# Patient Record
Sex: FEMALE | Race: WHITE | NOT HISPANIC OR LATINO | Employment: UNEMPLOYED | URBAN - METROPOLITAN AREA
[De-identification: names, ages, dates, MRNs, and addresses within clinical notes are randomized per-mention and may not be internally consistent; named-entity substitution may affect disease eponyms.]

---

## 2018-11-18 ENCOUNTER — OFFICE VISIT (OUTPATIENT)
Dept: URGENT CARE | Facility: MEDICAL CENTER | Age: 23
End: 2018-11-18
Payer: COMMERCIAL

## 2018-11-18 VITALS
DIASTOLIC BLOOD PRESSURE: 78 MMHG | WEIGHT: 10.28 LBS | BODY MASS INDEX: 1.94 KG/M2 | OXYGEN SATURATION: 100 % | HEART RATE: 90 BPM | RESPIRATION RATE: 18 BRPM | TEMPERATURE: 98.2 F | HEIGHT: 61 IN | SYSTOLIC BLOOD PRESSURE: 110 MMHG

## 2018-11-18 DIAGNOSIS — R30.0 DYSURIA: Primary | ICD-10-CM

## 2018-11-18 LAB
SL AMB  POCT GLUCOSE, UA: ABNORMAL
SL AMB LEUKOCYTE ESTERASE,UA: ABNORMAL
SL AMB POCT BILIRUBIN,UA: ABNORMAL
SL AMB POCT BLOOD,UA: ABNORMAL
SL AMB POCT CLARITY,UA: ABNORMAL
SL AMB POCT COLOR,UA: ABNORMAL
SL AMB POCT KETONES,UA: ABNORMAL
SL AMB POCT NITRITE,UA: ABNORMAL
SL AMB POCT PH,UA: 6
SL AMB POCT SPECIFIC GRAVITY,UA: 1
SL AMB POCT URINE PROTEIN: ABNORMAL
SL AMB POCT UROBILINOGEN: 0.2

## 2018-11-18 PROCEDURE — 99213 OFFICE O/P EST LOW 20 MIN: CPT | Performed by: PHYSICIAN ASSISTANT

## 2018-11-18 PROCEDURE — 87086 URINE CULTURE/COLONY COUNT: CPT | Performed by: PHYSICIAN ASSISTANT

## 2018-11-18 RX ORDER — NORGESTIMATE AND ETHINYL ESTRADIOL 7DAYSX3 LO
1 KIT ORAL DAILY
COMMUNITY
End: 2020-08-22 | Stop reason: ALTCHOICE

## 2018-11-18 RX ORDER — SULFAMETHOXAZOLE AND TRIMETHOPRIM 800; 160 MG/1; MG/1
1 TABLET ORAL EVERY 12 HOURS SCHEDULED
Qty: 14 TABLET | Refills: 0 | Status: SHIPPED | OUTPATIENT
Start: 2018-11-18 | End: 2018-11-25

## 2018-11-18 NOTE — PATIENT INSTRUCTIONS
1  Increase fluids  2  Take Bactrim 1 tablet twice daily x 7 days  3   Follow up with PCP in 3-5 days if symptoms persist

## 2018-11-18 NOTE — PROGRESS NOTES
[facility]        NAME: Geoff Dietz is a 26 y o  female  : 1995    MRN: [id number]  DATE: 2018  TIME: 4:41 PM    Assessment and Plan   Dysuria [R30 0]  1  Dysuria  POCT urine dip    Urine culture    sulfamethoxazole-trimethoprim (BACTRIM DS) 800-160 mg per tablet         Patient Instructions     1  Increase fluids  2  Take Bactrim 1 tablet twice daily x 7 days  3  Follow up with PCP in 3-5 days if symptoms persist      Chief Complaint     Chief Complaint   Patient presents with    Possible UTI         History of Present Illness       The patient is a 67-year-old female presents with a 1 day history of acute onset pain and burning with urination  She denies any abdominal or back pain but has had visible blood in her urine  Review of Systems   Review of Systems   Constitutional: Negative  Gastrointestinal: Negative  Genitourinary: Positive for dysuria, frequency and hematuria  Current Medications       Current Outpatient Prescriptions:     norgestimate-ethinyl estradiol (ORTHO TRI-CYCLEN LO) 0 18/0 215/0 25 MG-25 MCG per tablet, Take 1 tablet by mouth daily, Disp: , Rfl:     sulfamethoxazole-trimethoprim (BACTRIM DS) 800-160 mg per tablet, Take 1 tablet by mouth every 12 (twelve) hours for 7 days, Disp: 14 tablet, Rfl: 0    Current Allergies     Allergies as of 2018 - Reviewed 2018   Allergen Reaction Noted    Azithromycin Rash 2018            The following portions of the patient's history were reviewed and updated as appropriate: allergies, current medications, past family history, past medical history, past social history, past surgical history and problem list      Past Medical History:   Diagnosis Date    Known health problems: none        No past surgical history on file  No family history on file  Medications have been verified          Objective   /78   Pulse 90   Temp 98 2 °F (36 8 °C) (Temporal)   Resp 18   Ht 5' 1" (1 549 m)   Wt 4 663 kg (10 lb 4 5 oz)   SpO2 100%   BMI 1 94 kg/m²        Physical Exam     Physical Exam   Constitutional: She appears well-developed and well-nourished  No distress  Cardiovascular: Normal rate, regular rhythm and normal heart sounds  No murmur heard  Pulmonary/Chest: Effort normal and breath sounds normal    Abdominal: Soft  Bowel sounds are normal  There is tenderness in the suprapubic area  There is no rigidity, no rebound, no guarding and no CVA tenderness

## 2018-11-19 LAB — BACTERIA UR CULT: NORMAL

## 2019-01-13 ENCOUNTER — HOSPITAL ENCOUNTER (EMERGENCY)
Facility: HOSPITAL | Age: 24
Discharge: HOME | End: 2019-01-14
Attending: EMERGENCY MEDICINE | Admitting: EMERGENCY MEDICINE
Payer: COMMERCIAL

## 2019-01-13 ENCOUNTER — APPOINTMENT (EMERGENCY)
Dept: RADIOLOGY | Facility: HOSPITAL | Age: 24
End: 2019-01-13
Payer: COMMERCIAL

## 2019-01-13 VITALS
HEIGHT: 61 IN | TEMPERATURE: 97.9 F | HEART RATE: 73 BPM | OXYGEN SATURATION: 100 % | RESPIRATION RATE: 18 BRPM | SYSTOLIC BLOOD PRESSURE: 113 MMHG | BODY MASS INDEX: 18.88 KG/M2 | DIASTOLIC BLOOD PRESSURE: 72 MMHG | WEIGHT: 100 LBS

## 2019-01-13 DIAGNOSIS — E87.6 HYPOKALEMIA: ICD-10-CM

## 2019-01-13 DIAGNOSIS — R07.9 CHEST PAIN: Primary | ICD-10-CM

## 2019-01-13 DIAGNOSIS — F41.9 ANXIETY: ICD-10-CM

## 2019-01-13 LAB
ANION GAP SERPL CALCULATED.3IONS-SCNC: 14 MMOL/L (ref 4–13)
ATRIAL RATE: 82 BPM
BACTERIA UR QL AUTO: ABNORMAL /HPF
BASOPHILS # BLD AUTO: 0.02 THOUSANDS/ΜL (ref 0–0.1)
BASOPHILS NFR BLD AUTO: 0 % (ref 0–1)
BILIRUB UR QL STRIP: NEGATIVE
BUN SERPL-MCNC: 14 MG/DL (ref 5–25)
CALCIUM SERPL-MCNC: 8.9 MG/DL (ref 8.3–10.1)
CHLORIDE SERPL-SCNC: 101 MMOL/L (ref 100–108)
CLARITY UR: CLEAR
CO2 SERPL-SCNC: 24 MMOL/L (ref 21–32)
COLOR UR: YELLOW
CREAT SERPL-MCNC: 0.87 MG/DL (ref 0.6–1.3)
DEPRECATED D DIMER PPP: 345 NG/ML (FEU)
EOSINOPHIL # BLD AUTO: 0.04 THOUSAND/ΜL (ref 0–0.61)
EOSINOPHIL NFR BLD AUTO: 0 % (ref 0–6)
ERYTHROCYTE [DISTWIDTH] IN BLOOD BY AUTOMATED COUNT: 12.6 % (ref 11.6–15.1)
GFR SERPL CREATININE-BSD FRML MDRD: 95 ML/MIN/1.73SQ M
GLUCOSE SERPL-MCNC: 160 MG/DL (ref 65–140)
GLUCOSE UR STRIP-MCNC: NEGATIVE MG/DL
HCT VFR BLD AUTO: 42.3 % (ref 34.8–46.1)
HGB BLD-MCNC: 14.5 G/DL (ref 11.5–15.4)
HGB UR QL STRIP.AUTO: ABNORMAL
IMM GRANULOCYTES # BLD AUTO: 0.02 THOUSAND/UL (ref 0–0.2)
IMM GRANULOCYTES NFR BLD AUTO: 0 % (ref 0–2)
KETONES UR STRIP-MCNC: ABNORMAL MG/DL
LEUKOCYTE ESTERASE UR QL STRIP: NEGATIVE
LYMPHOCYTES # BLD AUTO: 3.07 THOUSANDS/ΜL (ref 0.6–4.47)
LYMPHOCYTES NFR BLD AUTO: 34 % (ref 14–44)
MCH RBC QN AUTO: 32.7 PG (ref 26.8–34.3)
MCHC RBC AUTO-ENTMCNC: 34.3 G/DL (ref 31.4–37.4)
MCV RBC AUTO: 96 FL (ref 82–98)
MONOCYTES # BLD AUTO: 0.44 THOUSAND/ΜL (ref 0.17–1.22)
MONOCYTES NFR BLD AUTO: 5 % (ref 4–12)
NEUTROPHILS # BLD AUTO: 5.38 THOUSANDS/ΜL (ref 1.85–7.62)
NEUTS SEG NFR BLD AUTO: 61 % (ref 43–75)
NITRITE UR QL STRIP: NEGATIVE
NON-SQ EPI CELLS URNS QL MICRO: ABNORMAL /HPF
NRBC BLD AUTO-RTO: 0 /100 WBCS
P AXIS: 66 DEGREES
PH UR STRIP.AUTO: 8 [PH] (ref 4.5–8)
PLATELET # BLD AUTO: 251 THOUSANDS/UL (ref 149–390)
PMV BLD AUTO: 9.9 FL (ref 8.9–12.7)
POTASSIUM SERPL-SCNC: 2.7 MMOL/L (ref 3.5–5.3)
PR INTERVAL: 116 MS
PROT UR STRIP-MCNC: NEGATIVE MG/DL
QRS AXIS: 85 DEGREES
QRSD INTERVAL: 82 MS
QT INTERVAL: 398 MS
QTC INTERVAL: 464 MS
RBC # BLD AUTO: 4.43 MILLION/UL (ref 3.81–5.12)
RBC #/AREA URNS AUTO: ABNORMAL /HPF
SODIUM SERPL-SCNC: 139 MMOL/L (ref 136–145)
SP GR UR STRIP.AUTO: 1.01 (ref 1–1.03)
T WAVE AXIS: 8 DEGREES
TROPONIN I SERPL-MCNC: <0.02 NG/ML
UROBILINOGEN UR QL STRIP.AUTO: 0.2 E.U./DL
VENTRICULAR RATE: 82 BPM
WBC # BLD AUTO: 8.97 THOUSAND/UL (ref 4.31–10.16)
WBC #/AREA URNS AUTO: ABNORMAL /HPF

## 2019-01-13 PROCEDURE — 93005 ELECTROCARDIOGRAM TRACING: CPT

## 2019-01-13 PROCEDURE — 99285 EMERGENCY DEPT VISIT HI MDM: CPT

## 2019-01-13 PROCEDURE — 71046 X-RAY EXAM CHEST 2 VIEWS: CPT

## 2019-01-13 PROCEDURE — 96366 THER/PROPH/DIAG IV INF ADDON: CPT

## 2019-01-13 PROCEDURE — 81001 URINALYSIS AUTO W/SCOPE: CPT | Performed by: PHYSICIAN ASSISTANT

## 2019-01-13 PROCEDURE — 36415 COLL VENOUS BLD VENIPUNCTURE: CPT | Performed by: PHYSICIAN ASSISTANT

## 2019-01-13 PROCEDURE — 96365 THER/PROPH/DIAG IV INF INIT: CPT

## 2019-01-13 PROCEDURE — 96361 HYDRATE IV INFUSION ADD-ON: CPT

## 2019-01-13 PROCEDURE — 85025 COMPLETE CBC W/AUTO DIFF WBC: CPT | Performed by: PHYSICIAN ASSISTANT

## 2019-01-13 PROCEDURE — 85379 FIBRIN DEGRADATION QUANT: CPT | Performed by: PHYSICIAN ASSISTANT

## 2019-01-13 PROCEDURE — 93010 ELECTROCARDIOGRAM REPORT: CPT | Performed by: INTERNAL MEDICINE

## 2019-01-13 PROCEDURE — 80048 BASIC METABOLIC PNL TOTAL CA: CPT | Performed by: PHYSICIAN ASSISTANT

## 2019-01-13 PROCEDURE — 84484 ASSAY OF TROPONIN QUANT: CPT | Performed by: PHYSICIAN ASSISTANT

## 2019-01-13 RX ORDER — CLONAZEPAM 0.5 MG/1
0.5 TABLET ORAL 2 TIMES DAILY PRN
Qty: 9 TABLET | Refills: 0 | Status: SHIPPED | OUTPATIENT
Start: 2019-01-13 | End: 2020-08-12 | Stop reason: ALTCHOICE

## 2019-01-13 RX ORDER — POTASSIUM CHLORIDE 20 MEQ/1
40 TABLET, EXTENDED RELEASE ORAL ONCE
Status: COMPLETED | OUTPATIENT
Start: 2019-01-13 | End: 2019-01-13

## 2019-01-13 RX ORDER — POTASSIUM CHLORIDE 14.9 MG/ML
20 INJECTION INTRAVENOUS ONCE
Status: COMPLETED | OUTPATIENT
Start: 2019-01-13 | End: 2019-01-14

## 2019-01-13 RX ADMIN — POTASSIUM CHLORIDE 20 MEQ: 200 INJECTION, SOLUTION INTRAVENOUS at 22:14

## 2019-01-13 RX ADMIN — POTASSIUM CHLORIDE 40 MEQ: 1500 TABLET, EXTENDED RELEASE ORAL at 22:11

## 2019-01-13 RX ADMIN — SODIUM CHLORIDE 1000 ML: 0.9 INJECTION, SOLUTION INTRAVENOUS at 21:43

## 2019-01-14 NOTE — ED PROVIDER NOTES
History  Chief Complaint   Patient presents with    Chest Pain     Pt co chest pain that started 2 hours ago  Pt reports bilateral arm numbness and tingling and blurred vsion  This is a 81-year-old female patient here for evaluation of chest pain  Describes as a cramping sensation  Started about an hour to prior to arrival   Initially started to subside but when it came back she began to get a little nervous  She then began having left shoulder pain and bilateral arm tingling  She felt like her vision was blurred  By the time she had gotten to the ER she is now feeling better  She suspects this could have been a panic attack but wanted to be safe  She states she now feels silly for coming to the ER  Denies any recent travels traumas or surgeries  No leg swelling but did have leg tingling earlier as well  History provided by:  Patient   used: No    Chest Pain   Pain location:  Substernal area  Pain quality comment:  Cramping  Pain radiates to:  Does not radiate  Pain radiates to the back: no    Pain severity:  Mild  Onset quality:  Sudden  Duration:  2 hours  Timing:  Intermittent  Progression:  Improving  Chronicity:  New  Context: at rest    Context: not breathing, no drug use, not eating, no intercourse, not lifting, no movement, not raising an arm, no stress and no trauma    Associated symptoms: shortness of breath    Associated symptoms: no abdominal pain, no cough, no diaphoresis, no dizziness, no dysphagia, no fatigue, no fever, no headache, no nausea, no numbness, no palpitations, not vomiting and no weakness        Prior to Admission Medications   Prescriptions Last Dose Informant Patient Reported?  Taking?   norgestimate-ethinyl estradiol (ORTHO TRI-CYCLEN LO) 0 18/0 215/0 25 MG-25 MCG per tablet   Yes No   Sig: Take 1 tablet by mouth daily      Facility-Administered Medications: None       Past Medical History:   Diagnosis Date    Known health problems: none     Mitral valve prolapse     Psychiatric disorder     anxiety, depression    Raynaud's disease        History reviewed  No pertinent surgical history  History reviewed  No pertinent family history  I have reviewed and agree with the history as documented  Social History   Substance Use Topics    Smoking status: Never Smoker    Smokeless tobacco: Never Used    Alcohol use Yes      Comment: "socially"        Review of Systems   Constitutional: Negative for activity change, appetite change, chills, diaphoresis, fatigue, fever and unexpected weight change  HENT: Negative for congestion, rhinorrhea, sinus pressure, sore throat and trouble swallowing  Eyes: Negative for photophobia and visual disturbance  Respiratory: Positive for shortness of breath  Negative for apnea, cough, choking, chest tightness, wheezing and stridor  Cardiovascular: Positive for chest pain  Negative for palpitations and leg swelling  Gastrointestinal: Negative for abdominal distention, abdominal pain, blood in stool, constipation, diarrhea, nausea and vomiting  Genitourinary: Negative for decreased urine volume, difficulty urinating, dysuria, enuresis, flank pain, frequency, hematuria and urgency  Musculoskeletal: Negative for arthralgias, myalgias, neck pain and neck stiffness  Skin: Negative for color change, pallor, rash and wound  Allergic/Immunologic: Negative  Neurological: Negative for dizziness, tremors, syncope, weakness, light-headedness, numbness and headaches  Tingling bilateral upper extremities  Hematological: Negative  Psychiatric/Behavioral: Negative  All other systems reviewed and are negative  Physical Exam  Physical Exam   Constitutional: She is oriented to person, place, and time  She appears well-developed and well-nourished  Non-toxic appearance  She does not have a sickly appearance  She does not appear ill  No distress  HENT:   Head: Normocephalic and atraumatic  Eyes: Pupils are equal, round, and reactive to light  EOM and lids are normal    Neck: Normal range of motion  Neck supple  Cardiovascular: Normal rate, regular rhythm, S1 normal, S2 normal, normal heart sounds, intact distal pulses and normal pulses  Exam reveals no gallop, no distant heart sounds, no friction rub and no decreased pulses  No murmur heard  Pulses:       Radial pulses are 2+ on the right side, and 2+ on the left side  Pulmonary/Chest: Effort normal and breath sounds normal  No accessory muscle usage  No apnea, no tachypnea and no bradypnea  No respiratory distress  She has no decreased breath sounds  She has no wheezes  She has no rhonchi  She has no rales  Chest wall is not dull to percussion  She exhibits tenderness and bony tenderness  She exhibits no mass, no laceration, no crepitus, no edema, no deformity, no swelling and no retraction  Chest wall tenderness substernal region  Abdominal: Soft  Normal appearance  She exhibits no distension  There is no tenderness  There is no rigidity, no rebound and no guarding  Musculoskeletal: Normal range of motion  She exhibits no edema, tenderness or deformity  Neurological: She is alert and oriented to person, place, and time  No cranial nerve deficit  GCS eye subscore is 4  GCS verbal subscore is 5  GCS motor subscore is 6  GCS 15  AAOx3  Ambulating in department without difficulty  CN II-XII grossly intact  No focal neuro deficits  Normal sensation of bilateral upper extremities  Skin: Skin is warm, dry and intact  No rash noted  She is not diaphoretic  No erythema  No pallor  Psychiatric: Her speech is normal    Nursing note and vitals reviewed        Vital Signs  ED Triage Vitals   Temperature Pulse Respirations Blood Pressure SpO2   01/13/19 2118 01/13/19 2112 01/13/19 2112 01/13/19 2112 01/13/19 2112   97 9 °F (36 6 °C) 75 22 134/76 100 %      Temp Source Heart Rate Source Patient Position - Orthostatic VS BP Location FiO2 (%)   01/13/19 2112 01/13/19 2112 01/13/19 2112 01/13/19 2112 --   Oral Monitor Sitting Left arm       Pain Score       01/13/19 2112       1           Vitals:    01/13/19 2112 01/13/19 2216   BP: 134/76 113/72   Pulse: 75 73   Patient Position - Orthostatic VS: Sitting Lying       Visual Acuity      ED Medications  Medications   potassium chloride 20 mEq IVPB (premix) (20 mEq Intravenous New Bag 1/13/19 2214)   sodium chloride 0 9 % bolus 1,000 mL (0 mL Intravenous Stopped 1/13/19 2257)   potassium chloride (K-DUR,KLOR-CON) CR tablet 40 mEq (40 mEq Oral Given 1/13/19 2211)       Diagnostic Studies  Results Reviewed     Procedure Component Value Units Date/Time    Urine Microscopic [[id number]]  (Abnormal) Collected:  01/13/19 2143    Lab Status:  Final result Specimen:  Urine from Urine, Clean Catch Updated:  01/13/19 2202     RBC, UA 0-1 (A) /hpf      WBC, UA 0-1 (A) /hpf      Epithelial Cells Occasional /hpf      Bacteria, UA Occasional /hpf     Troponin I [[id number]]  (Normal) Collected:  01/13/19 2136    Lab Status:  Final result Specimen:  Blood from Arm, Right Updated:  01/13/19 2200     Troponin I <0 02 ng/mL     Basic metabolic panel [[id number]]  (Abnormal) Collected:  01/13/19 2136    Lab Status:  Final result Specimen:  Blood from Arm, Right Updated:  01/13/19 2157     Sodium 139 mmol/L      Potassium 2 7 (LL) mmol/L      Chloride 101 mmol/L      CO2 24 mmol/L      ANION GAP 14 (H) mmol/L      BUN 14 mg/dL      Creatinine 0 87 mg/dL      Glucose 160 (H) mg/dL      Calcium 8 9 mg/dL      eGFR 95 ml/min/1 73sq m     Narrative:         National Kidney Disease Education Program recommendations are as follows:  GFR calculation is accurate only with a steady state creatinine  Chronic Kidney disease less than 60 ml/min/1 73 sq  meters  Kidney failure less than 15 ml/min/1 73 sq  meters      D-dimer, quantitative [[id number]]  (Normal) Collected:  01/13/19 2136    Lab Status:  Final result Specimen:  Blood from Arm, Right Updated:  01/13/19 2155     D-Dimer, Quant 345 ng/ml (FEU)     UA w Reflex to Microscopic w Reflex to Culture [[id number]]  (Abnormal) Collected:  01/13/19 2143    Lab Status:  Final result Specimen:  Urine from Urine, Clean Catch Updated:  01/13/19 2154     Color, UA Yellow     Clarity, UA Clear     Specific Gravity, UA 1 015     pH, UA 8 0     Leukocytes, UA Negative     Nitrite, UA Negative     Protein, UA Negative mg/dl      Glucose, UA Negative mg/dl      Ketones, UA 15 (1+) (A) mg/dl      Urobilinogen, UA 0 2 E U /dl      Bilirubin, UA Negative     Blood, UA Trace-Intact (A)    CBC and differential [[id number]] Collected:  01/13/19 2136    Lab Status:  Final result Specimen:  Blood from Arm, Right Updated:  01/13/19 2143     WBC 8 97 Thousand/uL      RBC 4 43 Million/uL      Hemoglobin 14 5 g/dL      Hematocrit 42 3 %      MCV 96 fL      MCH 32 7 pg      MCHC 34 3 g/dL      RDW 12 6 %      MPV 9 9 fL      Platelets 989 Thousands/uL      nRBC 0 /100 WBCs      Neutrophils Relative 61 %      Immat GRANS % 0 %      Lymphocytes Relative 34 %      Monocytes Relative 5 %      Eosinophils Relative 0 %      Basophils Relative 0 %      Neutrophils Absolute 5 38 Thousands/µL      Immature Grans Absolute 0 02 Thousand/uL      Lymphocytes Absolute 3 07 Thousands/µL      Monocytes Absolute 0 44 Thousand/µL      Eosinophils Absolute 0 04 Thousand/µL      Basophils Absolute 0 02 Thousands/µL                  XR chest 2 views   ED Interpretation by Ivon Mckeon PA-C (01/13 2208)   No acute cardiopulmonary abnormalities                   Procedures  ECG 12 Lead Documentation  Date/Time: 1/13/2019 9:39 PM  Performed by: Reji Albert by: Charmayne Forte     Indications / Diagnosis:  82  ECG reviewed by me, the ED Provider: yes    Patient location:  ED  Previous ECG:     Previous ECG:  Unavailable    Comparison to cardiac monitor: Yes    Interpretation:     Interpretation: normal    Quality:     Tracing quality:  Limited by artifact  Rate:     ECG rate:  82    ECG rate assessment: normal    Rhythm:     Rhythm: sinus rhythm    Ectopy:     Ectopy: none    QRS:     QRS axis:  Normal    QRS intervals:  Normal  Conduction:     Conduction: normal    ST segments:     ST segments:  Normal  T waves:     T waves: normal             Phone Contacts  ED Phone Contact    ED Course  ED Course as of Jan 13 2309   Sun Jan 13, 2019 2156 D-DIMER QUANTITATIVE: 223   2162 Feels better  Resting comfortably  HEART Risk Score      Most Recent Value   History  0 Filed at: 01/13/2019 2218   ECG  0 Filed at: 01/13/2019 2218   Age  0 Filed at: 01/13/2019 2218   Risk Factors  0 Filed at: 01/13/2019 2218   Troponin  0 Filed at: 01/13/2019 2218   Heart Score Risk Calculator   History  0 Filed at: 01/13/2019 2218   ECG  0 Filed at: 01/13/2019 2218   Age  0 Filed at: 01/13/2019 2218   Risk Factors  0 Filed at: 01/13/2019 2218   Troponin  0 Filed at: 01/13/2019 2218   HEART Score  0 Filed at: 01/13/2019 2218   HEART Score  0 Filed at: 01/13/2019 2218                            MDM  Number of Diagnoses or Management Options  Anxiety: new and requires workup  Chest pain: new and requires workup  Hypokalemia: new and requires workup  Diagnosis management comments: DDX including but not limited to: chest wall pain, pleurisy, costochondritis, pericarditis, myocarditis, PTX, pneumonia, GI etiology; doubt ACS or MI or dissection or PE or rhabdomyolysis  Plan: Cardiac workup  EKG  DDimer given exogenous estrogen  Amount and/or Complexity of Data Reviewed  Clinical lab tests: ordered and reviewed  Tests in the radiology section of CPT®: ordered and reviewed  Independent visualization of images, tracings, or specimens: yes    Risk of Complications, Morbidity, and/or Mortality  Presenting problems: moderate  Management options: low  General comments: 21-year-old female with chest pain  Resolved after resting here    Chest x-ray shows no acute pulmonary abnormalities  Troponin negative  D-dimer negative  Pain is reproducible on exam   Suspect chest wall pain that led to panic attack/anxiety  Patient agrees with plan  States she felt very anxious and that is what led to the progression of her symptoms  She was found to have hypokalemia, potassium of 2 7  She was given repletion  She notes history of hypokalemia, states her mother told her that she always has low potassium  Her symptoms have completely resolved  I feel comfortable discharging the patient home for outpatient follow-up  She has a psychiatrist she will see for her anxiety  Will give a short prescription for Klonopin which was previously on  Return parameters provided  Pt understands and agrees with plan  Patient Progress  Patient progress: stable    CritCare Time    Disposition  Final diagnoses:   Chest pain   Hypokalemia   Anxiety     Time reflects when diagnosis was documented in both MDM as applicable and the Disposition within this note     Time User Action Codes Description Comment    1/13/2019 10:58 PM Marty Rickey L Add [R07 9] Chest pain     1/13/2019 10:58 PM Marty Rickey L Add [E87 6] Hypokalemia     1/13/2019 10:58 PM Nunu Come Add [F41 9] Anxiety       ED Disposition     ED Disposition Condition Comment    Discharge  [place]  discharge to home/self care  Condition at discharge: Good        Follow-up Information     Follow up With Specialties Details Why Contact Info    Your family doctor        Your psyhciatrist              Patient's Medications   Discharge Prescriptions    CLONAZEPAM (KLONOPIN) 0 5 MG TABLET    Take 1 tablet (0 5 mg total) by mouth 2 (two) times a day as needed for anxiety       Start Date: 1/13/2019 End Date: --       Order Dose: 0 5 mg       Quantity: 9 tablet    Refills: 0     No discharge procedures on file      ED Provider  Electronically Signed by           Preet Lux PA-C  01/13/19 0366

## 2019-01-14 NOTE — DISCHARGE INSTRUCTIONS
Return to the Emergency Department sooner if increased pain, fever, vomiting, difficulty breathing, rash  Hypokalemia   AMBULATORY CARE:   Hypokalemia  is a low level of potassium in your blood  Potassium helps control how your muscles, heart, and digestive system work  Hypokalemia occurs when your body loses too much potassium or does not absorb enough from food  Common signs and symptoms include the following: You may not have any signs or symptoms if you have mild hypokalemia  You may have any of the following if it is more severe:  · Fatigue    · Constipation    · Frequent urination or urinating large amounts    · Muscle cramps or skin tingling    · Muscle weakness    · Fast or irregular heartbeat  Seek care immediately if:   · You cannot move your arm or leg  · You have a fast or irregular heartbeat  · You are too tired or weak to stand up  Contact your healthcare provider if:   · You are vomiting, or you have diarrhea  · You have numbness or tingling in your arms or legs  · Your symptoms do not go away or they get worse  · You have questions or concerns about your condition or care  Treatment:  You will receive potassium to bring your levels back to normal  This may be given as a pill or IV  The amount of potassium you will be given depends on your potassium level  Eat foods that are high in potassium:  Foods that are high in potassium include bananas, oranges, tomatoes, potatoes, and avocado  [place] beans, turkey, salmon, lean beef, yogurt, and milk are also high in potassium  Ask your healthcare provider or dietitian for more information about foods that are high in potassium  Follow up with your healthcare provider as directed:  Write down your questions so you remember to ask them during your visits  © 2017 [employer] Information is for End User's use only and may not be sold, redistributed or otherwise used for commercial purposes   All illustrations and images included in [facility] are the copyrighted property of [employer]  or Emiliano Rea  The above information is an  only  It is not intended as medical advice for individual conditions or treatments  Talk to your doctor, nurse or pharmacist before following any medical regimen to see if it is safe and effective for you  Chest Pain   WHAT YOU NEED TO KNOW:   Chest pain can be caused by a range of conditions, from not serious to life-threatening  Chest pain can be a symptom of a digestive problem, such as acid reflux or a stomach ulcer  An anxiety attack or a strong emotion, such as anger, can also cause chest pain  Infection, inflammation, or a fracture in the bones or cartilage in your chest can cause pain or discomfort  Sometimes chest pain or pressure is caused by poor blood flow to your heart (angina)  Chest pain may also be caused by life-threatening conditions such as a heart attack or blood clot in your lungs  DISCHARGE INSTRUCTIONS:   Call 911 if:   · You have any of the following signs of a heart attack:      ¨ Squeezing, pressure, or pain in your chest that lasts longer than 5 minutes or returns    ¨ Discomfort or pain in your back, neck, jaw, stomach, or arm     ¨ Trouble breathing    ¨ Nausea or vomiting    ¨ Lightheadedness or a sudden cold sweat, especially with chest pain or trouble breathing    Seek care immediately if:   · You have chest discomfort that gets worse, even with medicine  · You cough or vomit blood  · Your bowel movements are black or bloody  · You cannot stop vomiting, or it hurts to swallow  Contact your healthcare provider if:   · You have questions or concerns about your condition or care  Medicines:   · Medicines  may be given to treat the cause of your chest pain  Examples include pain medicine, anxiety medicine, or medicines to increase blood flow to your heart       · Do not take certain medicines without asking your healthcare provider first   These include NSAIDs, herbal or vitamin supplements, or hormones (estrogen or progestin)  · Take your medicine as directed  Contact your healthcare provider if you think your medicine is not helping or if you have side effects  Tell him or her if you are allergic to any medicine  Keep a list of the medicines, vitamins, and herbs you take  Include the amounts, and when and why you take them  Bring the list or the pill bottles to follow-up visits  Carry your medicine list with you in case of an emergency  Follow up with your healthcare provider within 72 hours, or as directed: You may need to return for more tests to find the cause of your chest pain  You may be referred to a specialist, such as a cardiologist or gastroenterologist  Write down your questions so you remember to ask them during your visits  Healthy living tips: The following are general healthy guidelines  If your chest pain is caused by a heart problem, your healthcare provider will give you specific guidelines to follow  · Do not smoke  Nicotine and other chemicals in cigarettes and cigars can cause lung and heart damage  Ask your healthcare provider for information if you currently smoke and need help to quit  E-cigarettes or smokeless tobacco still contain nicotine  Talk to your healthcare provider before you use these products  · Eat a variety of healthy, low-fat foods  Healthy foods include fruits, vegetables, whole-grain breads, low-fat dairy products, beans, lean meats, and fish  Ask for more information about a heart healthy diet  · Ask about activity  Your healthcare provider will tell you which activities to limit or avoid  Ask when you can drive, return to work, and have sex  Ask about the best exercise plan for you  · Maintain a healthy weight  Ask your healthcare provider how much you should weigh  Ask him or her to help you create a weight loss plan if you are overweight    © 2017 [facility] [place] [address] [facility] is for End User's use only and may not be sold, redistributed or otherwise used for commercial purposes  All illustrations and images included in CareNotes® are the copyrighted property of [employer]  or Emiliano Rea  The above information is an  only  It is not intended as medical advice for individual conditions or treatments  Talk to your doctor, nurse or pharmacist before following any medical regimen to see if it is safe and effective for you  Anxiety   WHAT YOU NEED TO KNOW:   Anxiety is a condition that causes you to feel extremely worried or nervous  The feelings are so strong that they can cause problems with your daily activities or sleep  Anxiety may be triggered by something you fear, or it may happen without a cause  Family or work stress, smoking, caffeine, and alcohol can increase your risk for anxiety  Certain medicines or health conditions can also increase your risk  Anxiety can become a long-term condition if it is not managed or treated  DISCHARGE INSTRUCTIONS:   Call 911 if:   · You have chest pain, tightness, or heaviness that may spread to your shoulders, arms, jaw, neck, or back  · You feel like hurting yourself or someone else  Contact your healthcare provider if:   · Your symptoms get worse or do not get better with treatment  · Your anxiety keeps you from doing your regular daily activities  · You have new symptoms since your last visit  · You have questions or concerns about your condition or care  Medicines:   · Medicines  may be given to help you feel more calm and relaxed, and decrease your symptoms  · Take your medicine as directed  Contact your healthcare provider if you think your medicine is not helping or if you have side effects  Tell him of her if you are allergic to any medicine  Keep a list of the medicines, vitamins, and herbs you take  Include the amounts, and when and why you take them   Bring the list or the pill bottles to follow-up visits  Carry your medicine list with you in case of an emergency  Follow up with your healthcare provider within 2 weeks or as directed:  Write down your questions so you remember to ask them during your visits  Manage anxiety:   · Talk to someone about your anxiety  Your healthcare provider may suggest counseling  Cognitive behavioral therapy can help you understand and change how you react to events that trigger your symptoms  You might feel more comfortable talking with a friend or family member about your anxiety  Choose someone you know will be supportive and encouraging  · Find ways to relax  Activities such as exercise, meditation, or listening to music can help you relax  Spend time with friends, or do things you enjoy  · Practice deep breathing  Deep breathing can help you relax when you feel anxious  Focus on taking slow, deep breaths several times a day, or during an anxiety attack  Breathe in through your nose and out through your mouth  · Create a regular sleep routine  Regular sleep can help you feel calmer during the day  Go to sleep and wake up at the same times every day  Do not watch television or use the computer right before bed  Your room should be comfortable, dark, and quiet  · Eat a variety of healthy foods  Healthy foods include fruits, vegetables, low-fat dairy products, lean meats, fish, whole-grain breads, and cooked beans  Healthy foods can help you feel less anxious and have more energy  · Exercise regularly  Exercise can increase your energy level  Exercise may also lift your mood and help you sleep better  Your healthcare provider can help you create an exercise plan  · Do not smoke  Nicotine and other chemicals in cigarettes and cigars can increase anxiety  Ask your healthcare provider for information if you currently smoke and need help to quit  E-cigarettes or smokeless tobacco still contain nicotine   Talk to your healthcare provider before you use these products  · Do not have caffeine  Caffeine can make your symptoms worse  Do not have foods or drinks that are meant to increase your energy level  · Limit or do not drink alcohol  Ask your healthcare provider if alcohol is safe for you  You may not be able to drink alcohol if you take certain anxiety or depression medicines  Limit alcohol to 1 drink per day if you are a woman  Limit alcohol to 2 drinks per day if you are a man  A drink of alcohol is 12 ounces of beer, 5 ounces of wine, or 1½ ounces of liquor  · Do not use drugs  Drugs can make your anxiety worse  It can also make anxiety hard to manage  Talk to your healthcare provider if you use drugs and want help to quit  © 2017 [facility] Information is for End User's use only and may not be sold, redistributed or otherwise used for commercial purposes  All illustrations and images included in CareNotes® are the copyrighted property of A [employer] M [employer]  or Emiliano Rea  The above information is an  only  It is not intended as medical advice for individual conditions or treatments  Talk to your doctor, nurse or pharmacist before following any medical regimen to see if it is safe and effective for you

## 2019-03-04 ENCOUNTER — HOSPITAL ENCOUNTER (EMERGENCY)
Facility: HOSPITAL | Age: 24
Discharge: HOME | End: 2019-03-05
Attending: EMERGENCY MEDICINE
Payer: COMMERCIAL

## 2019-03-04 VITALS
RESPIRATION RATE: 18 BRPM | SYSTOLIC BLOOD PRESSURE: 129 MMHG | OXYGEN SATURATION: 99 % | HEART RATE: 76 BPM | BODY MASS INDEX: 18.73 KG/M2 | TEMPERATURE: 97.8 F | DIASTOLIC BLOOD PRESSURE: 69 MMHG | HEIGHT: 61 IN | WEIGHT: 99.2 LBS

## 2019-03-04 DIAGNOSIS — F41.0 ANXIETY ATTACK: Primary | ICD-10-CM

## 2019-03-04 LAB
ANION GAP SERPL CALCULATED.3IONS-SCNC: 8 MMOL/L (ref 4–13)
BASOPHILS # BLD AUTO: 0.02 THOUSANDS/ΜL (ref 0–0.1)
BASOPHILS NFR BLD AUTO: 0 % (ref 0–1)
BUN SERPL-MCNC: 12 MG/DL (ref 5–25)
CALCIUM SERPL-MCNC: 9.3 MG/DL (ref 8.3–10.1)
CHLORIDE SERPL-SCNC: 104 MMOL/L (ref 100–108)
CO2 SERPL-SCNC: 28 MMOL/L (ref 21–32)
CREAT SERPL-MCNC: 0.68 MG/DL (ref 0.6–1.3)
EOSINOPHIL # BLD AUTO: 0.06 THOUSAND/ΜL (ref 0–0.61)
EOSINOPHIL NFR BLD AUTO: 1 % (ref 0–6)
ERYTHROCYTE [DISTWIDTH] IN BLOOD BY AUTOMATED COUNT: 12.1 % (ref 11.6–15.1)
GFR SERPL CREATININE-BSD FRML MDRD: 125 ML/MIN/1.73SQ M
GLUCOSE SERPL-MCNC: 109 MG/DL (ref 65–140)
HCT VFR BLD AUTO: 40.8 % (ref 34.8–46.1)
HGB BLD-MCNC: 14.2 G/DL (ref 11.5–15.4)
IMM GRANULOCYTES # BLD AUTO: 0.01 THOUSAND/UL (ref 0–0.2)
IMM GRANULOCYTES NFR BLD AUTO: 0 % (ref 0–2)
LYMPHOCYTES # BLD AUTO: 2.23 THOUSANDS/ΜL (ref 0.6–4.47)
LYMPHOCYTES NFR BLD AUTO: 33 % (ref 14–44)
MCH RBC QN AUTO: 32.9 PG (ref 26.8–34.3)
MCHC RBC AUTO-ENTMCNC: 34.8 G/DL (ref 31.4–37.4)
MCV RBC AUTO: 94 FL (ref 82–98)
MONOCYTES # BLD AUTO: 0.37 THOUSAND/ΜL (ref 0.17–1.22)
MONOCYTES NFR BLD AUTO: 6 % (ref 4–12)
NEUTROPHILS # BLD AUTO: 4.05 THOUSANDS/ΜL (ref 1.85–7.62)
NEUTS SEG NFR BLD AUTO: 60 % (ref 43–75)
NRBC BLD AUTO-RTO: 0 /100 WBCS
PLATELET # BLD AUTO: 247 THOUSANDS/UL (ref 149–390)
PMV BLD AUTO: 10 FL (ref 8.9–12.7)
POTASSIUM SERPL-SCNC: 3.5 MMOL/L (ref 3.5–5.3)
RBC # BLD AUTO: 4.32 MILLION/UL (ref 3.81–5.12)
SODIUM SERPL-SCNC: 140 MMOL/L (ref 136–145)
TSH SERPL DL<=0.05 MIU/L-ACNC: 1.26 UIU/ML (ref 0.36–3.74)
WBC # BLD AUTO: 6.74 THOUSAND/UL (ref 4.31–10.16)

## 2019-03-04 PROCEDURE — 85025 COMPLETE CBC W/AUTO DIFF WBC: CPT | Performed by: EMERGENCY MEDICINE

## 2019-03-04 PROCEDURE — 36415 COLL VENOUS BLD VENIPUNCTURE: CPT | Performed by: EMERGENCY MEDICINE

## 2019-03-04 PROCEDURE — 80048 BASIC METABOLIC PNL TOTAL CA: CPT | Performed by: EMERGENCY MEDICINE

## 2019-03-04 PROCEDURE — 93005 ELECTROCARDIOGRAM TRACING: CPT

## 2019-03-04 PROCEDURE — 99283 EMERGENCY DEPT VISIT LOW MDM: CPT

## 2019-03-04 PROCEDURE — 84443 ASSAY THYROID STIM HORMONE: CPT | Performed by: EMERGENCY MEDICINE

## 2019-03-05 LAB
ATRIAL RATE: 76 BPM
P AXIS: 50 DEGREES
PR INTERVAL: 90 MS
QRS AXIS: 89 DEGREES
QRSD INTERVAL: 80 MS
QT INTERVAL: 396 MS
QTC INTERVAL: 445 MS
T WAVE AXIS: 15 DEGREES
VENTRICULAR RATE: 76 BPM

## 2019-03-05 PROCEDURE — 93010 ELECTROCARDIOGRAM REPORT: CPT | Performed by: INTERNAL MEDICINE

## 2019-03-05 NOTE — ED PROVIDER NOTES
History  Chief Complaint   Patient presents with    Panic Attack     Patient states she has history of panic attacks but this time "it feels different"  Cannot explain exactly what feels different  Took 0 5mg of xanax around 2030  Patient currently has holter monitor on and sees cardiologist tomorrow     Recurrent panic attacks  Feels anxious  No clear aggravating or precipitating factors  Not alleviated w xanax  Seen here and elsewhere several times for same sxs, pt reports unclear etiology and "they never find anything wrong " she denies recent drug or etoh use  She denies fever  She does report intermittent chest discomfort which is sternal, nonradiating, nonpleuritic and which she states is "typical" for her anxiety sxs  She has a f/u appt tomorrow w cardiology for further w/u of these ongoing sxs in her home town of [place]  Aside from the anxiety sxs, she has no other specific concerns or sxs at this time  Prior to Admission Medications   Prescriptions Last Dose Informant Patient Reported? Taking? clonazePAM (KlonoPIN) 0 5 mg tablet   No No   Sig: Take 1 tablet (0 5 mg total) by mouth 2 (two) times a day as needed for anxiety   norgestimate-ethinyl estradiol (ORTHO TRI-CYCLEN LO) 0 18/0 215/0 25 MG-25 MCG per tablet   Yes No   Sig: Take 1 tablet by mouth daily      Facility-Administered Medications: None       Past Medical History:   Diagnosis Date    Known health problems: none     Mitral valve prolapse     Psychiatric disorder     anxiety, depression    Raynaud's disease        History reviewed  No pertinent surgical history  History reviewed  No pertinent family history  I have reviewed and agree with the history as documented      Social History     Tobacco Use    Smoking status: Never Smoker    Smokeless tobacco: Never Used   Substance Use Topics    Alcohol use: Yes     Comment: "socially"    Drug use: Not Currently     Types: Cocaine     Comment: hasn't used in a few months Review of Systems   Respiratory: Positive for chest tightness  Neurological: Negative for dizziness, tremors, seizures, syncope, facial asymmetry, speech difficulty, weakness, light-headedness, numbness and headaches  All other systems reviewed and are negative  Physical Exam  Physical Exam   Constitutional: She is oriented to person, place, and time  She appears well-developed and well-nourished  No distress  HENT:   Head: Normocephalic and atraumatic  Eyes: Pupils are equal, round, and reactive to light  Conjunctivae and EOM are normal    Neck: Normal range of motion  Neck supple  No JVD present  Cardiovascular: Normal rate, regular rhythm, normal heart sounds and intact distal pulses  Pulmonary/Chest: Effort normal and breath sounds normal  No stridor  Abdominal: Soft  She exhibits no distension  There is no tenderness  There is no rebound and no guarding  Musculoskeletal: Normal range of motion  She exhibits no edema, tenderness or deformity  Neurological: She is alert and oriented to person, place, and time  No cranial nerve deficit or sensory deficit  She exhibits normal muscle tone  Coordination normal    Skin: Skin is warm and dry  Capillary refill takes less than 2 seconds  No rash noted  She is not diaphoretic  No erythema  No pallor  Nursing note and vitals reviewed        Vital Signs  ED Triage Vitals [03/04/19 2207]   Temperature Pulse Respirations Blood Pressure SpO2   97 8 °F (36 6 °C) 76 18 129/69 99 %      Temp Source Heart Rate Source Patient Position - Orthostatic VS BP Location FiO2 (%)   Oral Monitor Sitting Left arm --      Pain Score       1           Vitals:    03/04/19 2207   BP: 129/69   Pulse: 76   Patient Position - Orthostatic VS: Sitting       Visual Acuity      ED Medications  Medications - No data to display    Diagnostic Studies  Results Reviewed     Procedure Component Value Units Date/Time    Basic metabolic panel [[id number]] Collected:  03/04/19 2243    Lab Status:  Final result Specimen:  Blood from Arm, Right Updated:  03/04/19 2317     Sodium 140 mmol/L      Potassium 3 5 mmol/L      Chloride 104 mmol/L      CO2 28 mmol/L      ANION GAP 8 mmol/L      BUN 12 mg/dL      Creatinine 0 68 mg/dL      Glucose 109 mg/dL      Calcium 9 3 mg/dL      eGFR 125 ml/min/1 73sq m     Narrative:       National Kidney Disease Education Program recommendations are as follows:  GFR calculation is accurate only with a steady state creatinine  Chronic Kidney disease less than 60 ml/min/1 73 sq  meters  Kidney failure less than 15 ml/min/1 73 sq  meters  TSH [[id number]]  (Normal) Collected:  03/04/19 2243    Lab Status:  Final result Specimen:  Blood from Arm, Right Updated:  03/04/19 2317     TSH 3RD GENERATON 1 265 uIU/mL     Narrative:       Patients undergoing fluorescein dye angiography may retain small amounts of fluorescein in the body for 48-72 hours post procedure  Samples containing fluorescein can produce falsely depressed TSH values  If the patient had this procedure,a specimen should be resubmitted post fluorescein clearance      CBC and differential [[id number]] Collected:  03/04/19 2243    Lab Status:  Final result Specimen:  Blood from Arm, Right Updated:  03/04/19 2252     WBC 6 74 Thousand/uL      RBC 4 32 Million/uL      Hemoglobin 14 2 g/dL      Hematocrit 40 8 %      MCV 94 fL      MCH 32 9 pg      MCHC 34 8 g/dL      RDW 12 1 %      MPV 10 0 fL      Platelets 419 Thousands/uL      nRBC 0 /100 WBCs      Neutrophils Relative 60 %      Immat GRANS % 0 %      Lymphocytes Relative 33 %      Monocytes Relative 6 %      Eosinophils Relative 1 %      Basophils Relative 0 %      Neutrophils Absolute 4 05 Thousands/µL      Immature Grans Absolute 0 01 Thousand/uL      Lymphocytes Absolute 2 23 Thousands/µL      Monocytes Absolute 0 37 Thousand/µL      Eosinophils Absolute 0 06 Thousand/µL      Basophils Absolute 0 02 Thousands/µL                  No orders to display              Procedures  ECG 12 Lead Documentation  Date/Time: 3/4/2019 10:39 PM  Performed by: Timi Aldana MD  Authorized by: Timi Aldana MD     Indications / Diagnosis:  Panic attack  ECG reviewed by me, the ED Provider: yes    Patient location:  ED  Previous ECG:     Previous ECG:  Compared to current    Comparison ECG info:  Jan 2019    Similarity:  No change    Comparison to cardiac monitor: Yes    Interpretation:     Interpretation: normal    Rate:     ECG rate:  76    ECG rate assessment: normal    Rhythm:     Rhythm: sinus rhythm    Comments:      Normal sinus rhythm, normal ekg           Phone Contacts  ED Phone Contact    ED Course                               MDM    Disposition  Final diagnoses:   Anxiety attack     Time reflects when diagnosis was documented in both MDM as applicable and the Disposition within this note     Time User Action Codes Description Comment    3/4/2019 11:46 PM Haydee Munson Add [F41 0] Anxiety attack       ED Disposition     ED Disposition Condition Date/Time Comment    Discharge Stable Mon Mar 4, 2019 11:46 PM Sera Monroy discharge to home/self care  Follow-up Information    None         Discharge Medication List as of 3/4/2019 11:46 PM      CONTINUE these medications which have NOT CHANGED    Details   clonazePAM (KlonoPIN) 0 5 mg tablet Take 1 tablet (0 5 mg total) by mouth 2 (two) times a day as needed for anxiety, Starting Sun 1/13/2019, Print      norgestimate-ethinyl estradiol (ORTHO TRI-CYCLEN LO) 0 18/0 215/0 25 MG-25 MCG per tablet Take 1 tablet by mouth daily, Historical Med           No discharge procedures on file      ED Provider  Electronically Signed by           Timi Aldana MD  03/05/19 050

## 2019-08-06 ENCOUNTER — HOSPITAL ENCOUNTER (EMERGENCY)
Facility: HOSPITAL | Age: 24
Discharge: HOME | End: 2019-08-07
Attending: EMERGENCY MEDICINE | Admitting: EMERGENCY MEDICINE
Payer: COMMERCIAL

## 2019-08-06 ENCOUNTER — APPOINTMENT (EMERGENCY)
Dept: RADIOLOGY | Facility: HOSPITAL | Age: 24
End: 2019-08-06
Payer: COMMERCIAL

## 2019-08-06 VITALS
RESPIRATION RATE: 16 BRPM | DIASTOLIC BLOOD PRESSURE: 73 MMHG | TEMPERATURE: 97.8 F | OXYGEN SATURATION: 100 % | HEART RATE: 73 BPM | BODY MASS INDEX: 18.74 KG/M2 | SYSTOLIC BLOOD PRESSURE: 115 MMHG | WEIGHT: 99.21 LBS

## 2019-08-06 DIAGNOSIS — R68.89 DOES NOT FEEL RIGHT: ICD-10-CM

## 2019-08-06 DIAGNOSIS — R00.2 PALPITATIONS: Primary | ICD-10-CM

## 2019-08-06 LAB
ALBUMIN SERPL BCP-MCNC: 3.8 G/DL (ref 3.5–5)
ALP SERPL-CCNC: 55 U/L (ref 46–116)
ALT SERPL W P-5'-P-CCNC: 21 U/L (ref 12–78)
ANION GAP SERPL CALCULATED.3IONS-SCNC: 5 MMOL/L (ref 4–13)
AST SERPL W P-5'-P-CCNC: 17 U/L (ref 5–45)
ATRIAL RATE: 83 BPM
BASOPHILS # BLD AUTO: 0.02 THOUSANDS/ΜL (ref 0–0.1)
BASOPHILS NFR BLD AUTO: 0 % (ref 0–1)
BILIRUB SERPL-MCNC: 0.3 MG/DL (ref 0.2–1)
BILIRUB UR QL STRIP: NEGATIVE
BUN SERPL-MCNC: 17 MG/DL (ref 5–25)
CALCIUM SERPL-MCNC: 9.7 MG/DL (ref 8.3–10.1)
CHLORIDE SERPL-SCNC: 102 MMOL/L (ref 100–108)
CLARITY UR: CLEAR
CO2 SERPL-SCNC: 33 MMOL/L (ref 21–32)
COLOR UR: YELLOW
CREAT SERPL-MCNC: 0.75 MG/DL (ref 0.6–1.3)
EOSINOPHIL # BLD AUTO: 0.06 THOUSAND/ΜL (ref 0–0.61)
EOSINOPHIL NFR BLD AUTO: 1 % (ref 0–6)
ERYTHROCYTE [DISTWIDTH] IN BLOOD BY AUTOMATED COUNT: 12.9 % (ref 11.6–15.1)
EXT PREG TEST URINE: NEGATIVE
EXT. CONTROL ED NAV: NORMAL
GFR SERPL CREATININE-BSD FRML MDRD: 114 ML/MIN/1.73SQ M
GLUCOSE SERPL-MCNC: 97 MG/DL (ref 65–140)
GLUCOSE UR STRIP-MCNC: NEGATIVE MG/DL
HCT VFR BLD AUTO: 42.5 % (ref 34.8–46.1)
HGB BLD-MCNC: 14.5 G/DL (ref 11.5–15.4)
HGB UR QL STRIP.AUTO: ABNORMAL
KETONES UR STRIP-MCNC: NEGATIVE MG/DL
LEUKOCYTE ESTERASE UR QL STRIP: ABNORMAL
LYMPHOCYTES # BLD AUTO: 3.23 THOUSANDS/ΜL (ref 0.6–4.47)
LYMPHOCYTES NFR BLD AUTO: 42 % (ref 14–44)
MCH RBC QN AUTO: 32.9 PG (ref 26.8–34.3)
MCHC RBC AUTO-ENTMCNC: 34.1 G/DL (ref 31.4–37.4)
MCV RBC AUTO: 96 FL (ref 82–98)
MONOCYTES # BLD AUTO: 0.49 THOUSAND/ΜL (ref 0.17–1.22)
MONOCYTES NFR BLD AUTO: 6 % (ref 4–12)
NEUTROPHILS # BLD AUTO: 3.89 THOUSANDS/ΜL (ref 1.85–7.62)
NEUTS SEG NFR BLD AUTO: 51 % (ref 43–75)
NITRITE UR QL STRIP: NEGATIVE
P AXIS: 24 DEGREES
PH UR STRIP.AUTO: 7 [PH]
PLATELET # BLD AUTO: 259 THOUSANDS/UL (ref 149–390)
PMV BLD AUTO: 10.2 FL (ref 8.9–12.7)
POTASSIUM SERPL-SCNC: 3.8 MMOL/L (ref 3.5–5.3)
PR INTERVAL: 102 MS
PROT SERPL-MCNC: 7.6 G/DL (ref 6.4–8.2)
PROT UR STRIP-MCNC: NEGATIVE MG/DL
QRS AXIS: 80 DEGREES
QRSD INTERVAL: 84 MS
QT INTERVAL: 394 MS
QTC INTERVAL: 462 MS
RBC # BLD AUTO: 4.41 MILLION/UL (ref 3.81–5.12)
SODIUM SERPL-SCNC: 140 MMOL/L (ref 136–145)
SP GR UR STRIP.AUTO: 1.01 (ref 1–1.03)
T WAVE AXIS: 11 DEGREES
TROPONIN I SERPL-MCNC: <0.02 NG/ML
TSH SERPL DL<=0.05 MIU/L-ACNC: 3.64 UIU/ML (ref 0.36–3.74)
UROBILINOGEN UR QL STRIP.AUTO: 0.2 E.U./DL
VENTRICULAR RATE: 83 BPM
WBC # BLD AUTO: 7.69 THOUSAND/UL (ref 4.31–10.16)

## 2019-08-06 PROCEDURE — 84443 ASSAY THYROID STIM HORMONE: CPT | Performed by: NURSE PRACTITIONER

## 2019-08-06 PROCEDURE — 96375 TX/PRO/DX INJ NEW DRUG ADDON: CPT

## 2019-08-06 PROCEDURE — 99283 EMERGENCY DEPT VISIT LOW MDM: CPT | Performed by: EMERGENCY MEDICINE

## 2019-08-06 PROCEDURE — 36415 COLL VENOUS BLD VENIPUNCTURE: CPT | Performed by: NURSE PRACTITIONER

## 2019-08-06 PROCEDURE — 93010 ELECTROCARDIOGRAM REPORT: CPT | Performed by: INTERNAL MEDICINE

## 2019-08-06 PROCEDURE — 84484 ASSAY OF TROPONIN QUANT: CPT | Performed by: NURSE PRACTITIONER

## 2019-08-06 PROCEDURE — 85025 COMPLETE CBC W/AUTO DIFF WBC: CPT | Performed by: NURSE PRACTITIONER

## 2019-08-06 PROCEDURE — 81001 URINALYSIS AUTO W/SCOPE: CPT | Performed by: NURSE PRACTITIONER

## 2019-08-06 PROCEDURE — 96361 HYDRATE IV INFUSION ADD-ON: CPT

## 2019-08-06 PROCEDURE — 96374 THER/PROPH/DIAG INJ IV PUSH: CPT

## 2019-08-06 PROCEDURE — 80053 COMPREHEN METABOLIC PANEL: CPT | Performed by: NURSE PRACTITIONER

## 2019-08-06 PROCEDURE — 86618 LYME DISEASE ANTIBODY: CPT | Performed by: NURSE PRACTITIONER

## 2019-08-06 PROCEDURE — 71046 X-RAY EXAM CHEST 2 VIEWS: CPT

## 2019-08-06 PROCEDURE — 81025 URINE PREGNANCY TEST: CPT | Performed by: NURSE PRACTITIONER

## 2019-08-06 PROCEDURE — C9113 INJ PANTOPRAZOLE SODIUM, VIA: HCPCS | Performed by: NURSE PRACTITIONER

## 2019-08-06 PROCEDURE — 99285 EMERGENCY DEPT VISIT HI MDM: CPT

## 2019-08-06 PROCEDURE — 93005 ELECTROCARDIOGRAM TRACING: CPT

## 2019-08-06 RX ORDER — DIAZEPAM 5 MG/ML
5 INJECTION, SOLUTION INTRAMUSCULAR; INTRAVENOUS ONCE
Status: COMPLETED | OUTPATIENT
Start: 2019-08-06 | End: 2019-08-07

## 2019-08-06 RX ORDER — PANTOPRAZOLE SODIUM 40 MG/1
40 INJECTION, POWDER, FOR SOLUTION INTRAVENOUS ONCE
Status: COMPLETED | OUTPATIENT
Start: 2019-08-06 | End: 2019-08-06

## 2019-08-06 RX ORDER — KETOROLAC TROMETHAMINE 30 MG/ML
15 INJECTION, SOLUTION INTRAMUSCULAR; INTRAVENOUS ONCE
Status: COMPLETED | OUTPATIENT
Start: 2019-08-06 | End: 2019-08-06

## 2019-08-06 RX ORDER — ONDANSETRON 2 MG/ML
4 INJECTION INTRAMUSCULAR; INTRAVENOUS ONCE
Status: COMPLETED | OUTPATIENT
Start: 2019-08-06 | End: 2019-08-06

## 2019-08-06 RX ADMIN — PANTOPRAZOLE SODIUM 40 MG: 40 INJECTION, POWDER, FOR SOLUTION INTRAVENOUS at 23:49

## 2019-08-06 RX ADMIN — ONDANSETRON 4 MG: 2 INJECTION INTRAMUSCULAR; INTRAVENOUS at 23:48

## 2019-08-06 RX ADMIN — SODIUM CHLORIDE 1000 ML: 0.9 INJECTION, SOLUTION INTRAVENOUS at 22:57

## 2019-08-06 RX ADMIN — KETOROLAC TROMETHAMINE 15 MG: 30 INJECTION, SOLUTION INTRAMUSCULAR at 23:49

## 2019-08-07 LAB
BACTERIA UR QL AUTO: ABNORMAL /HPF
NON-SQ EPI CELLS URNS QL MICRO: ABNORMAL /HPF
RBC #/AREA URNS AUTO: ABNORMAL /HPF
WBC #/AREA URNS AUTO: ABNORMAL /HPF

## 2019-08-07 PROCEDURE — 96375 TX/PRO/DX INJ NEW DRUG ADDON: CPT

## 2019-08-07 RX ADMIN — DIAZEPAM 5 MG: 10 INJECTION, SOLUTION INTRAMUSCULAR; INTRAVENOUS at 00:03

## 2019-08-07 NOTE — ED NOTES
Patient transported to [address] [place] [address], [address] [place] [address]  08/06/19 8822

## 2019-08-07 NOTE — ED PROVIDER NOTES
History  Chief Complaint   Patient presents with    Weakness - Generalized     pt states she has been feeling faint since last night with a headache  had palpitations earlier today     This is 25-year-old female who presents here with a chief complaint of feeling faint since last evening  She has had intermittent palpitation feelings and chest pains since that time  She reports a history of anxiety and panic attacks but this does not feel the same because it lasted longer  She does not appear to be in any acute distress or toxic appearing  No trauma  Her chest discomfort is migrating in addition, she was describing involuntary lower extremity contractions  Prior to Admission Medications   Prescriptions Last Dose Informant Patient Reported? Taking? clonazePAM (KlonoPIN) 0 5 mg tablet   No No   Sig: Take 1 tablet (0 5 mg total) by mouth 2 (two) times a day as needed for anxiety   norgestimate-ethinyl estradiol (ORTHO TRI-CYCLEN LO) 0 18/0 215/0 25 MG-25 MCG per tablet   Yes No   Sig: Take 1 tablet by mouth daily      Facility-Administered Medications: None       Past Medical History:   Diagnosis Date    Known health problems: none     Mitral valve prolapse     Psychiatric disorder     anxiety, depression    Raynaud's disease        Past Surgical History:   Procedure Laterality Date    VULVA SURGERY         History reviewed  No pertinent family history  I have reviewed and agree with the history as documented  Social History     Tobacco Use    Smoking status: Never Smoker    Smokeless tobacco: Never Used   Substance Use Topics    Alcohol use: Not Currently     Comment: "socially"    Drug use: Not Currently     Comment: hasn't used in a few months        Review of Systems   Constitutional: Negative for activity change, fatigue and fever  HENT: Negative for congestion, ear pain, rhinorrhea and sore throat  Eyes: Negative      Respiratory: Negative for cough, shortness of breath and wheezing  Cardiovascular: Positive for chest pain and palpitations  Gastrointestinal: Negative for abdominal pain, diarrhea, nausea and vomiting  Endocrine: Negative  Genitourinary: Negative for difficulty urinating, dyspareunia, dysuria, flank pain, frequency, menstrual problem, pelvic pain, urgency, vaginal bleeding, vaginal discharge and vaginal pain  Musculoskeletal: Negative for arthralgias and myalgias  Skin: Negative for color change and pallor  Neurological: Negative for dizziness, speech difficulty, weakness and headaches  Hematological: Negative for adenopathy  Psychiatric/Behavioral: Negative for confusion  Physical Exam  Physical Exam   Constitutional: She is oriented to person, place, and time  She appears well-developed and well-nourished  She is cooperative  Non-toxic appearance  She does not have a sickly appearance  She does not appear ill  No distress  HENT:   Head: Normocephalic and atraumatic  Right Ear: Tympanic membrane and external ear normal    Left Ear: Tympanic membrane and external ear normal    Nose: No rhinorrhea, sinus tenderness or nasal deformity  No epistaxis  Right sinus exhibits no maxillary sinus tenderness and no frontal sinus tenderness  Left sinus exhibits no maxillary sinus tenderness and no frontal sinus tenderness  Mouth/Throat: Oropharynx is clear and moist and mucous membranes are normal  Normal dentition  Eyes: Pupils are equal, round, and reactive to light  EOM are normal    Neck: Normal range of motion  Neck supple  Cardiovascular: Normal rate, regular rhythm and normal heart sounds  No murmur heard  Pulmonary/Chest: Effort normal and breath sounds normal  No accessory muscle usage  No respiratory distress  She has no wheezes  She has no rales  She exhibits no tenderness  Abdominal: Soft  She exhibits no distension  There is no tenderness  There is no guarding  Musculoskeletal: Normal range of motion   She exhibits no edema or tenderness  Lymphadenopathy:     She has no cervical adenopathy  Neurological: She is alert and oriented to person, place, and time  She exhibits normal muscle tone  Skin: Skin is warm and dry  No rash noted  No erythema  Psychiatric: She has a normal mood and affect  Nursing note and vitals reviewed  Vital Signs  ED Triage Vitals   Temperature Pulse Respirations Blood Pressure SpO2   08/06/19 2240 08/06/19 2221 08/06/19 2221 08/06/19 2221 08/06/19 2221   97 8 °F (36 6 °C) 73 16 115/73 100 %      Temp Source Heart Rate Source Patient Position - Orthostatic VS BP Location FiO2 (%)   08/06/19 2240 -- -- -- --   Oral          Pain Score       --                  Vitals:    08/06/19 2221   BP: 115/73   Pulse: 73         Visual Acuity      ED Medications  Medications   sodium chloride 0 9 % bolus 1,000 mL (1,000 mL Intravenous New Bag 8/6/19 2257)   ondansetron (ZOFRAN) injection 4 mg (has no administration in time range)   pantoprazole (PROTONIX) injection 40 mg (has no administration in time range)   diazepam (VALIUM) injection 5 mg (has no administration in time range)   ketorolac (TORADOL) injection 15 mg (has no administration in time range)       Diagnostic Studies  Results Reviewed     Procedure Component Value Units Date/Time    UA w Reflex to Microscopic w Reflex to Culture [[id number]]  (Abnormal) Collected:  08/06/19 2340    Lab Status:  Final result Specimen:  Urine, Clean Catch Updated:  08/06/19 2348     Color, UA Yellow     Clarity, UA Clear     Specific Gravity, UA 1 010     pH, UA 7 0     Leukocytes, UA Small     Nitrite, UA Negative     Protein, UA Negative mg/dl      Glucose, UA Negative mg/dl      Ketones, UA Negative mg/dl      Urobilinogen, UA 0 2 E U /dl      Bilirubin, UA Negative     Blood, UA Trace-Intact    Urine Microscopic [[id number]] Collected:  08/06/19 2340    Lab Status:   In process Specimen:  Urine, Clean Catch Updated:  08/06/19 2348    POCT pregnancy, urine [[id number]] (Normal) Resulted:  08/06/19 2344    Lab Status:  Final result Updated:  08/06/19 2344     EXT PREG TEST UR (Ref: Negative) negative     Control valid    TSH [[id number]]  (Normal) Collected:  08/06/19 2256    Lab Status:  Final result Specimen:  Blood from Arm, Right Updated:  08/06/19 2332     TSH 3RD GENERATON 3 644 uIU/mL     Narrative:       Patients undergoing fluorescein dye angiography may retain small amounts of fluorescein in the body for 48-72 hours post procedure  Samples containing fluorescein can produce falsely depressed TSH values  If the patient had this procedure,a specimen should be resubmitted post fluorescein clearance        Troponin I [[id number]]  (Normal) Collected:  08/06/19 2256    Lab Status:  Final result Specimen:  Blood from Arm, Right Updated:  08/06/19 2328     Troponin I <0 02 ng/mL     Comprehensive metabolic panel [[id number]]  (Abnormal) Collected:  08/06/19 2256    Lab Status:  Final result Specimen:  Blood from Arm, Right Updated:  08/06/19 2324     Sodium 140 mmol/L      Potassium 3 8 mmol/L      Chloride 102 mmol/L      CO2 33 mmol/L      ANION GAP 5 mmol/L      BUN 17 mg/dL      Creatinine 0 75 mg/dL      Glucose 97 mg/dL      Calcium 9 7 mg/dL      AST 17 U/L      ALT 21 U/L      Alkaline Phosphatase 55 U/L      Total Protein 7 6 g/dL      Albumin 3 8 g/dL      Total Bilirubin 0 30 mg/dL      eGFR 114 ml/min/1 73sq m     Narrative:       [facility] guidelines for Chronic Kidney Disease (CKD):     Stage 1 with normal or high GFR (GFR > 90 mL/min/1 73 square meters)    Stage 2 Mild CKD (GFR = 60-89 mL/min/1 73 square meters)    Stage 3A Moderate CKD (GFR = 45-59 mL/min/1 73 square meters)    Stage 3B Moderate CKD (GFR = 30-44 mL/min/1 73 square meters)    Stage 4 Severe CKD (GFR = 15-29 mL/min/1 73 square meters)    Stage 5 End Stage CKD (GFR <15 mL/min/1 73 square meters)  Note: GFR calculation is accurate only with a steady state creatinine    CBC and differential [[id number]]  (Normal) Collected:  08/06/19 2256    Lab Status:  Final result Specimen:  Blood from Arm, Right Updated:  08/06/19 2303     WBC 7 69 Thousand/uL      RBC 4 41 Million/uL      Hemoglobin 14 5 g/dL      Hematocrit 42 5 %      MCV 96 fL      MCH 32 9 pg      MCHC 34 1 g/dL      RDW 12 9 %      MPV 10 2 fL      Platelets 885 Thousands/uL      Neutrophils Relative 51 %      Lymphocytes Relative 42 %      Monocytes Relative 6 %      Eosinophils Relative 1 %      Basophils Relative 0 %      Neutrophils Absolute 3 89 Thousands/µL      Lymphocytes Absolute 3 23 Thousands/µL      Monocytes Absolute 0 49 Thousand/µL      Eosinophils Absolute 0 06 Thousand/µL      Basophils Absolute 0 02 Thousands/µL     Lyme Antibody Profile with reflex to [facility] [[id number]] Collected:  08/06/19 2256    Lab Status:   In process Specimen:  Blood from Arm, Right Updated:  08/06/19 2300                 XR chest 2 views   ED Interpretation by Vania Ortega (08/06 2335)   No acute cardiopulmonary findings                 Procedures  Procedures       ED Course                               MDM  Number of Diagnoses or Management Options  Does not feel right: new and requires workup  Palpitations: new and requires workup     Amount and/or Complexity of Data Reviewed  Clinical lab tests: reviewed and ordered  Tests in the radiology section of CPT®: reviewed and ordered  Tests in the medicine section of CPT®: reviewed  Discuss the patient with other providers: yes  Independent visualization of images, tracings, or specimens: yes    Patient Progress  Patient progress: stable      Disposition  Final diagnoses:   Palpitations   Does not feel right     Time reflects when diagnosis was documented in both MDM as applicable and the Disposition within this note     Time User Action Codes Description Comment    8/6/2019 11:40 PM Lino Cordero Add [R00 2] Palpitations     8/6/2019 11:40 PM Sivan Patino [R68 89] Does not feel right ED Disposition     ED Disposition Condition Date/Time Comment    Discharge Stable Tue Aug 6, 2019 11:40 PM Sushant Islas discharge to home/self care  Follow-up Information     Follow up With Specialties Details Why Contact Info    Evy Flor MD Internal Medicine Schedule an appointment as soon as possible for a visit  For Continued Evaluation [address] [place] [address]  [phone]            Patient's Medications   Discharge Prescriptions    No medications on file     No discharge procedures on file      ED Provider  Electronically Signed by           SETH Bob  08/07/19 0698

## 2019-08-08 LAB
B BURGDOR IGG SER IA-ACNC: 0.31
B BURGDOR IGM SER IA-ACNC: 0.3

## 2019-09-05 ENCOUNTER — HOSPITAL ENCOUNTER (EMERGENCY)
Facility: HOSPITAL | Age: 24
Discharge: HOME | End: 2019-09-06
Attending: EMERGENCY MEDICINE | Admitting: EMERGENCY MEDICINE
Payer: COMMERCIAL

## 2019-09-05 DIAGNOSIS — R07.89 CHEST WALL PAIN: Primary | ICD-10-CM

## 2019-09-05 DIAGNOSIS — R82.90 ABNORMAL URINALYSIS: ICD-10-CM

## 2019-09-05 LAB
ANION GAP SERPL CALCULATED.3IONS-SCNC: 5 MMOL/L (ref 4–13)
ATRIAL RATE: 82 BPM
BACTERIA UR QL AUTO: ABNORMAL /HPF
BASOPHILS # BLD AUTO: 0.04 THOUSANDS/ΜL (ref 0–0.1)
BASOPHILS NFR BLD AUTO: 0 % (ref 0–1)
BILIRUB UR QL STRIP: NEGATIVE
BUN SERPL-MCNC: 15 MG/DL (ref 5–25)
CALCIUM SERPL-MCNC: 8.9 MG/DL (ref 8.3–10.1)
CHLORIDE SERPL-SCNC: 101 MMOL/L (ref 100–108)
CLARITY UR: CLEAR
CO2 SERPL-SCNC: 31 MMOL/L (ref 21–32)
COLOR UR: YELLOW
CREAT SERPL-MCNC: 0.69 MG/DL (ref 0.6–1.3)
EOSINOPHIL # BLD AUTO: 0.05 THOUSAND/ΜL (ref 0–0.61)
EOSINOPHIL NFR BLD AUTO: 1 % (ref 0–6)
ERYTHROCYTE [DISTWIDTH] IN BLOOD BY AUTOMATED COUNT: 12.4 % (ref 11.6–15.1)
EXT PREG TEST URINE: NEGATIVE
EXT. CONTROL ED NAV: NORMAL
GFR SERPL CREATININE-BSD FRML MDRD: 124 ML/MIN/1.73SQ M
GLUCOSE SERPL-MCNC: 94 MG/DL (ref 65–140)
GLUCOSE UR STRIP-MCNC: NEGATIVE MG/DL
HCT VFR BLD AUTO: 41.1 % (ref 34.8–46.1)
HGB BLD-MCNC: 14.1 G/DL (ref 11.5–15.4)
HGB UR QL STRIP.AUTO: NEGATIVE
IMM GRANULOCYTES # BLD AUTO: 0.01 THOUSAND/UL (ref 0–0.2)
IMM GRANULOCYTES NFR BLD AUTO: 0 % (ref 0–2)
KETONES UR STRIP-MCNC: NEGATIVE MG/DL
LEUKOCYTE ESTERASE UR QL STRIP: ABNORMAL
LYMPHOCYTES # BLD AUTO: 2.94 THOUSANDS/ΜL (ref 0.6–4.47)
LYMPHOCYTES NFR BLD AUTO: 33 % (ref 14–44)
MCH RBC QN AUTO: 32.9 PG (ref 26.8–34.3)
MCHC RBC AUTO-ENTMCNC: 34.3 G/DL (ref 31.4–37.4)
MCV RBC AUTO: 96 FL (ref 82–98)
MONOCYTES # BLD AUTO: 0.51 THOUSAND/ΜL (ref 0.17–1.22)
MONOCYTES NFR BLD AUTO: 6 % (ref 4–12)
NEUTROPHILS # BLD AUTO: 5.35 THOUSANDS/ΜL (ref 1.85–7.62)
NEUTS SEG NFR BLD AUTO: 60 % (ref 43–75)
NITRITE UR QL STRIP: NEGATIVE
NON-SQ EPI CELLS URNS QL MICRO: ABNORMAL /HPF
NRBC BLD AUTO-RTO: 0 /100 WBCS
P AXIS: 32 DEGREES
PH UR STRIP.AUTO: 5.5 [PH]
PLATELET # BLD AUTO: 242 THOUSANDS/UL (ref 149–390)
PMV BLD AUTO: 10.4 FL (ref 8.9–12.7)
POTASSIUM SERPL-SCNC: 3.7 MMOL/L (ref 3.5–5.3)
PR INTERVAL: 94 MS
PROT UR STRIP-MCNC: NEGATIVE MG/DL
QRS AXIS: 88 DEGREES
QRSD INTERVAL: 74 MS
QT INTERVAL: 350 MS
QTC INTERVAL: 408 MS
RBC # BLD AUTO: 4.28 MILLION/UL (ref 3.81–5.12)
RBC #/AREA URNS AUTO: ABNORMAL /HPF
SODIUM SERPL-SCNC: 137 MMOL/L (ref 136–145)
SP GR UR STRIP.AUTO: 1.02 (ref 1–1.03)
T WAVE AXIS: -22 DEGREES
TROPONIN I SERPL-MCNC: <0.02 NG/ML
UROBILINOGEN UR QL STRIP.AUTO: 0.2 E.U./DL
VENTRICULAR RATE: 82 BPM
WBC # BLD AUTO: 8.9 THOUSAND/UL (ref 4.31–10.16)
WBC #/AREA URNS AUTO: ABNORMAL /HPF

## 2019-09-05 PROCEDURE — 87147 CULTURE TYPE IMMUNOLOGIC: CPT | Performed by: PHYSICIAN ASSISTANT

## 2019-09-05 PROCEDURE — 99285 EMERGENCY DEPT VISIT HI MDM: CPT

## 2019-09-05 PROCEDURE — 80076 HEPATIC FUNCTION PANEL: CPT | Performed by: PHYSICIAN ASSISTANT

## 2019-09-05 PROCEDURE — 85379 FIBRIN DEGRADATION QUANT: CPT | Performed by: PHYSICIAN ASSISTANT

## 2019-09-05 PROCEDURE — 84484 ASSAY OF TROPONIN QUANT: CPT | Performed by: PHYSICIAN ASSISTANT

## 2019-09-05 PROCEDURE — 93010 ELECTROCARDIOGRAM REPORT: CPT | Performed by: INTERNAL MEDICINE

## 2019-09-05 PROCEDURE — 85730 THROMBOPLASTIN TIME PARTIAL: CPT | Performed by: PHYSICIAN ASSISTANT

## 2019-09-05 PROCEDURE — 36415 COLL VENOUS BLD VENIPUNCTURE: CPT | Performed by: PHYSICIAN ASSISTANT

## 2019-09-05 PROCEDURE — 85610 PROTHROMBIN TIME: CPT | Performed by: PHYSICIAN ASSISTANT

## 2019-09-05 PROCEDURE — 81025 URINE PREGNANCY TEST: CPT | Performed by: PHYSICIAN ASSISTANT

## 2019-09-05 PROCEDURE — 96360 HYDRATION IV INFUSION INIT: CPT

## 2019-09-05 PROCEDURE — 93005 ELECTROCARDIOGRAM TRACING: CPT

## 2019-09-05 PROCEDURE — 84443 ASSAY THYROID STIM HORMONE: CPT | Performed by: PHYSICIAN ASSISTANT

## 2019-09-05 PROCEDURE — 87086 URINE CULTURE/COLONY COUNT: CPT | Performed by: PHYSICIAN ASSISTANT

## 2019-09-05 PROCEDURE — 99283 EMERGENCY DEPT VISIT LOW MDM: CPT | Performed by: PHYSICIAN ASSISTANT

## 2019-09-05 PROCEDURE — 81001 URINALYSIS AUTO W/SCOPE: CPT | Performed by: PHYSICIAN ASSISTANT

## 2019-09-05 PROCEDURE — 85025 COMPLETE CBC W/AUTO DIFF WBC: CPT | Performed by: PHYSICIAN ASSISTANT

## 2019-09-05 PROCEDURE — 80048 BASIC METABOLIC PNL TOTAL CA: CPT | Performed by: PHYSICIAN ASSISTANT

## 2019-09-05 RX ORDER — IBUPROFEN 400 MG/1
400 TABLET ORAL ONCE
Status: COMPLETED | OUTPATIENT
Start: 2019-09-05 | End: 2019-09-05

## 2019-09-05 RX ADMIN — SODIUM CHLORIDE 1000 ML: 0.9 INJECTION, SOLUTION INTRAVENOUS at 23:24

## 2019-09-05 RX ADMIN — IBUPROFEN 400 MG: 400 TABLET ORAL at 23:18

## 2019-09-06 ENCOUNTER — APPOINTMENT (EMERGENCY)
Dept: RADIOLOGY | Facility: HOSPITAL | Age: 24
End: 2019-09-06
Payer: COMMERCIAL

## 2019-09-06 VITALS
TEMPERATURE: 98.5 F | OXYGEN SATURATION: 96 % | SYSTOLIC BLOOD PRESSURE: 111 MMHG | BODY MASS INDEX: 18.73 KG/M2 | HEIGHT: 61 IN | RESPIRATION RATE: 16 BRPM | DIASTOLIC BLOOD PRESSURE: 72 MMHG | WEIGHT: 99.21 LBS | HEART RATE: 70 BPM

## 2019-09-06 LAB
ALBUMIN SERPL BCP-MCNC: 3.6 G/DL (ref 3.5–5)
ALP SERPL-CCNC: 52 U/L (ref 46–116)
ALT SERPL W P-5'-P-CCNC: 18 U/L (ref 12–78)
APTT PPP: 26 SECONDS (ref 23–37)
AST SERPL W P-5'-P-CCNC: 16 U/L (ref 5–45)
BILIRUB DIRECT SERPL-MCNC: 0.05 MG/DL (ref 0–0.2)
BILIRUB SERPL-MCNC: 0.3 MG/DL (ref 0.2–1)
DEPRECATED D DIMER PPP: <270 NG/ML (FEU)
INR PPP: 1.04 (ref 0.84–1.19)
PROT SERPL-MCNC: 7.5 G/DL (ref 6.4–8.2)
PROTHROMBIN TIME: 13.6 SECONDS (ref 11.6–14.5)
TSH SERPL DL<=0.05 MIU/L-ACNC: 1.75 UIU/ML (ref 0.36–3.74)

## 2019-09-06 PROCEDURE — 71046 X-RAY EXAM CHEST 2 VIEWS: CPT

## 2019-09-06 RX ORDER — ACETAMINOPHEN 325 MG/1
650 TABLET ORAL ONCE
Status: COMPLETED | OUTPATIENT
Start: 2019-09-06 | End: 2019-09-06

## 2019-09-06 RX ORDER — IBUPROFEN 400 MG/1
400 TABLET ORAL EVERY 6 HOURS PRN
Qty: 16 TABLET | Refills: 0 | Status: SHIPPED | OUTPATIENT
Start: 2019-09-06 | End: 2019-12-02

## 2019-09-06 RX ORDER — CEPHALEXIN 500 MG/1
500 CAPSULE ORAL EVERY 12 HOURS SCHEDULED
Qty: 10 CAPSULE | Refills: 0 | Status: SHIPPED | OUTPATIENT
Start: 2019-09-06 | End: 2019-09-11

## 2019-09-06 RX ADMIN — ACETAMINOPHEN 650 MG: 325 TABLET ORAL at 00:58

## 2019-09-06 NOTE — DISCHARGE INSTRUCTIONS
Take Motrin as indicated  Take Keflex as needed  Follow-up with cardiology  Follow-up with PCP  Follow up emergency department symptoms persist or exacerbate

## 2019-09-06 NOTE — ED PROVIDER NOTES
History  Chief Complaint   Patient presents with    Chest Pain     Pt c/o cp since last night  Patient is a 81-year-old female with history of mitral valve prolapse anxiety, depression, and vulva surgery that presents emergency department with dull and achy and sharp nonradiating left-sided chest pain for 1 day  Patient verbalizes that chest pain started yesterday with gradual onset  Patient is currently seen by Cardiology  Patient states that she had recently been taking Klonopin  Patient verbalizes palliative factors of rest with provocative factors of position  Patient denies not effective treatment  Patient denies fevers or chills, nausea, vomiting  Patient denies diarrhea, constipation urinary symptoms  Patient denies headaches, tinnitus, meningeal, and vertiginous symptoms  Patient affirms bouts of swaying dizziness approximately 1-2 minutes induration from sitting to standing position  Patient denies not effective treatment  Patient denies fevers, chills, nausea, and vomiting  Patient denies diarrhea, constipation, and urinary symptoms  Patient denies headaches, dizziness, tinnitus, meningeal, and vertiginous symptoms  Patient denies numbness, tingling, and loss of power  Patient denies sick contacts and recent travel  Patient denies recent fall and recent trauma  Patient denies chest pain, shortness of breath, and abdominal pain  Patient is not in acute distress  History provided by:  Patient   used: No    Chest Pain   Pain location:  L lateral chest and L chest  Pain quality: aching and pressure    Pain radiates to the back: no    Pain severity:  Mild  Onset quality:  Gradual  Duration:  1 day  Timing:  Constant  Progression:  Partially resolved  Chronicity:  Recurrent  Context: breathing and at rest    Context: no drug use, not eating, no intercourse, not lifting, no movement, no stress and no trauma    Relieved by: relaxing    Worsened by:  Exertion and deep breathing  Ineffective treatments:  None tried  Associated symptoms: anxiety, dizziness and weakness    Associated symptoms: no abdominal pain, no AICD problem, no altered mental status, no anorexia, no back pain, no claudication, no cough, no diaphoresis, no dysphagia, no fatigue, no fever, no headache, no heartburn, no lower extremity edema, no nausea, no near-syncope, no numbness, no orthopnea, no palpitations, no shortness of breath, no syncope and not vomiting    Risk factors: birth control    Risk factors: no diabetes mellitus, no high cholesterol, no hypertension, no prior DVT/PE and no smoking    Risk factors comment:  Mitral valve disorder      Prior to Admission Medications   Prescriptions Last Dose Informant Patient Reported? Taking? clonazePAM (KlonoPIN) 0 5 mg tablet   No No   Sig: Take 1 tablet (0 5 mg total) by mouth 2 (two) times a day as needed for anxiety   norgestimate-ethinyl estradiol (ORTHO TRI-CYCLEN LO) 0 18/0 215/0 25 MG-25 MCG per tablet   Yes No   Sig: Take 1 tablet by mouth daily      Facility-Administered Medications: None       Past Medical History:   Diagnosis Date    Known health problems: none     Mitral valve prolapse     Psychiatric disorder     anxiety, depression    Raynaud's disease        Past Surgical History:   Procedure Laterality Date    VULVA SURGERY         History reviewed  No pertinent family history  I have reviewed and agree with the history as documented  Social History     Tobacco Use    Smoking status: Never Smoker    Smokeless tobacco: Never Used   Substance Use Topics    Alcohol use: Not Currently     Comment: "socially"    Drug use: Not Currently     Comment: hasn't used in a few months        Review of Systems   Constitutional: Negative for activity change, appetite change, chills, diaphoresis, fatigue and fever  HENT: Negative for congestion, postnasal drip, rhinorrhea, sinus pressure, sinus pain, sore throat, tinnitus and trouble swallowing  Eyes: Negative for photophobia and visual disturbance  Respiratory: Negative for cough, chest tightness and shortness of breath  Cardiovascular: Positive for chest pain  Negative for palpitations, orthopnea, claudication, syncope and near-syncope  Gastrointestinal: Negative for abdominal pain, anorexia, constipation, diarrhea, heartburn, nausea and vomiting  Genitourinary: Negative for difficulty urinating, dysuria, flank pain, frequency and urgency  Musculoskeletal: Negative for back pain, gait problem, neck pain and neck stiffness  Skin: Negative for pallor and rash  Allergic/Immunologic: Negative for environmental allergies and food allergies  Neurological: Positive for dizziness and weakness  Negative for numbness and headaches  Psychiatric/Behavioral: Negative for confusion  All other systems reviewed and are negative  Physical Exam  Physical Exam   Constitutional: She is oriented to person, place, and time  Vital signs are normal  She appears well-developed and well-nourished  She is active and cooperative  Non-toxic appearance  She does not have a sickly appearance  She does not appear ill  No distress  Patient appropriate for physical examination  Patient in no acute distress  Patient with verbalization using 6-8 word sentences of current symptomatology leading to ED presentation  HENT:   Head: Normocephalic and atraumatic  Right Ear: Hearing, tympanic membrane, external ear and ear canal normal  No drainage, swelling or tenderness  No mastoid tenderness  No decreased hearing is noted  Left Ear: Hearing, tympanic membrane, external ear and ear canal normal  No drainage, swelling or tenderness  No mastoid tenderness  No decreased hearing is noted  Nose: Nose normal  Right sinus exhibits no maxillary sinus tenderness and no frontal sinus tenderness  Left sinus exhibits no maxillary sinus tenderness and no frontal sinus tenderness     Mouth/Throat: Uvula is midline, oropharynx is clear and moist and mucous membranes are normal    Eyes: Pupils are equal, round, and reactive to light  Conjunctivae, EOM and lids are normal  Right eye exhibits no discharge  Left eye exhibits no discharge  Neck: Trachea normal, normal range of motion, full passive range of motion without pain and phonation normal  Neck supple  No JVD present  No tracheal tenderness, no spinous process tenderness and no muscular tenderness present  No neck rigidity  No tracheal deviation and normal range of motion present  Cardiovascular: Normal rate, regular rhythm, normal heart sounds, intact distal pulses and normal pulses  Pulses:       Radial pulses are 2+ on the right side, and 2+ on the left side  Posterior tibial pulses are 2+ on the right side, and 2+ on the left side  Pulmonary/Chest: Effort normal and breath sounds normal  No stridor  She has no decreased breath sounds  She has no wheezes  She has no rhonchi  She has no rales  She exhibits no tenderness, no bony tenderness and no crepitus  Abdominal: Soft  Normal appearance and bowel sounds are normal  She exhibits no distension  There is no tenderness  There is no rigidity, no rebound, no guarding, no CVA tenderness, no tenderness at McBurney's point and negative Lopez's sign  Musculoskeletal: Normal range of motion  Passive ROM intact  Upper and lower extremity 5/5 bilaterally  Neurovascularly intact  No grinding or clicking of joints     Lymphadenopathy:        Head (right side): No submental, no submandibular, no tonsillar, no preauricular, no posterior auricular and no occipital adenopathy present  Head (left side): No submental, no submandibular, no tonsillar, no preauricular, no posterior auricular and no occipital adenopathy present  She has no cervical adenopathy  Right cervical: No superficial cervical, no deep cervical and no posterior cervical adenopathy present         Left cervical: No superficial cervical, no deep cervical and no posterior cervical adenopathy present  Neurological: She is alert and oriented to person, place, and time  She has normal strength and normal reflexes  No sensory deficit  GCS eye subscore is 4  GCS verbal subscore is 5  GCS motor subscore is 6  Reflex Scores:       Patellar reflexes are 2+ on the right side and 2+ on the left side  Skin: Skin is warm and intact  Capillary refill takes less than 2 seconds  She is not diaphoretic  Psychiatric: She has a normal mood and affect  Her speech is normal and behavior is normal  Judgment and thought content normal  Cognition and memory are normal    Nursing note and vitals reviewed  Vital Signs  ED Triage Vitals [09/05/19 2117]   Temperature Pulse Respirations Blood Pressure SpO2   98 5 °F (36 9 °C) 83 18 116/66 99 %      Temp Source Heart Rate Source Patient Position - Orthostatic VS BP Location FiO2 (%)   Oral Monitor Sitting Left arm --      Pain Score       4           Vitals:    09/05/19 2117 09/06/19 0000 09/06/19 0100   BP: 116/66 110/66 111/72   Pulse: 83 74 70   Patient Position - Orthostatic VS: Sitting Sitting Sitting         Visual Acuity      ED Medications  Medications   ibuprofen (MOTRIN) tablet 400 mg (400 mg Oral Given 9/5/19 2318)   sodium chloride 0 9 % bolus 1,000 mL (0 mL Intravenous Stopped 9/6/19 0024)   acetaminophen (TYLENOL) tablet 650 mg (650 mg Oral Given 9/6/19 0058)       Diagnostic Studies  Results Reviewed     Procedure Component Value Units Date/Time    Urine culture [[id number]] Collected:  09/05/19 2253    Lab Status:   In process Specimen:  Urine, Clean Catch Updated:  09/06/19 0056    D-Dimer [[id number]]  (Normal) Collected:  09/05/19 2324    Lab Status:  Final result Specimen:  Blood from Arm, Right Updated:  09/06/19 0024     D-Dimer, Quant <270 ng/ml (FEU)     Protime-INR [[id number]]  (Normal) Collected:  09/05/19 2324    Lab Status:  Final result Specimen:  Blood from Arm, Right Updated: 09/06/19 0008     Protime 13 6 seconds      INR 1 04    APTT [[id number]]  (Normal) Collected:  09/05/19 2324    Lab Status:  Final result Specimen:  Blood from Arm, Right Updated:  09/06/19 0008     PTT 26 seconds     Hepatic function panel [[id number]]  (Normal) Collected:  09/05/19 2324    Lab Status:  Final result Specimen:  Blood from Arm, Right Updated:  09/06/19 0000     Total Bilirubin 0 30 mg/dL      Bilirubin, Direct 0 05 mg/dL      Alkaline Phosphatase 52 U/L      AST 16 U/L      ALT 18 U/L      Total Protein 7 5 g/dL      Albumin 3 6 g/dL     TSH [[id number]]  (Normal) Collected:  09/05/19 2324    Lab Status:  Final result Specimen:  Blood from Arm, Right Updated:  09/06/19 0000     TSH 3RD GENERATON 1 750 uIU/mL     Narrative:       Patients undergoing fluorescein dye angiography may retain small amounts of fluorescein in the body for 48-72 hours post procedure  Samples containing fluorescein can produce falsely depressed TSH values  If the patient had this procedure,a specimen should be resubmitted post fluorescein clearance        Troponin I [[id number]]  (Normal) Collected:  09/05/19 2324    Lab Status:  Final result Specimen:  Blood from Arm, Right Updated:  09/05/19 2356     Troponin I <0 02 ng/mL     Basic metabolic panel [[id number]] Collected:  09/05/19 2324    Lab Status:  Final result Specimen:  Blood from Arm, Right Updated:  09/05/19 2351     Sodium 137 mmol/L      Potassium 3 7 mmol/L      Chloride 101 mmol/L      CO2 31 mmol/L      ANION GAP 5 mmol/L      BUN 15 mg/dL      Creatinine 0 69 mg/dL      Glucose 94 mg/dL      Calcium 8 9 mg/dL      eGFR 124 ml/min/1 73sq m     Narrative:       [facility] guidelines for Chronic Kidney Disease (CKD):     Stage 1 with normal or high GFR (GFR > 90 mL/min/1 73 square meters)    Stage 2 Mild CKD (GFR = 60-89 mL/min/1 73 square meters)    Stage 3A Moderate CKD (GFR = 45-59 mL/min/1 73 square meters)    Stage 3B Moderate CKD (GFR = 30-44 mL/min/1 73 square meters)    Stage 4 Severe CKD (GFR = 15-29 mL/min/1 73 square meters)    Stage 5 End Stage CKD (GFR <15 mL/min/1 73 square meters)  Note: GFR calculation is accurate only with a steady state creatinine    CBC and differential [[id number]] Collected:  09/05/19 2324    Lab Status:  Final result Specimen:  Blood from Arm, Right Updated:  09/05/19 2336     WBC 8 90 Thousand/uL      RBC 4 28 Million/uL      Hemoglobin 14 1 g/dL      Hematocrit 41 1 %      MCV 96 fL      MCH 32 9 pg      MCHC 34 3 g/dL      RDW 12 4 %      MPV 10 4 fL      Platelets 010 Thousands/uL      nRBC 0 /100 WBCs      Neutrophils Relative 60 %      Immat GRANS % 0 %      Lymphocytes Relative 33 %      Monocytes Relative 6 %      Eosinophils Relative 1 %      Basophils Relative 0 %      Neutrophils Absolute 5 35 Thousands/µL      Immature Grans Absolute 0 01 Thousand/uL      Lymphocytes Absolute 2 94 Thousands/µL      Monocytes Absolute 0 51 Thousand/µL      Eosinophils Absolute 0 05 Thousand/µL      Basophils Absolute 0 04 Thousands/µL     Urine Microscopic [[id number]]  (Abnormal) Collected:  09/05/19 2253    Lab Status:  Final result Specimen:  Urine, Clean Catch Updated:  09/05/19 2328     RBC, UA None Seen /hpf      WBC, UA 4-10 /hpf      Epithelial Cells Occasional /hpf      Bacteria, UA Occasional /hpf     POCT pregnancy, urine [[id number]]  (Normal) Resulted:  09/05/19 2301    Lab Status:  Final result Updated:  09/05/19 2301     EXT PREG TEST UR (Ref: Negative) Negative     Control Valid    UA w Reflex to Microscopic w Reflex to Culture [[id number]]  (Abnormal) Collected:  09/05/19 2253    Lab Status:  Final result Specimen:  Urine, Clean Catch Updated:  09/05/19 2300     Color, UA Yellow     Clarity, UA Clear     Specific Randolph, UA 1 020     pH, UA 5 5     Leukocytes, UA Small     Nitrite, UA Negative     Protein, UA Negative mg/dl      Glucose, UA Negative mg/dl      Ketones, UA Negative mg/dl Urobilinogen, UA 0 2 E U /dl      Bilirubin, UA Negative     Blood, UA Negative                 XR chest 2 views    (Results Pending)              Procedures  Procedures       ED Course  ED Course as of Sep 06 0608   Thu Sep 05, 2019   2258 Patient verbalizes not taking Klonopin at this time      2352 Orthostatic  Lying 115/69 with a heart rate of 70 with SP O2 sat of 99  Sitting 112/71 with a rate of 69 SP O2 sat of 100  Standing 117/72 with heart rate of 93 SP O2 saturation of 100  Standing for 3 minutes and 16/70 with a rate of 95 SP O2 sat of 99      Fri Sep 06, 2019   0044 WBC, UA(!): 4-10         HEART Risk Score      Most Recent Value   History  1 Filed at: 09/06/2019 0006   ECG  0 Filed at: 09/06/2019 0006   Age  0 Filed at: 09/06/2019 0006   Risk Factors  0 Filed at: 09/06/2019 0006   Troponin  0 Filed at: 09/06/2019 0006   Heart Score Risk Calculator   History  1 Filed at: 09/06/2019 0006   ECG  0 Filed at: 09/06/2019 0006   Age  0 Filed at: 09/06/2019 0006   Risk Factors  0 Filed at: 09/06/2019 0006   Troponin  0 Filed at: 09/06/2019 0006   HEART Score  1 Filed at: 09/06/2019 0006   HEART Score  1 Filed at: 09/06/2019 0006                      Wells' Criteria for PE      Most Recent Value   Wells' Criteria for PE   Clinical signs and symptoms of DVT  0 Filed at: 09/05/2019 2310   PE is primary diagnosis or equally likely  0 Filed at: 09/05/2019 2310   HR >100  0 Filed at: 09/05/2019 2310   Immobilization at least 3 days or Surgery in the previous 4 weeks  0 Filed at: 09/05/2019 2310   Previous, objectively diagnosed PE or DVT  0 Filed at: 09/05/2019 2310   Hemoptysis  0 Filed at: 09/05/2019 2310   Malignancy with treatment within 6 months or palliative  0 Filed at: 09/05/2019 2310   Wells' Criteria Total  0 Filed at: 09/05/2019 2310            MDM  Number of Diagnoses or Management Options  Abnormal urinalysis: new and does not require workup  Chest wall pain: new and does not require workup     Amount and/or Complexity of Data Reviewed  Clinical lab tests: ordered and reviewed  Tests in the radiology section of CPT®: ordered and reviewed  Review and summarize past medical records: yes    Risk of Complications, Morbidity, and/or Mortality  Presenting problems: low  Diagnostic procedures: moderate  Management options: moderate    Patient Progress  Patient progress: stable    Patient is a 26-year-old female with history of mitral valve prolapse anxiety, depression, and vulva surgery that presents emergency department with dull and achy and sharp nonradiating left-sided chest pain for 1 day  Patient verbalizes that chest pain started yesterday with gradual onset  Patient is currently seen by Cardiology  Patient states that she had recently been taking Klonopin  Patient verbalizes palliative factors of rest with provocative factors of position  Patient denies not effective treatment  ECG with normal sinus rhythm HR 82; negative troponin  Normal clinical blood labs; TSH, PT INR, hepatic function panel, BMP, CBC and differential  D-dimer negative  Urinalysis with 4-12 WBC with occasional bacteria and epithelial cells noted patient has no dysuria  Will prescribe patient Keflex to be used should dysuria symptomatology symptoms manifest   Chest x-ray with no acute cardiopulmonary disease on initial read  Patient ambulated assistance of persons, cane, assistive ambulatory device with heart rate in 90s with spO2 saturation 98% with no difficulty for approximately 200 feet  Delta troponin and delta ECG had not been performed due to patient verbalization of chest symptomatology occurring for 1 day  Will have patient follow up with Cardiology for further evaluation    diagnosis of chest wall pain  Follow-up with PCP  Follow up with emergency department if symptoms persist or exacerbate  Prescribed Motrin and counseled patient medication administration and side effects  Patient with verbal understanding of all imaging and laboratory findings, discharge instructions, follow-up and verbally agrees with treatment plan    Disposition  Final diagnoses:   Chest wall pain   Abnormal urinalysis - 4-10 WBC     Time reflects when diagnosis was documented in both MDM as applicable and the Disposition within this note     Time User Action Codes Description Comment    9/6/2019 12:59 AM Keith Alcala Add [R07 89] Chest wall pain     9/6/2019  1:00 AM Keith Alcala Add [R82 90] Abnormal urinalysis     9/6/2019  1:00 AM Keith Alcala Modify [R82 90] Abnormal urinalysis 4-10 WBC      ED Disposition     ED Disposition Condition Date/Time Comment    Discharge Stable Fri Sep 6, 2019 12:59 AM Va Enrique discharge to home/self care              Follow-up Information     Follow up With Specialties Details Why Contact Info Additional Information    [facility] Call in 2 days for further evaluation of symptoms [place] [phone]  [address] [place] [address] [facility] [place], [place], [phone]    Sven Canada MD Internal Medicine Call in 1 week for further evaluation of symptoms [address] [facility]       [address] [facility] Emergency Department Emergency Medicine Go to  As needed [address] [facility] [address] ED, [address] [place], [place], [address]          Discharge Medication List as of 9/6/2019  1:02 AM      START taking these medications    Details   cephalexin (KEFLEX) 500 mg capsule Take 1 capsule (500 mg total) by mouth every 12 (twelve) hours for 5 days, Starting Fri 9/6/2019, Until Wed 9/11/2019, Print      ibuprofen (MOTRIN) 400 mg tablet Take 1 tablet (400 mg total) by mouth every 6 (six) hours as needed for mild pain for up to 4 days, Starting Fri 9/6/2019, Until Tue 9/10/2019, Print         CONTINUE these medications which have NOT CHANGED    Details   clonazePAM (KlonoPIN) 0 5 mg tablet Take 1 tablet (0 5 mg total) by mouth 2 (two) times a day as needed for anxiety, Starting Sun 1/13/2019, Print      norgestimate-ethinyl estradiol (ORTHO TRI-CYCLEN LO) 0 18/0 215/0 25 MG-25 MCG per tablet Take 1 tablet by mouth daily, Historical Med           No discharge procedures on file      ED Provider  Electronically Signed by           Nile Powell PA-C  09/06/19 9215

## 2019-09-07 LAB — BACTERIA UR CULT: ABNORMAL

## 2019-11-24 ENCOUNTER — HOSPITAL ENCOUNTER (EMERGENCY)
Facility: HOSPITAL | Age: 24
Discharge: HOME | End: 2019-11-24
Attending: EMERGENCY MEDICINE | Admitting: EMERGENCY MEDICINE
Payer: COMMERCIAL

## 2019-11-24 ENCOUNTER — APPOINTMENT (EMERGENCY)
Dept: CT IMAGING | Facility: HOSPITAL | Age: 24
End: 2019-11-24
Payer: COMMERCIAL

## 2019-11-24 VITALS
OXYGEN SATURATION: 100 % | SYSTOLIC BLOOD PRESSURE: 120 MMHG | TEMPERATURE: 98.2 F | DIASTOLIC BLOOD PRESSURE: 72 MMHG | HEART RATE: 91 BPM | RESPIRATION RATE: 16 BRPM

## 2019-11-24 DIAGNOSIS — R51.9 HEADACHE: Primary | ICD-10-CM

## 2019-11-24 DIAGNOSIS — G44.209 TENSION HEADACHE: ICD-10-CM

## 2019-11-24 LAB
EXT PREG TEST URINE: NEGATIVE
EXT. CONTROL ED NAV: NORMAL

## 2019-11-24 PROCEDURE — 70450 CT HEAD/BRAIN W/O DYE: CPT

## 2019-11-24 PROCEDURE — 81025 URINE PREGNANCY TEST: CPT | Performed by: PHYSICIAN ASSISTANT

## 2019-11-24 PROCEDURE — 96374 THER/PROPH/DIAG INJ IV PUSH: CPT

## 2019-11-24 PROCEDURE — 99284 EMERGENCY DEPT VISIT MOD MDM: CPT

## 2019-11-24 PROCEDURE — 99285 EMERGENCY DEPT VISIT HI MDM: CPT | Performed by: PHYSICIAN ASSISTANT

## 2019-11-24 PROCEDURE — 96375 TX/PRO/DX INJ NEW DRUG ADDON: CPT

## 2019-11-24 RX ORDER — DIPHENHYDRAMINE HYDROCHLORIDE 50 MG/ML
25 INJECTION INTRAMUSCULAR; INTRAVENOUS ONCE
Status: COMPLETED | OUTPATIENT
Start: 2019-11-24 | End: 2019-11-24

## 2019-11-24 RX ORDER — ONDANSETRON 4 MG/1
4 TABLET, ORALLY DISINTEGRATING ORAL EVERY 6 HOURS PRN
Qty: 15 TABLET | Refills: 0 | Status: SHIPPED | OUTPATIENT
Start: 2019-11-24 | End: 2019-12-02

## 2019-11-24 RX ORDER — LORAZEPAM 1 MG/1
0.5 TABLET ORAL EVERY 8 HOURS PRN
Qty: 15 TABLET | Refills: 0 | Status: SHIPPED | OUTPATIENT
Start: 2019-11-24 | End: 2019-12-02

## 2019-11-24 RX ORDER — KETOROLAC TROMETHAMINE 30 MG/ML
30 INJECTION, SOLUTION INTRAMUSCULAR; INTRAVENOUS ONCE
Status: COMPLETED | OUTPATIENT
Start: 2019-11-24 | End: 2019-11-24

## 2019-11-24 RX ORDER — METOCLOPRAMIDE HYDROCHLORIDE 5 MG/ML
10 INJECTION INTRAMUSCULAR; INTRAVENOUS ONCE
Status: COMPLETED | OUTPATIENT
Start: 2019-11-24 | End: 2019-11-24

## 2019-11-24 RX ORDER — IBUPROFEN 400 MG/1
400 TABLET ORAL EVERY 6 HOURS PRN
Qty: 30 TABLET | Refills: 0 | Status: SHIPPED | OUTPATIENT
Start: 2019-11-24 | End: 2019-12-02

## 2019-11-24 RX ADMIN — DIPHENHYDRAMINE HYDROCHLORIDE 25 MG: 50 INJECTION, SOLUTION INTRAMUSCULAR; INTRAVENOUS at 19:57

## 2019-11-24 RX ADMIN — METOCLOPRAMIDE 10 MG: 5 INJECTION, SOLUTION INTRAMUSCULAR; INTRAVENOUS at 19:57

## 2019-11-24 RX ADMIN — KETOROLAC TROMETHAMINE 30 MG: 30 INJECTION, SOLUTION INTRAMUSCULAR at 19:56

## 2019-11-24 NOTE — ED PROVIDER NOTES
History  Chief Complaint   Patient presents with    Headache     pt co of headache for about 2 weeks, + dizzines, nausea     22 y o  female with past medical history significant for mitral valve prolapse, anxiety and depression presents to ED with chief complaint of headache  Onset of symptoms reported as 2 weeks ago  Location of symptoms reported as head  Quality is reported as tightness  Severity is reported as moderate  Associated symptoms: positive for nausea, denies vomiting, denies visual changes, denies unilateral upper or lower extremity weakness, paralysis, or paraesthesias, denies chest pain or sob  Positive for dizziness  Modifying factors: tried OTC medication at home without relief symptoms  Context: patient denies recent head injury or trauma  She reports 2 weeks of headache constantly, denies history of migraine headaches in the past   Associated with dizziness  Denies fevers  Reviewed past medical history and visits via EPIC:  Patient last seen in ed on 9/5/2019 for evaluation of chest wall pain          History provided by:  Patient and significant other   used: No        Prior to Admission Medications   Prescriptions Last Dose Informant Patient Reported? Taking?    clonazePAM (KlonoPIN) 0 5 mg tablet   No No   Sig: Take 1 tablet (0 5 mg total) by mouth 2 (two) times a day as needed for anxiety   Patient not taking: Reported on 11/25/2019   ibuprofen (MOTRIN) 400 mg tablet   No No   Sig: Take 1 tablet (400 mg total) by mouth every 6 (six) hours as needed for mild pain for up to 4 days   norgestimate-ethinyl estradiol (ORTHO TRI-CYCLEN LO) 0 18/0 215/0 25 MG-25 MCG per tablet   Yes No   Sig: Take 1 tablet by mouth daily      Facility-Administered Medications: None       Past Medical History:   Diagnosis Date    Known health problems: none     Mitral valve prolapse     Psychiatric disorder     anxiety, depression    Raynaud's disease        Past Surgical History: Procedure Laterality Date    VULVA SURGERY         History reviewed  No pertinent family history  I have reviewed and agree with the history as documented  Social History     Tobacco Use    Smoking status: Never Smoker    Smokeless tobacco: Never Used   Substance Use Topics    Alcohol use: Not Currently     Comment: "socially"    Drug use: Not Currently     Comment: hasn't used in a few months        Review of Systems   Constitutional: Negative for activity change, appetite change, chills, diaphoresis, fatigue, fever and unexpected weight change  HENT: Negative for congestion, dental problem, drooling, ear discharge, ear pain, facial swelling, hearing loss, mouth sores, nosebleeds, postnasal drip, rhinorrhea, sinus pressure, sinus pain, sneezing, sore throat, tinnitus, trouble swallowing and voice change  Eyes: Negative for photophobia, pain, discharge, redness, itching and visual disturbance  Respiratory: Negative for apnea, cough, choking, chest tightness, shortness of breath, wheezing and stridor  Cardiovascular: Negative for chest pain, palpitations and leg swelling  Gastrointestinal: Positive for nausea  Negative for abdominal distention, abdominal pain, anal bleeding, blood in stool, constipation, diarrhea, rectal pain and vomiting  Endocrine: Negative for cold intolerance, heat intolerance, polydipsia, polyphagia and polyuria  Genitourinary: Negative for decreased urine volume, difficulty urinating, dyspareunia, dysuria, enuresis, flank pain, frequency, hematuria, menstrual problem, pelvic pain, urgency, vaginal bleeding, vaginal discharge and vaginal pain  Musculoskeletal: Negative for arthralgias, back pain, gait problem, joint swelling, myalgias, neck pain and neck stiffness  Skin: Negative for color change, pallor, rash and wound  Allergic/Immunologic: Negative for environmental allergies, food allergies and immunocompromised state     Neurological: Positive for dizziness, light-headedness and headaches  Negative for tremors, seizures, syncope, facial asymmetry, speech difficulty, weakness and numbness  Hematological: Negative for adenopathy  Does not bruise/bleed easily  Psychiatric/Behavioral: Negative for agitation, confusion, hallucinations, self-injury and suicidal ideas  The patient is nervous/anxious  The patient is not hyperactive  All other systems reviewed and are negative  Physical Exam  Physical Exam   Constitutional: She is oriented to person, place, and time  She appears well-developed and well-nourished  No distress  /72   Pulse 91   Temp 98 2 °F (36 8 °C) (Oral)   Resp 16   LMP 10/24/2019   SpO2 100%      HENT:   Head: Normocephalic and atraumatic  Right Ear: External ear normal    Left Ear: External ear normal    Nose: Nose normal    Mouth/Throat: Oropharynx is clear and moist  No oropharyngeal exudate  Eyes: Conjunctivae and EOM are normal  Right eye exhibits no discharge  Left eye exhibits no discharge  No scleral icterus  Neck: Normal range of motion  Neck supple  No JVD present  No tracheal deviation present  Cardiovascular: Normal rate, regular rhythm and intact distal pulses  Pulmonary/Chest: Effort normal and breath sounds normal  No stridor  No respiratory distress  She has no wheezes  She has no rales  She exhibits no tenderness  Abdominal: Soft  She exhibits no distension and no mass  There is no tenderness  There is no rebound and no guarding  No hernia  Musculoskeletal: Normal range of motion  She exhibits no edema, tenderness or deformity  Lymphadenopathy:     She has no cervical adenopathy  Neurological: She is alert and oriented to person, place, and time  She displays normal reflexes  No cranial nerve deficit or sensory deficit  She exhibits normal muscle tone  Coordination normal    Skin: Skin is warm and dry  Capillary refill takes less than 2 seconds  No rash noted  She is not diaphoretic  No erythema   No pallor  Psychiatric: She has a normal mood and affect  Her behavior is normal  Judgment and thought content normal    Nursing note and vitals reviewed  Vital Signs  ED Triage Vitals [11/24/19 1857]   Temperature Pulse Respirations Blood Pressure SpO2   98 2 °F (36 8 °C) 91 16 120/72 100 %      Temp Source Heart Rate Source Patient Position - Orthostatic VS BP Location FiO2 (%)   Oral Monitor -- -- --      Pain Score       --           Vitals:    11/24/19 1857   BP: 120/72   Pulse: 91         Visual Acuity  Visual Acuity      Most Recent Value   L Pupil Size (mm)  3   R Pupil Size (mm)  3          ED Medications  Medications   metoclopramide (REGLAN) injection 10 mg (10 mg Intravenous Given 11/24/19 1957)   ketorolac (TORADOL) injection 30 mg (30 mg Intravenous Given 11/24/19 1956)   diphenhydrAMINE (BENADRYL) injection 25 mg (25 mg Intravenous Given 11/24/19 1957)       Diagnostic Studies  Results Reviewed     Procedure Component Value Units Date/Time    POCT pregnancy, urine [[id number]]  (Normal) Resulted:  11/24/19 2000    Lab Status:  Final result Updated:  11/24/19 2125     EXT PREG TEST UR (Ref: Negative) negative     Control   CT head without contrast   Final Result by Preston Aponte MD (11/24 2032)      No acute intracranial abnormality  Workstation performed: [id number]                    Procedures  Procedures       ED Course                               MDM  Number of Diagnoses or Management Options  Headache: new and requires workup  Tension headache: new and requires workup  Diagnosis management comments: ddx includes but is not limited to tension headache, migraine headache, cluster headache, sinusitis, SAH, SDH, doubt temporal arteritis due to lack of unilateral temporal location of pain, doubt intracranial hemorrhage, doubt meningitis due to lack of fever/nuchal rigidity  Lab results reviewed  Pregnancy test was negative    CT scan of the head images visualized by me  Radiology report reviewed:  No acute intracranial hemorrhage  ED course:  I discussed all test results with the patient at bedside  Patient's symptoms are improved after migraine cocktail given in the emergency department  Discussed with patient possibility that her symptoms might be related to tension headache or migraine headache however may also be related to anxiety  Discussed treatment plan including benzodiazepines, antiemetics, for suspected anxiety causing tension headache  Discussed outpatient follow up with primary care physician and Neurology for further evaluation of headache symptoms  No red flag symptoms such as sudden onset of worst headache of life or severe thunderclap style headache  I suspect symptoms are secondary to anxiety  Patient denies suicidal or homicidal ideations  Vitals are stable  Stable for discharge with outpatient follow-up  Reviewed reasons to return to ed  Patient verbalized understanding of diagnosis and agreement with discharge plan of care as well as understanding of reasons to return to ed  I have reasonably determine that electronically prescribing a controlled substance would be impractical for the patient to obtain the controlled substance prescribed by electronic prescription or would cause an untimely delay resulting in an adverse impact on the patient's medical condition              Amount and/or Complexity of Data Reviewed  Clinical lab tests: ordered and reviewed  Tests in the radiology section of CPT®: ordered and reviewed  Discussion of test results with the performing providers: yes  Obtain history from someone other than the patient: yes (Sig other)  Review and summarize past medical records: yes  Independent visualization of images, tracings, or specimens: yes    Patient Progress  Patient progress: stable      Disposition  Final diagnoses:   Headache   Tension headache     Time reflects when diagnosis was documented in both MDM as applicable and the Disposition within this note     Time User Action Codes Description Comment    11/24/2019  9:18 PM Arac[facility] Medicine Add [R51] Headache     11/24/2019  9:22 PM Arac[facility] Medicine Add [Q14 502] Tension headache       ED Disposition     ED Disposition Condition Date/Time Comment    Discharge Stable Gust[place] Nov 24, 2019  9:18 PM Brennan Victoria discharge to home/self care              Follow-up Information     Follow up With Specialties Details Why Contact Info Additional [address] [facility] Emergency Department Emergency Medicine Go to  If symptoms worsen [address] [facility] [id number] [phone] [place] ED, [address] [place], [place], [address] Ivy MD Family Medicine Call in 1 day for further evaluation of symptoms [address] [facility],[address]  [address]       Talya Bazzi MD Neurology Call in 1 day for further evaluation of symptoms [address] [facility]  [phone]             Discharge Medication List as of 11/24/2019  9:22 PM      START taking these medications    Details   LORazepam (ATIVAN) 1 mg tablet Take 0 5 tablets (0 5 mg total) by mouth every 8 (eight) hours as needed for anxiety for up to 15 doses, Starting Sun 11/24/2019, Print      ondansetron (ZOFRAN-ODT) 4 mg disintegrating tablet Take 1 tablet (4 mg total) by mouth every 6 (six) hours as needed for nausea or vomiting for up to 15 doses, Starting Sun 11/24/2019, Print         CONTINUE these medications which have CHANGED    Details   ibuprofen (MOTRIN) 400 mg tablet Take 1 tablet (400 mg total) by mouth every 6 (six) hours as needed for headaches for up to 30 doses, Starting Sun 11/24/2019, Print         CONTINUE these medications which have NOT CHANGED    Details   clonazePAM (KlonoPIN) 0 5 mg tablet Take 1 tablet (0 5 mg total) by mouth 2 (two) times a day as needed for anxiety, Starting Sun 1/13/2019, Print norgestimate-ethinyl estradiol (ORTHO TRI-CYCLEN LO) 0 18/0 215/0 25 MG-25 MCG per tablet Take 1 tablet by mouth daily, Historical Med           No discharge procedures on file      ED Provider  Electronically Signed by           Vinay Laboy PA-C  11/26/19 [id number]

## 2019-11-25 ENCOUNTER — HOSPITAL ENCOUNTER (EMERGENCY)
Facility: HOSPITAL | Age: 24
Discharge: HOME | End: 2019-11-26
Attending: EMERGENCY MEDICINE
Payer: COMMERCIAL

## 2019-11-25 DIAGNOSIS — R07.9 CHEST PAIN: Primary | ICD-10-CM

## 2019-11-25 LAB
ANION GAP SERPL CALCULATED.3IONS-SCNC: 5 MMOL/L (ref 4–13)
BASOPHILS # BLD AUTO: 0.02 THOUSANDS/ΜL (ref 0–0.1)
BASOPHILS NFR BLD AUTO: 0 % (ref 0–1)
BUN SERPL-MCNC: 12 MG/DL (ref 5–25)
CALCIUM SERPL-MCNC: 9.6 MG/DL (ref 8.3–10.1)
CHLORIDE SERPL-SCNC: 104 MMOL/L (ref 100–108)
CO2 SERPL-SCNC: 31 MMOL/L (ref 21–32)
CREAT SERPL-MCNC: 0.65 MG/DL (ref 0.6–1.3)
D DIMER PPP FEU-MCNC: 0.33 UG/ML FEU
EOSINOPHIL # BLD AUTO: 0.03 THOUSAND/ΜL (ref 0–0.61)
EOSINOPHIL NFR BLD AUTO: 0 % (ref 0–6)
ERYTHROCYTE [DISTWIDTH] IN BLOOD BY AUTOMATED COUNT: 12.6 % (ref 11.6–15.1)
EXT PREG TEST URINE: NEGATIVE
EXT. CONTROL ED NAV: NORMAL
GFR SERPL CREATININE-BSD FRML MDRD: 126 ML/MIN/1.73SQ M
GLUCOSE SERPL-MCNC: 118 MG/DL (ref 65–140)
HCG SERPL QL: NEGATIVE
HCT VFR BLD AUTO: 43.9 % (ref 34.8–46.1)
HGB BLD-MCNC: 14.8 G/DL (ref 11.5–15.4)
IMM GRANULOCYTES # BLD AUTO: 0.02 THOUSAND/UL (ref 0–0.2)
IMM GRANULOCYTES NFR BLD AUTO: 0 % (ref 0–2)
LYMPHOCYTES # BLD AUTO: 1.72 THOUSANDS/ΜL (ref 0.6–4.47)
LYMPHOCYTES NFR BLD AUTO: 25 % (ref 14–44)
MCH RBC QN AUTO: 32.3 PG (ref 26.8–34.3)
MCHC RBC AUTO-ENTMCNC: 33.7 G/DL (ref 31.4–37.4)
MCV RBC AUTO: 96 FL (ref 82–98)
MONOCYTES # BLD AUTO: 0.35 THOUSAND/ΜL (ref 0.17–1.22)
MONOCYTES NFR BLD AUTO: 5 % (ref 4–12)
NEUTROPHILS # BLD AUTO: 4.76 THOUSANDS/ΜL (ref 1.85–7.62)
NEUTS SEG NFR BLD AUTO: 70 % (ref 43–75)
NRBC BLD AUTO-RTO: 0 /100 WBCS
PLATELET # BLD AUTO: 244 THOUSANDS/UL (ref 149–390)
PMV BLD AUTO: 10.3 FL (ref 8.9–12.7)
POTASSIUM SERPL-SCNC: 4.1 MMOL/L (ref 3.5–5.3)
RBC # BLD AUTO: 4.58 MILLION/UL (ref 3.81–5.12)
SODIUM SERPL-SCNC: 140 MMOL/L (ref 136–145)
TROPONIN I SERPL-MCNC: <0.02 NG/ML
WBC # BLD AUTO: 6.9 THOUSAND/UL (ref 4.31–10.16)

## 2019-11-25 PROCEDURE — 99285 EMERGENCY DEPT VISIT HI MDM: CPT

## 2019-11-25 PROCEDURE — 80048 BASIC METABOLIC PNL TOTAL CA: CPT | Performed by: EMERGENCY MEDICINE

## 2019-11-25 PROCEDURE — 81025 URINE PREGNANCY TEST: CPT

## 2019-11-25 PROCEDURE — 84703 CHORIONIC GONADOTROPIN ASSAY: CPT | Performed by: EMERGENCY MEDICINE

## 2019-11-25 PROCEDURE — 93005 ELECTROCARDIOGRAM TRACING: CPT

## 2019-11-25 PROCEDURE — 36415 COLL VENOUS BLD VENIPUNCTURE: CPT | Performed by: EMERGENCY MEDICINE

## 2019-11-25 PROCEDURE — 85025 COMPLETE CBC W/AUTO DIFF WBC: CPT | Performed by: EMERGENCY MEDICINE

## 2019-11-25 PROCEDURE — 85379 FIBRIN DEGRADATION QUANT: CPT | Performed by: EMERGENCY MEDICINE

## 2019-11-25 PROCEDURE — 84484 ASSAY OF TROPONIN QUANT: CPT | Performed by: EMERGENCY MEDICINE

## 2019-11-25 PROCEDURE — 99285 EMERGENCY DEPT VISIT HI MDM: CPT | Performed by: EMERGENCY MEDICINE

## 2019-11-25 RX ORDER — SODIUM CHLORIDE 9 MG/ML
3 INJECTION INTRAVENOUS AS NEEDED
Status: DISCONTINUED | OUTPATIENT
Start: 2019-11-25 | End: 2019-11-26 | Stop reason: HOSPADM

## 2019-11-26 ENCOUNTER — APPOINTMENT (EMERGENCY)
Dept: RADIOLOGY | Facility: HOSPITAL | Age: 24
End: 2019-11-26
Payer: COMMERCIAL

## 2019-11-26 ENCOUNTER — APPOINTMENT (EMERGENCY)
Dept: CT IMAGING | Facility: HOSPITAL | Age: 24
End: 2019-11-26
Payer: COMMERCIAL

## 2019-11-26 ENCOUNTER — APPOINTMENT (OUTPATIENT)
Dept: NON INVASIVE DIAGNOSTICS | Facility: HOSPITAL | Age: 24
End: 2019-11-26
Payer: COMMERCIAL

## 2019-11-26 ENCOUNTER — HOSPITAL ENCOUNTER (OUTPATIENT)
Facility: HOSPITAL | Age: 24
Setting detail: OBSERVATION
Discharge: HOME | End: 2019-11-27
Attending: EMERGENCY MEDICINE | Admitting: INTERNAL MEDICINE
Payer: COMMERCIAL

## 2019-11-26 VITALS
TEMPERATURE: 98.7 F | BODY MASS INDEX: 19.24 KG/M2 | OXYGEN SATURATION: 100 % | SYSTOLIC BLOOD PRESSURE: 120 MMHG | RESPIRATION RATE: 20 BRPM | DIASTOLIC BLOOD PRESSURE: 69 MMHG | WEIGHT: 101.85 LBS | HEART RATE: 77 BPM

## 2019-11-26 DIAGNOSIS — R94.31 NONSPECIFIC ABNORMAL ELECTROCARDIOGRAM (ECG) (EKG): ICD-10-CM

## 2019-11-26 DIAGNOSIS — R94.31 ABNORMAL EKG: Primary | ICD-10-CM

## 2019-11-26 DIAGNOSIS — R00.0 SINUS TACHYCARDIA: ICD-10-CM

## 2019-11-26 DIAGNOSIS — R00.2 PALPITATIONS: ICD-10-CM

## 2019-11-26 DIAGNOSIS — F41.9 ANXIETY: ICD-10-CM

## 2019-11-26 LAB
ALBUMIN SERPL BCP-MCNC: 4 G/DL (ref 3.5–5)
ALP SERPL-CCNC: 48 U/L (ref 46–116)
ALT SERPL W P-5'-P-CCNC: 21 U/L (ref 12–78)
AMPHETAMINES SERPL QL SCN: NEGATIVE
ANION GAP SERPL CALCULATED.3IONS-SCNC: 15 MMOL/L (ref 4–13)
APTT PPP: 24 SECONDS (ref 23–37)
ARRHY DURING EX: NORMAL
AST SERPL W P-5'-P-CCNC: 14 U/L (ref 5–45)
ATRIAL RATE: 100 BPM
ATRIAL RATE: 115 BPM
ATRIAL RATE: 74 BPM
ATRIAL RATE: 86 BPM
BACTERIA UR QL AUTO: ABNORMAL /HPF
BARBITURATES UR QL: NEGATIVE
BASOPHILS # BLD AUTO: 0.02 THOUSANDS/ΜL (ref 0–0.1)
BASOPHILS NFR BLD AUTO: 0 % (ref 0–1)
BENZODIAZ UR QL: NEGATIVE
BILIRUB DIRECT SERPL-MCNC: 0.08 MG/DL (ref 0–0.2)
BILIRUB SERPL-MCNC: 0.4 MG/DL (ref 0.2–1)
BILIRUB UR QL STRIP: NEGATIVE
BUN SERPL-MCNC: 8 MG/DL (ref 5–25)
CALCIUM SERPL-MCNC: 10 MG/DL (ref 8.3–10.1)
CHEST PAIN STATEMENT: NORMAL
CHLORIDE SERPL-SCNC: 103 MMOL/L (ref 100–108)
CLARITY UR: CLEAR
CO2 SERPL-SCNC: 24 MMOL/L (ref 21–32)
COCAINE UR QL: NEGATIVE
COLOR UR: YELLOW
CREAT SERPL-MCNC: 0.74 MG/DL (ref 0.6–1.3)
EOSINOPHIL # BLD AUTO: 0.02 THOUSAND/ΜL (ref 0–0.61)
EOSINOPHIL NFR BLD AUTO: 0 % (ref 0–6)
ERYTHROCYTE [DISTWIDTH] IN BLOOD BY AUTOMATED COUNT: 12.3 % (ref 11.6–15.1)
EXT PREG TEST URINE: NEGATIVE
EXT. CONTROL ED NAV: NORMAL
GFR SERPL CREATININE-BSD FRML MDRD: 115 ML/MIN/1.73SQ M
GLUCOSE SERPL-MCNC: 107 MG/DL (ref 65–140)
GLUCOSE UR STRIP-MCNC: NEGATIVE MG/DL
HCT VFR BLD AUTO: 44.9 % (ref 34.8–46.1)
HGB BLD-MCNC: 15.9 G/DL (ref 11.5–15.4)
HGB UR QL STRIP.AUTO: NEGATIVE
IMM GRANULOCYTES # BLD AUTO: 0.03 THOUSAND/UL (ref 0–0.2)
IMM GRANULOCYTES NFR BLD AUTO: 0 % (ref 0–2)
INR PPP: 1.04 (ref 0.84–1.19)
KETONES UR STRIP-MCNC: NEGATIVE MG/DL
LEUKOCYTE ESTERASE UR QL STRIP: ABNORMAL
LIPASE SERPL-CCNC: 97 U/L (ref 73–393)
LYMPHOCYTES # BLD AUTO: 3 THOUSANDS/ΜL (ref 0.6–4.47)
LYMPHOCYTES NFR BLD AUTO: 31 % (ref 14–44)
MAGNESIUM SERPL-MCNC: 1.6 MG/DL (ref 1.6–2.6)
MAX DIASTOLIC BP: 78 MMHG
MAX HEART RATE: 179 BPM
MAX PREDICTED HEART RATE: 197 BPM
MAX. SYSTOLIC BP: 132 MMHG
MCH RBC QN AUTO: 32.9 PG (ref 26.8–34.3)
MCHC RBC AUTO-ENTMCNC: 35.4 G/DL (ref 31.4–37.4)
MCV RBC AUTO: 93 FL (ref 82–98)
METHADONE UR QL: NEGATIVE
MONOCYTES # BLD AUTO: 0.56 THOUSAND/ΜL (ref 0.17–1.22)
MONOCYTES NFR BLD AUTO: 6 % (ref 4–12)
NEUTROPHILS # BLD AUTO: 6.06 THOUSANDS/ΜL (ref 1.85–7.62)
NEUTS SEG NFR BLD AUTO: 63 % (ref 43–75)
NITRITE UR QL STRIP: NEGATIVE
NON-SQ EPI CELLS URNS QL MICRO: ABNORMAL /HPF
NRBC BLD AUTO-RTO: 0 /100 WBCS
OPIATES UR QL SCN: NEGATIVE
P AXIS: 27 DEGREES
P AXIS: 59 DEGREES
P AXIS: 64 DEGREES
P AXIS: 67 DEGREES
PCP UR QL: NEGATIVE
PH UR STRIP.AUTO: 6.5 [PH]
PLATELET # BLD AUTO: 274 THOUSANDS/UL (ref 149–390)
PMV BLD AUTO: 10.5 FL (ref 8.9–12.7)
POTASSIUM SERPL-SCNC: 3.3 MMOL/L (ref 3.5–5.3)
PR INTERVAL: 110 MS
PR INTERVAL: 110 MS
PR INTERVAL: 120 MS
PR INTERVAL: 126 MS
PROT SERPL-MCNC: 8.3 G/DL (ref 6.4–8.2)
PROT UR STRIP-MCNC: NEGATIVE MG/DL
PROTHROMBIN TIME: 13.6 SECONDS (ref 11.6–14.5)
PROTOCOL NAME: NORMAL
QRS AXIS: 74 DEGREES
QRS AXIS: 83 DEGREES
QRS AXIS: 83 DEGREES
QRS AXIS: 86 DEGREES
QRSD INTERVAL: 78 MS
QRSD INTERVAL: 78 MS
QRSD INTERVAL: 82 MS
QRSD INTERVAL: 84 MS
QT INTERVAL: 328 MS
QT INTERVAL: 340 MS
QT INTERVAL: 362 MS
QT INTERVAL: 388 MS
QTC INTERVAL: 401 MS
QTC INTERVAL: 438 MS
QTC INTERVAL: 453 MS
QTC INTERVAL: 464 MS
RBC # BLD AUTO: 4.83 MILLION/UL (ref 3.81–5.12)
RBC #/AREA URNS AUTO: ABNORMAL /HPF
REASON FOR TERMINATION: NORMAL
SODIUM SERPL-SCNC: 142 MMOL/L (ref 136–145)
SP GR UR STRIP.AUTO: <=1.005 (ref 1–1.03)
T WAVE AXIS: -10 DEGREES
T WAVE AXIS: -3 DEGREES
T WAVE AXIS: -59 DEGREES
T WAVE AXIS: 40 DEGREES
TARGET HR FORMULA: NORMAL
TEST INDICATION: NORMAL
THC UR QL: NEGATIVE
TIME IN EXERCISE PHASE: NORMAL
TROPONIN I SERPL-MCNC: <0.02 NG/ML
TROPONIN I SERPL-MCNC: <0.02 NG/ML
TSH SERPL DL<=0.05 MIU/L-ACNC: 2.81 UIU/ML (ref 0.36–3.74)
UROBILINOGEN UR QL STRIP.AUTO: 0.2 E.U./DL
VENTRICULAR RATE: 100 BPM
VENTRICULAR RATE: 115 BPM
VENTRICULAR RATE: 74 BPM
VENTRICULAR RATE: 86 BPM
WBC # BLD AUTO: 9.69 THOUSAND/UL (ref 4.31–10.16)
WBC #/AREA URNS AUTO: ABNORMAL /HPF

## 2019-11-26 PROCEDURE — 85730 THROMBOPLASTIN TIME PARTIAL: CPT | Performed by: EMERGENCY MEDICINE

## 2019-11-26 PROCEDURE — 85025 COMPLETE CBC W/AUTO DIFF WBC: CPT | Performed by: EMERGENCY MEDICINE

## 2019-11-26 PROCEDURE — 80076 HEPATIC FUNCTION PANEL: CPT | Performed by: EMERGENCY MEDICINE

## 2019-11-26 PROCEDURE — 99243 OFF/OP CNSLTJ NEW/EST LOW 30: CPT | Performed by: INTERNAL MEDICINE

## 2019-11-26 PROCEDURE — 80048 BASIC METABOLIC PNL TOTAL CA: CPT | Performed by: EMERGENCY MEDICINE

## 2019-11-26 PROCEDURE — 93350 STRESS TTE ONLY: CPT

## 2019-11-26 PROCEDURE — 84443 ASSAY THYROID STIM HORMONE: CPT | Performed by: EMERGENCY MEDICINE

## 2019-11-26 PROCEDURE — 93005 ELECTROCARDIOGRAM TRACING: CPT

## 2019-11-26 PROCEDURE — 71046 X-RAY EXAM CHEST 2 VIEWS: CPT

## 2019-11-26 PROCEDURE — 83735 ASSAY OF MAGNESIUM: CPT | Performed by: EMERGENCY MEDICINE

## 2019-11-26 PROCEDURE — 83690 ASSAY OF LIPASE: CPT | Performed by: EMERGENCY MEDICINE

## 2019-11-26 PROCEDURE — 36415 COLL VENOUS BLD VENIPUNCTURE: CPT | Performed by: EMERGENCY MEDICINE

## 2019-11-26 PROCEDURE — 80307 DRUG TEST PRSMV CHEM ANLYZR: CPT | Performed by: EMERGENCY MEDICINE

## 2019-11-26 PROCEDURE — 85610 PROTHROMBIN TIME: CPT | Performed by: EMERGENCY MEDICINE

## 2019-11-26 PROCEDURE — 93351 STRESS TTE COMPLETE: CPT | Performed by: INTERNAL MEDICINE

## 2019-11-26 PROCEDURE — 99285 EMERGENCY DEPT VISIT HI MDM: CPT | Performed by: EMERGENCY MEDICINE

## 2019-11-26 PROCEDURE — 96360 HYDRATION IV INFUSION INIT: CPT

## 2019-11-26 PROCEDURE — 93010 ELECTROCARDIOGRAM REPORT: CPT | Performed by: INTERNAL MEDICINE

## 2019-11-26 PROCEDURE — 84484 ASSAY OF TROPONIN QUANT: CPT | Performed by: EMERGENCY MEDICINE

## 2019-11-26 PROCEDURE — 99220 PR INITIAL OBSERVATION CARE/DAY 70 MINUTES: CPT | Performed by: INTERNAL MEDICINE

## 2019-11-26 PROCEDURE — 81025 URINE PREGNANCY TEST: CPT | Performed by: EMERGENCY MEDICINE

## 2019-11-26 PROCEDURE — 71275 CT ANGIOGRAPHY CHEST: CPT

## 2019-11-26 PROCEDURE — 81001 URINALYSIS AUTO W/SCOPE: CPT | Performed by: EMERGENCY MEDICINE

## 2019-11-26 PROCEDURE — 99285 EMERGENCY DEPT VISIT HI MDM: CPT

## 2019-11-26 PROCEDURE — 84484 ASSAY OF TROPONIN QUANT: CPT | Performed by: INTERNAL MEDICINE

## 2019-11-26 RX ORDER — POTASSIUM CHLORIDE 20 MEQ/1
40 TABLET, EXTENDED RELEASE ORAL ONCE
Status: COMPLETED | OUTPATIENT
Start: 2019-11-26 | End: 2019-11-26

## 2019-11-26 RX ORDER — METOPROLOL TARTRATE 5 MG/5ML
5 INJECTION INTRAVENOUS ONCE
Status: COMPLETED | OUTPATIENT
Start: 2019-11-26 | End: 2019-11-26

## 2019-11-26 RX ORDER — ONDANSETRON 2 MG/ML
4 INJECTION INTRAMUSCULAR; INTRAVENOUS EVERY 6 HOURS PRN
Status: DISCONTINUED | OUTPATIENT
Start: 2019-11-26 | End: 2019-11-27 | Stop reason: HOSPADM

## 2019-11-26 RX ORDER — KETOROLAC TROMETHAMINE 30 MG/ML
15 INJECTION, SOLUTION INTRAMUSCULAR; INTRAVENOUS ONCE
Status: DISCONTINUED | OUTPATIENT
Start: 2019-11-26 | End: 2019-11-27 | Stop reason: HOSPADM

## 2019-11-26 RX ORDER — HYDROXYZINE HYDROCHLORIDE 25 MG/1
25 TABLET, FILM COATED ORAL ONCE
Status: COMPLETED | OUTPATIENT
Start: 2019-11-26 | End: 2019-11-26

## 2019-11-26 RX ORDER — CLONAZEPAM 0.5 MG/1
0.5 TABLET ORAL 2 TIMES DAILY PRN
Status: DISCONTINUED | OUTPATIENT
Start: 2019-11-26 | End: 2019-11-27 | Stop reason: HOSPADM

## 2019-11-26 RX ORDER — SODIUM CHLORIDE 9 MG/ML
75 INJECTION, SOLUTION INTRAVENOUS CONTINUOUS
Status: DISCONTINUED | OUTPATIENT
Start: 2019-11-26 | End: 2019-11-27

## 2019-11-26 RX ADMIN — IOHEXOL 85 ML: 350 INJECTION, SOLUTION INTRAVENOUS at 09:38

## 2019-11-26 RX ADMIN — SODIUM CHLORIDE 1000 ML: 0.9 INJECTION, SOLUTION INTRAVENOUS at 09:19

## 2019-11-26 RX ADMIN — METOPROLOL TARTRATE 5 MG: 5 INJECTION INTRAVENOUS at 18:48

## 2019-11-26 RX ADMIN — HYDROXYZINE HYDROCHLORIDE 25 MG: 25 TABLET ORAL at 09:21

## 2019-11-26 RX ADMIN — SODIUM CHLORIDE 75 ML/HR: 0.9 INJECTION, SOLUTION INTRAVENOUS at 18:48

## 2019-11-26 RX ADMIN — POTASSIUM CHLORIDE 40 MEQ: 1500 TABLET, EXTENDED RELEASE ORAL at 10:33

## 2019-11-26 RX ADMIN — SODIUM CHLORIDE 75 ML/HR: 0.9 INJECTION, SOLUTION INTRAVENOUS at 12:55

## 2019-11-26 RX ADMIN — CLONAZEPAM 0.5 MG: 0.5 TABLET ORAL at 18:05

## 2019-11-26 NOTE — ED PROVIDER NOTES
History  Chief Complaint   Patient presents with    Chest Pain     pt c/o chest pain that comes in waves that has worsened over the past 3 weeks  pt c/o fluctuating blood pressure and chest pain; pt has hx of anxiety but states does not feel like an attack  pt was seen here last night for same complaint     HPI  22 y o  female presents with months of substernal chest pain without radiation  Patient describes moderately sharp that waxes and wanes and worsened tonight, prompting her to come to the ER for further evaluation and treatment  Patient notes the pain has mostly resolved since arrival to the emergency room  Patient states nothing clearly worsens the pain and nothing improves the pain  Patient denies pleuritic component to chest pain  Patient denies exertional component to the chest pain  Patient states that tonight she felt increasingly fatigued and diaphoretic and subsequently took her blood pressure, noting it was elevated  She subsequently had her chest pain that progressed then worsened, prompting her to come to the emergency room for further evaluation and treatment  Patient has been seen multiple times including high multiple primary care physicians, cardiology, and multiple emergency department visits without clear etiology identified  Patient notes frustration with identifying the etiology stating the frequency of her symptoms have been increasing  Patient has follow-up with her primary care later this week  Patient associates intermittent episodes of diaphoresis that have been ongoing for weeks along with nausea without vomiting tonight  and denies fever/chills,  dyspnea, cough, hemoptysis, weakness (though she notes occasional fatigue), back pain, syncope, focal weakness or numbness, leg pain or swelling  All other review of systems reviewed and noted to be negative  The patient denies a history of atherosclerotic disease (CAD/TIA/CVA/PAD)      Patient denies any history of hypertension  Patient denies hyperlipidemia  Patient denies diabetes  Patient denies obesity  Patient denies any early family history of CAD (male less than 51yo or female less than 73 yo)  Patient denies any use of tobacco in the past 90 days  The patient denies any use of illicit drugs, including cocaine  Patient denies any immobilization of at least 3 days or surgery in the past 4 weeks  Patient denies any history of DVT or PE  Patient denies any history or family history of thrombophilia  Patient denies any malignancy with treatment within the past 6 months  Patient affirms any use of exogenous estrogen containing compounds  Focused Objective  CV:  Normal inspection with no rash, signs of infection, or trauma  Borderline tachycardia rate and regular rhythm  No murmur  Peripheral pulses intact and equal   Nontender to palpitation over area of patient's reported pain  Respiratory:  Lungs clear to auscultation bilaterally without adventitious sounds  Skin:  Warm and dry  No rash or signs of herpes zoster over area of patient's reported pain  Extremities:  Non-tender lower extremities without asymmetry; no clinical signs of DVT  No lower extremity edema  Medical Decision Making    Patient presenting with chest pain with a broad differential, including multiple emergent etiologies  Patient's age is diaphoresis and nausea but no vomiting  Patient's symptoms have been ongoing for some time with evaluation by multiple specialists and emergency department visits without etiology identified  I emphasized to the patient the need for continued follow-up and possibility of diagnostic uncertainty follow our evaluation today however the need for continued evaluation by primary care and subspecialty is  EKG obtained and reviewed independently by myself, which was interpreted by myself as sinus rhythm with sinus arrhythmia with a borderline [place] of 110  There is no apparent delta wave  EKG with improved T-waves from prior EKG in September  Patient placed on cardiac monitoring  Regarding the possibility for ACS, patient's risk stratification by the HEART SCORE:    HEART score:    History 0=Slightly or non-suspicious  ECG 1=Nonspecific repolarization disturbance  Age 0= < 45 years  Risk Factors 0= No risk factors known  Troponin 0= < Normal limit  Total 1      Score 0-3: 1 7% had a MACE risk  0 4% (1 patient)   36 4% of patients were in this low risk group    Score 4-6: 16 6% had a MACE risk    Score 7-10: 50 1% had a MACE risk       The patient's HEART score is 1  CXR will be obtained to evaluate for alternative pathologies, including pneumothorax or pneumonia  Laboratory analysis including troponin to evaluate for ACS, CBC to evaluate for anemia or leukocytosis, and BMP to evaluate renal function and electrolytes considering possibility of cardiac involvement  Patient has symptoms and history concerning for possible pulmonary embolism  Regarding this etiology, patient's risk stratification by the Wells' Criteria for Pulmonary Embolism (Two Tier Model):  no - clinical signs or symptoms of DVT (3)  no - PE most likely diagnosis (3)  yes - Heart rate greater than 100 (1 5)  no - Immobilization at least 3 days OR surgery in the previous 4 weeks (1 5)  no - Previous, objectively diagnosed PE or DVT (1 5)  no - Hemoptysis (1)  no - Malignancy with treatment within 6 months or palliative (1)    Patient's risk further evaluated by the [facility] Criteria for Pulmonary Embolism:  no - age is greater than or equal to 50  yes - Heart rate greater than 100  no - O2 sat on room air < 95%  no - Prior history of PE or DVT  no - Recent trauma or surgery  no - Hemoptysis  yes - Use of exogenous estrogen  no - Unilateral leg swelling    Patient has a low risk Wells' criteria but is unable to be cleared via the [facility] criteria    As such, a D-Dimer will be ordered for the patient to evaluate for the potential for pulmonary embolism  If positive, CT imaging of the patient's chest will be obtained to evaluate for potential pulmonary embolism  The patient's HEART score Rizwan Quincy 2008) indicates a lower risk regarding the possibility of ACS  Reassessment:  Patient initial evaluation is unremarkable for cardiac findings  Patient's symptoms have resolved, no chest pain at present  I discussed potential etiologies the patient offered for patient to stay for delta troponin versus close follow-up with existing resources  Patient prefers to follow up with existing resources and to return to emergency room with any worsening or change in her symptoms  I provided her with additional resources for primary care and encouraged her to continue follow up with him to identify the source of her potential issues  History provided by:  Patient  Chest Pain   Pain location:  Substernal area  Pain quality: sharp    Pain radiates to:  Does not radiate  Pain radiates to the back: no    Relieved by:  Nothing  Worsened by:  Nothing tried  Associated symptoms: anxiety, diaphoresis and nausea    Associated symptoms: no abdominal pain, no altered mental status, no anorexia, no back pain, no claudication, no cough, no dizziness, no dysphagia, no fatigue, no fever, no headache, no heartburn, no lower extremity edema, no near-syncope, no numbness, no orthopnea, no palpitations, no PND, no shortness of breath, no syncope, not vomiting and no weakness        Prior to Admission Medications   Prescriptions Last Dose Informant Patient Reported? Taking?    LORazepam (ATIVAN) 1 mg tablet Not Taking at Unknown time  No No   Sig: Take 0 5 tablets (0 5 mg total) by mouth every 8 (eight) hours as needed for anxiety for up to 15 doses   Patient not taking: Reported on 11/25/2019   clonazePAM (KlonoPIN) 0 5 mg tablet Not Taking at Unknown time  No No   Sig: Take 1 tablet (0 5 mg total) by mouth 2 (two) times a day as needed for anxiety   Patient not taking: Reported on 11/25/2019   ibuprofen (MOTRIN) 400 mg tablet   No No   Sig: Take 1 tablet (400 mg total) by mouth every 6 (six) hours as needed for mild pain for up to 4 days   ibuprofen (MOTRIN) 400 mg tablet Not Taking at Unknown time  No No   Sig: Take 1 tablet (400 mg total) by mouth every 6 (six) hours as needed for headaches for up to 30 doses   Patient not taking: Reported on 11/25/2019   norgestimate-ethinyl estradiol (ORTHO TRI-CYCLEN LO) 0 18/0 215/0 25 MG-25 MCG per tablet   Yes No   Sig: Take 1 tablet by mouth daily   ondansetron (ZOFRAN-ODT) 4 mg disintegrating tablet Not Taking at Unknown time  No No   Sig: Take 1 tablet (4 mg total) by mouth every 6 (six) hours as needed for nausea or vomiting for up to 15 doses   Patient not taking: Reported on 11/25/2019      Facility-Administered Medications: None       Past Medical History:   Diagnosis Date    Known health problems: none     Mitral valve prolapse     Psychiatric disorder     anxiety, depression    Raynaud's disease        Past Surgical History:   Procedure Laterality Date    VULVA SURGERY         History reviewed  No pertinent family history  I have reviewed and agree with the history as documented  Social History     Tobacco Use    Smoking status: Never Smoker    Smokeless tobacco: Never Used   Substance Use Topics    Alcohol use: Not Currently     Comment: "socially"    Drug use: Not Currently     Comment: hasn't used in a few months        Review of Systems   Constitutional: Positive for diaphoresis  Negative for fatigue and fever  HENT: Negative for trouble swallowing  Respiratory: Negative for cough and shortness of breath  Cardiovascular: Positive for chest pain  Negative for palpitations, orthopnea, claudication, syncope, PND and near-syncope  Gastrointestinal: Positive for nausea  Negative for abdominal pain, anorexia, heartburn and vomiting  Musculoskeletal: Negative for back pain     Neurological: Negative for dizziness, weakness, numbness and headaches  All other systems reviewed and are negative  Physical Exam  Physical Exam   Constitutional: She appears well-developed and well-nourished  HENT:   Head: Normocephalic and atraumatic  Mouth/Throat: Oropharynx is clear and moist    Eyes: Pupils are equal, round, and reactive to light  Neck: Normal range of motion  Neck supple  Cardiovascular: Normal rate and regular rhythm  Pulmonary/Chest: Effort normal and breath sounds normal  She has no decreased breath sounds  Abdominal: Soft  She exhibits no distension  There is no tenderness  There is no rebound and no guarding  Musculoskeletal:        Right lower leg: Normal  She exhibits no tenderness and no edema  Left lower leg: Normal  She exhibits no tenderness and no edema  Neurological: She is alert  Skin: Skin is warm and dry  Psychiatric: She has a normal mood and affect  Vitals reviewed        Vital Signs  ED Triage Vitals [11/25/19 2246]   Temperature Pulse Respirations Blood Pressure SpO2   98 7 °F (37 1 °C) 104 20 125/81 97 %      Temp Source Heart Rate Source Patient Position - Orthostatic VS BP Location FiO2 (%)   Oral Monitor -- Right arm --      Pain Score       No Pain           Vitals:    11/25/19 2246 11/26/19 0000   BP: 125/81 120/69   Pulse: 104 77         Visual Acuity      ED Medications  Medications - No data to display    Diagnostic Studies  Results Reviewed     Procedure Component Value Units Date/Time    POCT pregnancy, urine [[id number]]  (Normal) Resulted:  11/25/19 2356    Lab Status:  Final result Updated:  11/25/19 2356     EXT PREG TEST UR (Ref: Negative) negative     Control valid    Basic metabolic panel [[id number]] Collected:  11/25/19 2327    Lab Status:  Final result Specimen:  Blood from Arm, Left Updated:  11/25/19 2355     Sodium 140 mmol/L      Potassium 4 1 mmol/L      Chloride 104 mmol/L      CO2 31 mmol/L      ANION GAP 5 mmol/L      BUN 12 mg/dL Creatinine 0 65 mg/dL      Glucose 118 mg/dL      Calcium 9 6 mg/dL      eGFR 126 ml/min/1 73sq m     Narrative:       [place] guidelines for Chronic Kidney Disease (CKD):     Stage 1 with normal or high GFR (GFR > 90 mL/min/1 73 square meters)    Stage 2 Mild CKD (GFR = 60-89 mL/min/1 73 square meters)    Stage 3A Moderate CKD (GFR = 45-59 mL/min/1 73 square meters)    Stage 3B Moderate CKD (GFR = 30-44 mL/min/1 73 square meters)    Stage 4 Severe CKD (GFR = 15-29 mL/min/1 73 square meters)    Stage 5 End Stage CKD (GFR <15 mL/min/1 73 square meters)  Note: GFR calculation is accurate only with a steady state creatinine    hCG, qualitative pregnancy [[id number]]  (Normal) Collected:  11/25/19 2327    Lab Status:  Final result Specimen:  Blood from Arm, Left Updated:  11/25/19 2355     Preg, Serum Negative    Troponin I [[id number]]  (Normal) Collected:  11/25/19 2327    Lab Status:  Final result Specimen:  Blood from Arm, Left Updated:  11/25/19 2353     Troponin I <0 02 ng/mL     D-dimer, quantitative [[id number]]  (Normal) Collected:  11/25/19 2327    Lab Status:  Final result Specimen:  Blood from Arm, Left Updated:  11/25/19 2346     D-Dimer, Quant 0 33 ug/ml FEU     CBC and differential [[id number]] Collected:  11/25/19 2327    Lab Status:  Final result Specimen:  Blood from Arm, Left Updated:  11/25/19 2334     WBC 6 90 Thousand/uL      RBC 4 58 Million/uL      Hemoglobin 14 8 g/dL      Hematocrit 43 9 %      MCV 96 fL      MCH 32 3 pg      MCHC 33 7 g/dL      RDW 12 6 %      MPV 10 3 fL      Platelets 529 Thousands/uL      nRBC 0 /100 WBCs      Neutrophils Relative 70 %      Immat GRANS % 0 %      Lymphocytes Relative 25 %      Monocytes Relative 5 %      Eosinophils Relative 0 %      Basophils Relative 0 %      Neutrophils Absolute 4 76 Thousands/µL      Immature Grans Absolute 0 02 Thousand/uL      Lymphocytes Absolute 1 72 Thousands/µL      Monocytes Absolute 0 35 Thousand/µL Eosinophils Absolute 0 03 Thousand/µL      Basophils Absolute 0 02 Thousands/µL                  X-ray chest 2 views   ED Interpretation by Tahmina Carson MD (11/26 0036)   No acute findings  Procedures  Procedures       ED Course                               MDM    Disposition  Final diagnoses:   Chest pain     Time reflects when diagnosis was documented in both MDM as applicable and the Disposition within this note     Time User Action Codes Description Comment    11/26/2019 12:36 AM Martin Feliciano Add [R07 9] Chest pain       ED Disposition     ED Disposition Condition Date/Time Comment    Discharge Stable Tue Nov 26, 2019 12:36 AM Mirella James discharge to home/self care  Follow-up Information     Follow up With Specialties Details Why Contact Info Additional Information    Infolink  Schedule an appointment as soon as possible for a visit  Call and schedule follow-up with your primary care physician or contact this number to schedule appointment with alternative family physician   [address] [facility] Emergency Department Emergency Medicine Go to  If symptoms worsen [address] [facility] [id number] [phone] [place] ED, [address] [place] [address], [facility]          Discharge Medication List as of 11/26/2019 12:37 AM      CONTINUE these medications which have NOT CHANGED    Details   clonazePAM (KlonoPIN) 0 5 mg tablet Take 1 tablet (0 5 mg total) by mouth 2 (two) times a day as needed for anxiety, Starting Sun 1/13/2019, Print      ibuprofen (MOTRIN) 400 mg tablet Take 1 tablet (400 mg total) by mouth every 6 (six) hours as needed for headaches for up to 30 doses, Starting Sun 11/24/2019, Print      LORazepam (ATIVAN) 1 mg tablet Take 0 5 tablets (0 5 mg total) by mouth every 8 (eight) hours as needed for anxiety for up to 15 doses, Starting Sun 11/24/2019, Print      norgestimate-ethinyl estradiol (ORTHO TRI-CYCLEN LO) 0 18/0 215/0 25 MG-25 MCG per tablet Take 1 tablet by mouth daily, Historical Med      ondansetron (ZOFRAN-ODT) 4 mg disintegrating tablet Take 1 tablet (4 mg total) by mouth every 6 (six) hours as needed for nausea or vomiting for up to 15 doses, Starting Sun 11/24/2019, Print           No discharge procedures on file      ED Provider  Electronically Signed by           Ceferino Chang MD  11/26/19 2426

## 2019-11-26 NOTE — ED NOTES
1  CC-Rapid heart rate    2  Is this admission due to an injury?-no    3  Orientation status A&Ox3    4  Abnormal labs/ focused assessment/ vitals-none    5  Medications/ drips [email or web address]    6  Narcotic time/ pain medications-none    7  IV lines/drains/ etc 20G RT AC    8  Isolation status-none    9  Skin-intact    10  Ambulation status-independant    11   ED phone number [phone]     Ben Pineda RN  11/26/19 2267

## 2019-11-26 NOTE — ASSESSMENT & PLAN NOTE
27-year-old female with history of mitral valve prolapse, anxiety, depression, Raynaud's disease presenting with 2nd episode in the ED for symptoms of palpitations, dyspnea and lightheadedness      -CT chest negative for pulmonary embolism, chest x-ray negative  -prior history of snorting cocaine however has not done this in the past year, UDS negative  -evaluated by Cardiology in the ED recommending echo with stressed  -will continue with IV fluids, monitor on telemetry

## 2019-11-26 NOTE — H&P
H&P- Trino Watt 1995, 22 y o  female MRN: [id number]    Unit/Bed#: ED 16 Encounter: [id number]    Primary Care Provider: No primary care provider on file  Date and time admitted to hospital: 11/26/2019  8:25 AM        Rapid heart beat  Assessment & Plan  27-year-old female with history of mitral valve prolapse, anxiety, depression, Raynaud's disease presenting with 2nd episode in the ED for symptoms of palpitations, dyspnea and lightheadedness  -CT chest negative for pulmonary embolism, chest x-ray negative  -prior history of snorting cocaine however has not done this in the past year, UDS negative  -evaluated by Cardiology in the ED recommending echo with stressed  -will continue with IV fluids, monitor on telemetry        VTE Prophylaxis: Low risk, early ambulation  /    Code Status: FULL  POLST: There is no POLST form on file for this patient (pre-hospital)    Anticipated Length of Stay:  Patient will be admitted on an Observation basis with an anticipated length of stay of  less than 2 midnights  Justification for Hospital Stay:  Palpitation    Total Time for Visit, including Counseling / Coordination of Care: 30 minutes  Greater than 50% of this total time spent on direct patient counseling and coordination of care  Chief Complaint:   Palpitation    History of Present Illness:    Trino Watt is a 22 y o  female who presents with palpitations  Patient has history of mitral valve prolapse, anxiety, depression, Raynaud's disease presented to the emergency department for the 2nd time in 12 hours for recurrent palpitations, dyspnea and lightheadedness  States her symptoms are worsening now including nausea denies any vomiting  Denies any fever, chills, abdominal pain, diarrhea  Denies any recent travel or sick contact  States she used to use cocaine snorting about 1 year ago but no recent drug use  Denies any alcohol use    Patient reports she has had anxiety attacks in the past but this feels different  Upon arrival to the ED, CT chest negative for PE, cardiology consulted and recommending admit for observation for stress test     Review of Systems:    Review of Systems   Constitutional: Negative  HENT: Negative  Eyes: Negative  Respiratory: Negative  Cardiovascular: Positive for palpitations  Gastrointestinal: Positive for nausea  Endocrine: Negative  Genitourinary: Negative  Musculoskeletal: Negative  Skin: Negative  Allergic/Immunologic: Negative  Neurological: Negative  Hematological: Negative  Psychiatric/Behavioral: Negative  Past Medical and Surgical History:     Past Medical History:   Diagnosis Date    Known health problems: none     Mitral valve prolapse     Psychiatric disorder     anxiety, depression    Raynaud's disease        Past Surgical History:   Procedure Laterality Date    VULVA SURGERY         Meds/Allergies:    Prior to Admission medications    Medication Sig Start Date End Date Taking?  Authorizing Provider   clonazePAM (KlonoPIN) 0 5 mg tablet Take 1 tablet (0 5 mg total) by mouth 2 (two) times a day as needed for anxiety  Patient not taking: Reported on 11/25/2019 1/13/19   Claudean Sine, PA-C   ibuprofen (MOTRIN) 400 mg tablet Take 1 tablet (400 mg total) by mouth every 6 (six) hours as needed for mild pain for up to 4 days 9/6/19 9/10/19  Jeremiah Dennis PA-C   ibuprofen (MOTRIN) 400 mg tablet Take 1 tablet (400 mg total) by mouth every 6 (six) hours as needed for headaches for up to 30 doses  Patient not taking: Reported on 11/25/2019 11/24/19   Susanne Patel PA-C   LORazepam (ATIVAN) 1 mg tablet Take 0 5 tablets (0 5 mg total) by mouth every 8 (eight) hours as needed for anxiety for up to 15 doses  Patient not taking: Reported on 11/25/2019 11/24/19   Susanne Patel PA-C   norgestimate-ethinyl estradiol (ORTHO TRI-CYCLEN LO) 0 18/0 215/0 25 MG-25 MCG per tablet Take 1 tablet by mouth daily    Historical Provider, MD   ondansetron (ZOFRAN-ODT) 4 mg disintegrating tablet Take 1 tablet (4 mg total) by mouth every 6 (six) hours as needed for nausea or vomiting for up to 15 doses  Patient not taking: Reported on 11/25/2019 11/24/19   Sherice Pelaez PA-C     I have reviewed home medications with patient personally  Allergies: Allergies   Allergen Reactions    Cashew Nut Oil     Azithromycin Rash       Social History:     Social History     Substance and Sexual Activity   Alcohol Use Not Currently    Comment: "socially"     Social History     Tobacco Use   Smoking Status Never Smoker   Smokeless Tobacco Never Used     Social History     Substance and Sexual Activity   Drug Use Not Currently    Comment: hasn't used in a few months       Family History:    History reviewed  No pertinent family history  Physical Exam:     Vitals:   Blood Pressure: 120/75 (11/26/19 1000)  Pulse: 77 (11/26/19 1000)  Temperature: 98 °F (36 7 °C) (11/26/19 0827)  Temp Source: Oral (11/26/19 0827)  Respirations: 16 (11/26/19 1000)  Height: 5' 1" (154 9 cm) (11/26/19 0827)  Weight - Scale: 45 8 kg (101 lb) (11/26/19 0827)  SpO2: 100 % (11/26/19 1000)    Constitutional: Patient is oriented to person, place and time, no acute distress  HEENT:  Normocephalic, atraumatic, EOMI, PERRLA, no scleral icterus, no pallor, moist oral mucosa  Neck:  Supple, no masses, no thyromegaly, no bruits Normal range of motion  Lymph nodes:  No lymphadenopathy  Cardiovascular: Normal S1S2, RRR, No murmurs/rubs/gallops appreciated  Pulmonary:  Bilateral air entry, No rhonchi/rales/wheezing appreciated  Abdominal: Soft, Bowel sounds present, Non-tender, Non-distended, No rebound/guarding, no hepatomegaly   Musculoskeletal: No tenderness/abnormality   Extremities:  No cyanosis, clubbing or edema  Peripheral pulses palpable and equal bilaterally  Neurological: Cranial nerves II-XII grossly intact, sensation intact, otherwise no focal neurological symptoms  Skin: Skin is warm and dry, no rashes  Additional Data:     Lab Results: I have personally reviewed pertinent reports  Results from last 7 days   Lab Units 11/26/19  0918   WBC Thousand/uL 9 69   HEMOGLOBIN g/dL 15 9*   HEMATOCRIT % 44 9   PLATELETS Thousands/uL 274   NEUTROS PCT % 63   LYMPHS PCT % 31   MONOS PCT % 6   EOS PCT % 0     Results from last 7 days   Lab Units 11/26/19  0918   POTASSIUM mmol/L 3 3*   CHLORIDE mmol/L 103   CO2 mmol/L 24   BUN mg/dL 8   CREATININE mg/dL 0 74   CALCIUM mg/dL 10 0   ALK PHOS U/L 48   ALT U/L 21   AST U/L 14     Results from last 7 days   Lab Units 11/26/19  0918   INR  1 04       Imaging: I have personally reviewed pertinent reports  X-ray Chest 2 Views    Result Date: 11/26/2019  Narrative: CHEST INDICATION:   chest pain  COMPARISON:  Chest radiograph from 9/6/2019  EXAM PERFORMED/VIEWS:  XR CHEST PA & LATERAL FINDINGS: Cardiomediastinal silhouette appears unremarkable  The lungs are clear  No pneumothorax or pleural effusion  Osseous structures appear within normal limits for patient age  Impression: No acute cardiopulmonary disease  Workstation performed: [id number]     Ct Head Without Contrast    Result Date: 11/24/2019  Narrative: CT BRAIN - WITHOUT CONTRAST INDICATION:   headache  COMPARISON:  None  TECHNIQUE:  CT examination of the brain was performed  In addition to axial images, coronal 2D reformatted images were created and submitted for interpretation  Radiation dose length product (DLP) for this visit:  742 mGy-cm   This examination, like all CT scans performed in the [facility], was performed utilizing techniques to minimize radiation dose exposure, including the use of iterative reconstruction and automated exposure control  IMAGE QUALITY:  Diagnostic  FINDINGS: PARENCHYMA:  No intracranial mass, mass effect or midline shift  No CT signs of acute infarction  No acute parenchymal hemorrhage   VENTRICLES AND EXTRA-AXIAL SPACES:  Normal for the patient's age  VISUALIZED ORBITS AND PARANASAL SINUSES:  Unremarkable  CALVARIUM AND EXTRACRANIAL SOFT TISSUES:  Normal      Impression: No acute intracranial abnormality  Workstation performed: [id number]     Cta Ed Chest Pe Study    Result Date: 11/26/2019  Narrative: CTA - CHEST WITH IV CONTRAST - PULMONARY ANGIOGRAM INDICATION:   Episodic shortness of breath, palpitations, patient tachycardic and on birth control  COMPARISON: None  TECHNIQUE: CTA examination of the chest was performed using angiographic technique according to a protocol specifically tailored to evaluate for pulmonary embolism  Axial, sagittal, and coronal 2D reformatted images were created from the source data and  submitted for interpretation  In addition, coronal 3D MIP postprocessing was performed on the acquisition scanner  Radiation dose length product (DLP) for this visit:  145 mGy-cm   This examination, like all CT scans performed in the [facility], was performed utilizing techniques to minimize radiation dose exposure, including the use of iterative reconstruction and automated exposure control  IV Contrast:  85 mL of iohexol (OMNIPAQUE)  FINDINGS: PULMONARY ARTERIAL TREE:  No pulmonary embolus is seen  LUNGS:  Lungs are clear  Left lower lobe thin-walled cyst in the posterior medial costophrenic angle  There is no tracheal or endobronchial lesion  PLEURA:  Unremarkable  HEART/GREAT VESSELS:  Unremarkable for patient's age  MEDIASTINUM AND CAITLYN:  Unremarkable  CHEST WALL AND LOWER NECK:   Unremarkable  VISUALIZED STRUCTURES IN THE UPPER ABDOMEN:  Unremarkable  OSSEOUS STRUCTURES:  No acute fracture or destructive osseous lesion  Impression: No evidence for pulmonary emboli  No acute thoracic abnormality identified   Workstation performed: [id number]       EKG, Pathology, and Other Studies Reviewed on Admission:   · EKG:  Sinus rhythm with some sinus arrhythmia, T-wave inversion in lead 3    Allscripts / Epic Records Reviewed: Yes     ** Please Note: This note has been constructed using a voice recognition system   **

## 2019-11-26 NOTE — ED PROVIDER NOTES
History  Chief Complaint   Patient presents with    Rapid Heart Rate     Patient presents to ED with c/o rapid heart rate, low BP, and shaking  States she was just seen here for the same about 8 hours ago  Patient is a 27-year-old female with past medical history of mitral valve prolapse, anxiety, depression, Raynaud's disease, presents to the emergency department for the 2nd time in 12 hours for recurrent palpitations, dyspnea and lightheadedness  Patient states she was seen here last night for similar symptoms and had a negative workup including cardiac labs and D-dimer  She reports she went home and she did have recurrence of her symptoms at home but was able to fall asleep and when she woke up this morning she felt very nauseated and could not eat  She had and started to feel lightheaded and felt her heart racing  And she took her pulse, it was in the 120s and she noted her blood pressure was also higher than normal   She reports she has had anxiety attacks before but this does not feel similar to that  She states she did break out into a sweat this morning and although she was not hyperventilating she did feel as though she could not take a deep breath  She reports her legs became very shaky and wobbly  She reports she is feeling better now but still has episodes where she feels her heart racing and feels lightheaded  She denies any chest pain but states the palpitations are uncomfortable  She states last night she had diarrhea but denies any blood per rectum or melena  She denies any recent fever, shaking chills, headache, vertigo, change in vision or hearing, recent cough or URI symptoms, hemoptysis, abdominal pain, vomiting, urinary symptoms, skin rash or color change, extremity swelling or pain, extremity weakness or paresthesia or other focal neurologic deficits  She is on birth control  Denies smoking or drug use        History provided by:  Patient and medical records  Language  used: No    Rapid Heart Rate   Associated symptoms: diaphoresis, nausea and shortness of breath    Associated symptoms: no back pain, no chest pain, no cough, no dizziness, no numbness, no vomiting and no weakness        Prior to Admission Medications   Prescriptions Last Dose Informant Patient Reported? Taking? LORazepam (ATIVAN) 1 mg tablet   No No   Sig: Take 0 5 tablets (0 5 mg total) by mouth every 8 (eight) hours as needed for anxiety for up to 15 doses   Patient not taking: Reported on 11/25/2019   clonazePAM (KlonoPIN) 0 5 mg tablet   No No   Sig: Take 1 tablet (0 5 mg total) by mouth 2 (two) times a day as needed for anxiety   Patient not taking: Reported on 11/25/2019   ibuprofen (MOTRIN) 400 mg tablet   No No   Sig: Take 1 tablet (400 mg total) by mouth every 6 (six) hours as needed for mild pain for up to 4 days   ibuprofen (MOTRIN) 400 mg tablet   No No   Sig: Take 1 tablet (400 mg total) by mouth every 6 (six) hours as needed for headaches for up to 30 doses   Patient not taking: Reported on 11/25/2019   norgestimate-ethinyl estradiol (ORTHO TRI-CYCLEN LO) 0 18/0 215/0 25 MG-25 MCG per tablet   Yes No   Sig: Take 1 tablet by mouth daily   ondansetron (ZOFRAN-ODT) 4 mg disintegrating tablet   No No   Sig: Take 1 tablet (4 mg total) by mouth every 6 (six) hours as needed for nausea or vomiting for up to 15 doses   Patient not taking: Reported on 11/25/2019      Facility-Administered Medications: None       Past Medical History:   Diagnosis Date    Known health problems: none     Mitral valve prolapse     Psychiatric disorder     anxiety, depression    Raynaud's disease        Past Surgical History:   Procedure Laterality Date    VULVA SURGERY         History reviewed  No pertinent family history  I have reviewed and agree with the history as documented      Social History     Tobacco Use    Smoking status: Never Smoker    Smokeless tobacco: Never Used   Substance Use Topics    Alcohol use: Not Currently     Comment: "socially"    Drug use: Not Currently     Comment: hasn't used in a few months        Review of Systems   Constitutional: Positive for appetite change and diaphoresis  Negative for chills and fever  HENT: Negative for congestion, ear pain, rhinorrhea and sore throat  Eyes: Negative for pain and visual disturbance  Respiratory: Positive for shortness of breath  Negative for cough, chest tightness and wheezing  Cardiovascular: Positive for palpitations  Negative for chest pain and leg swelling  Gastrointestinal: Positive for nausea  Negative for abdominal distention, abdominal pain, blood in stool, constipation, diarrhea and vomiting  Genitourinary: Negative for dysuria, flank pain, frequency and hematuria  Musculoskeletal: Negative for back pain, neck pain and neck stiffness  Skin: Negative for color change, pallor, rash and wound  Allergic/Immunologic: Negative for immunocompromised state  Neurological: Positive for light-headedness  Negative for dizziness, syncope, facial asymmetry, speech difficulty, weakness, numbness and headaches  Hematological: Negative for adenopathy  Does not bruise/bleed easily  Psychiatric/Behavioral: Negative for confusion, decreased concentration and dysphoric mood  The patient is not nervous/anxious  All other systems reviewed and are negative  Physical Exam  Physical Exam   Constitutional: She is oriented to person, place, and time  She appears well-developed and well-nourished  No distress  HENT:   Head: Normocephalic and atraumatic  Mouth/Throat: Oropharynx is clear and moist  No oropharyngeal exudate  Eyes: Pupils are equal, round, and reactive to light  Conjunctivae and EOM are normal    Neck: Normal range of motion  Neck supple  No JVD present  Cardiovascular: Regular rhythm, normal heart sounds and intact distal pulses  Exam reveals no gallop and no friction rub  No murmur heard    Mild tachycardia anywhere from 105-118 bpm    Pulmonary/Chest: Effort normal and breath sounds normal  No respiratory distress  She has no wheezes  She has no rales  She exhibits no tenderness  Abdominal: Soft  Bowel sounds are normal  She exhibits no distension  There is no tenderness  There is no rebound and no guarding  Musculoskeletal: Normal range of motion  She exhibits no edema or tenderness  Neurological: She is alert and oriented to person, place, and time  No cranial nerve deficit  No gross motor or sensory deficits  Skin: Skin is warm and dry  No rash noted  She is not diaphoretic  No erythema  No pallor  Psychiatric: She has a normal mood and affect  Her behavior is normal    Nursing note and vitals reviewed        Vital Signs  ED Triage Vitals [11/26/19 0827]   Temperature Pulse Respirations Blood Pressure SpO2   98 °F (36 7 °C) 60 20 140/86 95 %      Temp Source Heart Rate Source Patient Position - Orthostatic VS BP Location FiO2 (%)   Oral Monitor Lying Right arm --      Pain Score       2         Vitals:    11/26/19 0827 11/26/19 1000   BP: 140/86 120/75   BP Location: Right arm Right arm   Pulse: 60 77   Resp: 20 16   Temp: 98 °F (36 7 °C)    TempSrc: Oral    SpO2: 95% 100%   Weight: 45 8 kg (101 lb)    Height: 5' 1" (1 549 m)      Visual Acuity      ED Medications  Medications   ketorolac (TORADOL) injection 15 mg (0 mg Intravenous Hold 11/26/19 1038)   sodium chloride 0 9 % bolus 1,000 mL (0 mL Intravenous Stopped 11/26/19 1019)   hydrOXYzine HCL (ATARAX) tablet 25 mg (25 mg Oral Given 11/26/19 0921)   iohexol (OMNIPAQUE) 350 MG/ML injection (MULTI-DOSE) 85 mL (85 mL Intravenous Given 11/26/19 0938)   potassium chloride (K-DUR,KLOR-CON) CR tablet 40 mEq (40 mEq Oral Given 11/26/19 1033)       Diagnostic Studies  Results Reviewed     Procedure Component Value Units Date/Time    POCT pregnancy, urine [[id number]]  (Normal) Resulted:  11/26/19 1030    Lab Status:  Final result Updated:  11/26/19 1030     EXT PREG TEST UR (Ref: Negative) negative     Control valid    Urine Microscopic [[id number]]  (Abnormal) Collected:  11/26/19 0953    Lab Status:  Final result Specimen:  Urine Updated:  11/26/19 1013     RBC, UA None Seen /hpf      WBC, UA 0-1 /hpf      Epithelial Cells Occasional /hpf      Bacteria, UA None Seen /hpf     Rapid drug screen, urine [[id number]]  (Normal) Collected:  11/26/19 0953    Lab Status:  Final result Specimen:  Urine Updated:  11/26/19 1006     Amph/Meth UR Negative     Barbiturate Ur Negative     Benzodiazepine Urine Negative     Cocaine Urine Negative     Methadone Urine Negative     Opiate Urine Negative     PCP Ur Negative     THC Urine Negative    Narrative:       FOR MEDICAL PURPOSES ONLY  IF CONFIRMATION NEEDED PLEASE CONTACT THE LAB WITHIN 5 DAYS      Drug Screen Cutoff Levels:  AMPHETAMINE/METHAMPHETAMINES  1000 ng/mL  BARBITURATES     200 ng/mL  BENZODIAZEPINES     200 ng/mL  COCAINE      300 ng/mL  METHADONE      300 ng/mL  OPIATES      300 ng/mL  PHENCYCLIDINE     25 ng/mL  THC       50 ng/mL      Basic metabolic panel [[id number]]  (Abnormal) Collected:  11/26/19 0918    Lab Status:  Final result Specimen:  Blood from Arm, Right Updated:  11/26/19 0957     Sodium 142 mmol/L      Potassium 3 3 mmol/L      Chloride 103 mmol/L      CO2 24 mmol/L      ANION GAP 15 mmol/L      BUN 8 mg/dL      Creatinine 0 74 mg/dL      Glucose 107 mg/dL      Calcium 10 0 mg/dL      eGFR 115 ml/min/1 73sq m     Narrative:       [place] guidelines for Chronic Kidney Disease (CKD):     Stage 1 with normal or high GFR (GFR > 90 mL/min/1 73 square meters)    Stage 2 Mild CKD (GFR = 60-89 mL/min/1 73 square meters)    Stage 3A Moderate CKD (GFR = 45-59 mL/min/1 73 square meters)    Stage 3B Moderate CKD (GFR = 30-44 mL/min/1 73 square meters)    Stage 4 Severe CKD (GFR = 15-29 mL/min/1 73 square meters)    Stage 5 End Stage CKD (GFR <15 mL/min/1 73 square meters)  Note: GFR calculation is accurate only with a steady state creatinine    Hepatic function panel [[id number]]  (Abnormal) Collected:  11/26/19 0918    Lab Status:  Final result Specimen:  Blood from Arm, Right Updated:  11/26/19 0957     Total Bilirubin 0 40 mg/dL      Bilirubin, Direct 0 08 mg/dL      Alkaline Phosphatase 48 U/L      AST 14 U/L      ALT 21 U/L      Total Protein 8 3 g/dL      Albumin 4 0 g/dL     Lipase [[id number]]  (Normal) Collected:  11/26/19 0918    Lab Status:  Final result Specimen:  Blood from Arm, Right Updated:  11/26/19 0957     Lipase 97 u/L     TSH, 3rd generation with Free T4 reflex [[id number]]  (Normal) Collected:  11/26/19 0918    Lab Status:  Final result Specimen:  Blood from Arm, Right Updated:  11/26/19 0957     TSH 3RD GENERATON 2 811 uIU/mL     Narrative:       Patients undergoing fluorescein dye angiography may retain small amounts of fluorescein in the body for 48-72 hours post procedure  Samples containing fluorescein can produce falsely depressed TSH values  If the patient had this procedure,a specimen should be resubmitted post fluorescein clearance        Magnesium [[id number]]  (Normal) Collected:  11/26/19 0918    Lab Status:  Final result Specimen:  Blood from Arm, Right Updated:  11/26/19 0957     Magnesium 1 6 mg/dL     UA (URINE) with reflex to Scope [[id number]]  (Abnormal) Collected:  11/26/19 0953    Lab Status:  Final result Specimen:  Urine Updated:  11/26/19 0955     Color, UA Yellow     Clarity, UA Clear     Specific Gravity, UA <=1 005     pH, UA 6 5     Leukocytes, UA Small     Nitrite, UA Negative     Protein, UA Negative mg/dl      Glucose, UA Negative mg/dl      Ketones, UA Negative mg/dl      Urobilinogen, UA 0 2 E U /dl      Bilirubin, UA Negative     Blood, UA Negative    Troponin I [[id number]]  (Normal) Collected:  11/26/19 0918    Lab Status:  Final result Specimen:  Blood from Arm, Right Updated:  11/26/19 0946     Troponin I <0 02 ng/mL Protime-INR [[id number]]  (Normal) Collected:  11/26/19 0918    Lab Status:  Final result Specimen:  Blood from Arm, Right Updated:  11/26/19 0939     Protime 13 6 seconds      INR 1 04    APTT [[id number]]  (Normal) Collected:  11/26/19 0918    Lab Status:  Final result Specimen:  Blood from Arm, Right Updated:  11/26/19 0939     PTT 24 seconds     CBC and differential [[id number]]  (Abnormal) Collected:  11/26/19 0918    Lab Status:  Final result Specimen:  Blood from Arm, Right Updated:  11/26/19 0931     WBC 9 69 Thousand/uL      RBC 4 83 Million/uL      Hemoglobin 15 9 g/dL      Hematocrit 44 9 %      MCV 93 fL      MCH 32 9 pg      MCHC 35 4 g/dL      RDW 12 3 %      MPV 10 5 fL      Platelets 081 Thousands/uL      nRBC 0 /100 WBCs      Neutrophils Relative 63 %      Immat GRANS % 0 %      Lymphocytes Relative 31 %      Monocytes Relative 6 %      Eosinophils Relative 0 %      Basophils Relative 0 %      Neutrophils Absolute 6 06 Thousands/µL      Immature Grans Absolute 0 03 Thousand/uL      Lymphocytes Absolute 3 00 Thousands/µL      Monocytes Absolute 0 56 Thousand/µL      Eosinophils Absolute 0 02 Thousand/µL      Basophils Absolute 0 02 Thousands/µL     Troponin I [[id number]]     Lab Status:  No result Specimen:  Blood                  CTA ED chest PE study   Final Result by Maryc Stanford MD (11/26 5468)      No evidence for pulmonary emboli  No acute thoracic abnormality identified  Workstation performed: [id number]                    Procedures  ECG 12 Lead Documentation Only  Date/Time: 11/26/2019 9:03 AM  Performed by: Cornelius Salvador DO  Authorized by: Cornelius Salvador DO     ECG reviewed by me, the ED Provider: yes    Patient location:  ED  Previous ECG:     Previous ECG:  Compared to current    Comparison ECG info:  11-25-19; nonspecific ST abnormality noted in the inferior leads  Nonspecific T-wave abnormality now in the lateral leads      Similarity:  Changes noted  Rate: ECG rate:  100    ECG rate assessment: normal    Rhythm:     Rhythm: sinus rhythm      Rhythm comment:  With short WA  Ectopy:     Ectopy: none    QRS:     QRS axis:  Normal    QRS intervals:  Normal  Conduction:     Conduction: normal    ST segments:     ST segments:  Non-specific    Depression:  II, III and aVF  T waves:     T waves: inverted      Inverted:  III  ECG 12 Lead Documentation Only  Date/Time: 11/26/2019 9:14 AM  Performed by: Bassam Coombs DO  Authorized by: Bassam Coombs DO     ECG reviewed by me, the ED Provider: yes    Patient location:  ED  Previous ECG:     Previous ECG:  Compared to current  Rate:     ECG rate:  115    ECG rate assessment: tachycardic    Rhythm:     Rhythm: sinus tachycardia    Ectopy:     Ectopy: none    QRS:     QRS axis:  Normal    QRS intervals:  Normal  Conduction:     Conduction: normal    ST segments:     ST segments:  Abnormal    Depression:  II, III, aVF, V4, V5 and V6  T waves:     T waves: normal    ECG 12 Lead Documentation Only  Date/Time: 11/26/2019 10:13 AM  Performed by: Bassam Coombs DO  Authorized by: Bassam Coombs DO     ECG reviewed by me, the ED Provider: yes    Patient location:  ED  Previous ECG:     Previous ECG:  Compared to current    Comparison ECG info:  T-wave inversion less evident in the inferior leads  Nonspecific T-wave abnormality also much improved in the inferior leads  Rate:     ECG rate:  86    ECG rate assessment: normal    Rhythm:     Rhythm: sinus rhythm      Rhythm comment:  With sinus arrhythmia  Ectopy:     Ectopy: none    QRS:     QRS axis:  Normal    QRS intervals:  Normal  Conduction:     Conduction: normal    ST segments:     ST segments:  Normal  T waves:     T waves: non-specific             ED Course  ED Course as of Nov 26 1041   Tue Nov 26, 2019   0912 Patient started to feel very symptomatic again and called the nursing button  When this tech responded, she was noted to be 130s on the monitor    Repeat EKG showed sinus tach of 115 and worsening ST depression in the inferior leads  9799 Troponin I: <0 02   1000 TSH 3RD GENERATON: 2 811   1000 Magnesium: 1 6   1013 Repeat EKG now that patient's rate is improved in the 80s shows improvement in ST changes  Most likely the ST changes were rate related  I did speak with Cardiology who was already down in the ED seeing another patient about this patient and I do feel this is likely rate related and also probably related to anxiety  They will do formal cardiology consult  1040 Patient was assessed by Cardiology and cardiology recommended inpatient stress test and limited echocardiogram given the nonspecific ST changes  Patient and SLIM agreeable  Yolie Reyes' Criteria for PE      Most Recent Value   Wells' Criteria for PE   Clinical signs and symptoms of DVT  0 Filed at: 11/26/2019 8337   PE is primary diagnosis or equally likely  3 Filed at: 11/26/2019 0904   HR >100  1 5 Filed at: 11/26/2019 0904   Immobilization at least 3 days or Surgery in the previous 4 weeks  0 Filed at: 11/26/2019 5634   Previous, objectively diagnosed PE or DVT  0 Filed at: 11/26/2019 0904   Hemoptysis  0 Filed at: 11/26/2019 4428   Malignancy with treatment within 6 months or palliative  0 Filed at: 11/26/2019 0786   Wells' Criteria Total  4 5 Filed at: 11/26/2019 7334            MDM  Number of Diagnoses or Management Options  Diagnosis management comments: 78-year-old female re-presented to the ED for recurrent palpitations, chest discomfort, dyspnea and lightheadedness  Most likely patient has anxiety and having recurrent anxiety attacks however she is tachycardic and there are EKG changes so will repeat cardiac workup and obtain CTA of the chest to definitively rule out PE  Will give IV fluids and Atarax for anxiety    Patient has a history of using benzos and she reports it took her 6 months to get off of the benzos and she would prefer not to be restarted on them        Amount and/or Complexity of Data Reviewed  Clinical lab tests: ordered and reviewed  Tests in the radiology section of CPT®: ordered and reviewed  Tests in the medicine section of CPT®: ordered and reviewed  Review and summarize past medical records: yes  Independent visualization of images, tracings, or specimens: yes        Disposition  Final diagnoses:   Abnormal EKG   Palpitations   Anxiety   Nonspecific abnormal electrocardiogram (ECG) (EKG)   Sinus tachycardia     Time reflects when diagnosis was documented in both MDM as applicable and the Disposition within this note     Time User Action Codes Description Comment    11/26/2019  9:51 AM Maryann Font E Add [R94 31] Abnormal EKG     11/26/2019  9:51 AM Maryann Font E Add [R00 2] Palpitations     11/26/2019 10:14 AM Maryann Font E Add [F41 9] Anxiety     11/26/2019 10:15 AM Maryann Font E Add [R94 31] Nonspecific abnormal electrocardiogram (ECG) (EKG)     11/26/2019 10:41 AM Maryann Font E Add [R00 0] Sinus tachycardia       ED Disposition     ED Disposition Condition Date/Time Comment    Admit Stable Tue Nov 26, 2019 10:41 AM Case was discussed with CLEMENTE and the patient's admission status was agreed to be Admission Status: observation status to the service of Dr Levon Castellano   Follow-up Information    None         Patient's Medications   Discharge Prescriptions    No medications on file     No discharge procedures on file      ED Provider  Electronically Signed by           Cornelius Salvador DO  11/26/19 3796

## 2019-11-26 NOTE — CONSULTS
Consult to cardiology  Consult performed by: Juany Salamanca MD  Consult ordered by: Senthil Song MD      Reason for consult  Palpitations including chest pain, an abnormal EKG    Bonnie Poole is a 25-year-old female with history of mitral valve prolapse, anxiety presented with complaints of palpitation, lightheadedness and feeling like going to pass out  Patient is having these symptoms for at least a week but are getting more frequent lately  She also complains of nausea, diaphoresis and frequent urination  Her spells are associated with shaking like chills  She also complains of atypical chest   Denies shortness of breath, abdominal pain or loss of consciousness  Exercise tolerance is unlimited otherwise except she is tired lately  She has multiple visits to emergency department for similar symptoms in the past   She was seen at a private cardiology group in [place] 2 months back where her transthoracic echocardiogram was okay as per patient with mild mitral regurgitation(no actual report to review)  Denies smoking, alcohol intake or illicit drug use  No family history of premature coronary artery disease or sudden cardiac death  Serial cardiac enzymes are negative  EKG showed nonspecific ST depression inferiorly more when patient was noted to have sinus tachycardia heart rate of 115  ST changes were better when heart rate was in 80s  Physical exam  General:  Awake, alert and oriented x3, 10 moderate distress  Neck:  No JVD  Lungs:  Bilateral air entry positive no wheeze or crackles  Heart:  S1-S2 normal no murmur  Abdomen:  Soft no discharge noted above some positive  Extremities:  No leg edema  Neuro:  Moving all extremities    Labs including vitals reviewed    Assessment:  Chest pain:  Atypical in nature  Acute MI has been ruled out  EKG showed nonspecific ST T wave changes  Palpitation:  Never had syncope  No previous history of arrhythmia  Mitral valve prolapse:   As per patient recent transthoracic echo before 2 months showed normal LV with mild MR-no actual report to review  Anxiety    Recommendation:  Continue telemetry monitor  Young patient with recurrent chest pain including palpation associated with diaphoresis with multiple ED visit in last few months and normal LVEF on echo would benefit from further coronary artery disease evaluation - stress echocardiogram  Further recommendation depending on clinical and above test findings  If stress echocardiogram is normal then no further cardiac workup is recommended at present time  Patient will need outpatient event monitor if  remains symptomatic  Above discuss with patient    Patient understands and agrees

## 2019-11-27 ENCOUNTER — HOSPITAL ENCOUNTER (EMERGENCY)
Facility: HOSPITAL | Age: 24
Discharge: HOME | End: 2019-11-27
Attending: EMERGENCY MEDICINE
Payer: COMMERCIAL

## 2019-11-27 VITALS
BODY MASS INDEX: 17.56 KG/M2 | RESPIRATION RATE: 19 BRPM | DIASTOLIC BLOOD PRESSURE: 71 MMHG | HEIGHT: 61 IN | OXYGEN SATURATION: 98 % | HEART RATE: 95 BPM | SYSTOLIC BLOOD PRESSURE: 119 MMHG | TEMPERATURE: 98 F | WEIGHT: 93 LBS

## 2019-11-27 VITALS
WEIGHT: 95.9 LBS | TEMPERATURE: 98.3 F | OXYGEN SATURATION: 100 % | HEART RATE: 74 BPM | RESPIRATION RATE: 16 BRPM | BODY MASS INDEX: 18.11 KG/M2 | SYSTOLIC BLOOD PRESSURE: 106 MMHG | DIASTOLIC BLOOD PRESSURE: 69 MMHG | HEIGHT: 61 IN

## 2019-11-27 DIAGNOSIS — R00.2 PALPITATIONS: ICD-10-CM

## 2019-11-27 DIAGNOSIS — F41.9 ANXIETY: Primary | ICD-10-CM

## 2019-11-27 DIAGNOSIS — R11.0 NAUSEA: ICD-10-CM

## 2019-11-27 LAB
ALBUMIN SERPL BCP-MCNC: 3.7 G/DL (ref 3.5–5)
ALP SERPL-CCNC: 46 U/L (ref 46–116)
ALT SERPL W P-5'-P-CCNC: 19 U/L (ref 12–78)
ANION GAP SERPL CALCULATED.3IONS-SCNC: 9 MMOL/L (ref 4–13)
AST SERPL W P-5'-P-CCNC: 14 U/L (ref 5–45)
ATRIAL RATE: 104 BPM
ATRIAL RATE: 73 BPM
ATRIAL RATE: 87 BPM
ATRIAL RATE: 93 BPM
ATRIAL RATE: 98 BPM
BASOPHILS # BLD AUTO: 0.02 THOUSANDS/ΜL (ref 0–0.1)
BASOPHILS NFR BLD AUTO: 0 % (ref 0–1)
BILIRUB SERPL-MCNC: 0.4 MG/DL (ref 0.2–1)
BUN SERPL-MCNC: 9 MG/DL (ref 5–25)
CALCIUM SERPL-MCNC: 9.6 MG/DL (ref 8.3–10.1)
CHLORIDE SERPL-SCNC: 105 MMOL/L (ref 100–108)
CO2 SERPL-SCNC: 27 MMOL/L (ref 21–32)
CREAT SERPL-MCNC: 0.76 MG/DL (ref 0.6–1.3)
EOSINOPHIL # BLD AUTO: 0.02 THOUSAND/ΜL (ref 0–0.61)
EOSINOPHIL NFR BLD AUTO: 0 % (ref 0–6)
ERYTHROCYTE [DISTWIDTH] IN BLOOD BY AUTOMATED COUNT: 12.9 % (ref 11.6–15.1)
GFR SERPL CREATININE-BSD FRML MDRD: 111 ML/MIN/1.73SQ M
GLUCOSE SERPL-MCNC: 105 MG/DL (ref 65–140)
HCT VFR BLD AUTO: 43 % (ref 34.8–46.1)
HGB BLD-MCNC: 14.7 G/DL (ref 11.5–15.4)
IMM GRANULOCYTES # BLD AUTO: 0.01 THOUSAND/UL (ref 0–0.2)
IMM GRANULOCYTES NFR BLD AUTO: 0 % (ref 0–2)
LYMPHOCYTES # BLD AUTO: 1.49 THOUSANDS/ΜL (ref 0.6–4.47)
LYMPHOCYTES NFR BLD AUTO: 20 % (ref 14–44)
MCH RBC QN AUTO: 33 PG (ref 26.8–34.3)
MCHC RBC AUTO-ENTMCNC: 34.2 G/DL (ref 31.4–37.4)
MCV RBC AUTO: 97 FL (ref 82–98)
MONOCYTES # BLD AUTO: 0.25 THOUSAND/ΜL (ref 0.17–1.22)
MONOCYTES NFR BLD AUTO: 3 % (ref 4–12)
NEUTROPHILS # BLD AUTO: 5.65 THOUSANDS/ΜL (ref 1.85–7.62)
NEUTS SEG NFR BLD AUTO: 77 % (ref 43–75)
NRBC BLD AUTO-RTO: 0 /100 WBCS
P AXIS: 15 DEGREES
P AXIS: 25 DEGREES
P AXIS: 52 DEGREES
P AXIS: 59 DEGREES
P AXIS: 61 DEGREES
PLATELET # BLD AUTO: 241 THOUSANDS/UL (ref 149–390)
PMV BLD AUTO: 10 FL (ref 8.9–12.7)
POTASSIUM SERPL-SCNC: 3.8 MMOL/L (ref 3.5–5.3)
PR INTERVAL: 104 MS
PR INTERVAL: 104 MS
PR INTERVAL: 112 MS
PR INTERVAL: 120 MS
PR INTERVAL: 126 MS
PROT SERPL-MCNC: 7.8 G/DL (ref 6.4–8.2)
QRS AXIS: 76 DEGREES
QRS AXIS: 78 DEGREES
QRS AXIS: 81 DEGREES
QRS AXIS: 83 DEGREES
QRS AXIS: 84 DEGREES
QRSD INTERVAL: 70 MS
QRSD INTERVAL: 76 MS
QRSD INTERVAL: 78 MS
QRSD INTERVAL: 78 MS
QRSD INTERVAL: 82 MS
QT INTERVAL: 336 MS
QT INTERVAL: 352 MS
QT INTERVAL: 360 MS
QT INTERVAL: 362 MS
QT INTERVAL: 382 MS
QTC INTERVAL: 420 MS
QTC INTERVAL: 433 MS
QTC INTERVAL: 441 MS
QTC INTERVAL: 449 MS
QTC INTERVAL: 450 MS
RBC # BLD AUTO: 4.45 MILLION/UL (ref 3.81–5.12)
SODIUM SERPL-SCNC: 141 MMOL/L (ref 136–145)
T WAVE AXIS: -30 DEGREES
T WAVE AXIS: 22 DEGREES
T WAVE AXIS: 23 DEGREES
T WAVE AXIS: 32 DEGREES
T WAVE AXIS: 38 DEGREES
TROPONIN I SERPL-MCNC: <0.02 NG/ML
TROPONIN I SERPL-MCNC: <0.02 NG/ML
VENTRICULAR RATE: 104 BPM
VENTRICULAR RATE: 73 BPM
VENTRICULAR RATE: 87 BPM
VENTRICULAR RATE: 93 BPM
VENTRICULAR RATE: 98 BPM
WBC # BLD AUTO: 7.44 THOUSAND/UL (ref 4.31–10.16)

## 2019-11-27 PROCEDURE — 99284 EMERGENCY DEPT VISIT MOD MDM: CPT

## 2019-11-27 PROCEDURE — 85025 COMPLETE CBC W/AUTO DIFF WBC: CPT | Performed by: PHYSICIAN ASSISTANT

## 2019-11-27 PROCEDURE — 93010 ELECTROCARDIOGRAM REPORT: CPT | Performed by: INTERNAL MEDICINE

## 2019-11-27 PROCEDURE — 93005 ELECTROCARDIOGRAM TRACING: CPT

## 2019-11-27 PROCEDURE — 99285 EMERGENCY DEPT VISIT HI MDM: CPT | Performed by: PHYSICIAN ASSISTANT

## 2019-11-27 PROCEDURE — 36415 COLL VENOUS BLD VENIPUNCTURE: CPT | Performed by: PHYSICIAN ASSISTANT

## 2019-11-27 PROCEDURE — 84484 ASSAY OF TROPONIN QUANT: CPT | Performed by: PHYSICIAN ASSISTANT

## 2019-11-27 PROCEDURE — 80053 COMPREHEN METABOLIC PANEL: CPT | Performed by: PHYSICIAN ASSISTANT

## 2019-11-27 RX ORDER — ONDANSETRON 4 MG/1
4 TABLET, FILM COATED ORAL EVERY 6 HOURS
Qty: 12 TABLET | Refills: 0 | Status: SHIPPED | OUTPATIENT
Start: 2019-11-27 | End: 2019-12-02

## 2019-11-27 RX ORDER — ONDANSETRON 4 MG/1
4 TABLET, ORALLY DISINTEGRATING ORAL ONCE
Status: COMPLETED | OUTPATIENT
Start: 2019-11-27 | End: 2019-11-27

## 2019-11-27 RX ADMIN — SODIUM CHLORIDE 75 ML/HR: 0.9 INJECTION, SOLUTION INTRAVENOUS at 08:31

## 2019-11-27 RX ADMIN — ONDANSETRON 4 MG: 4 TABLET, ORALLY DISINTEGRATING ORAL at 19:06

## 2019-11-27 NOTE — DISCHARGE SUMMARY
Discharge- Hank Gagnon 1995, 22 y o  female MRN: [id number]    Unit/Bed#: -01 Encounter: [id number]    Primary Care Provider: No primary care provider on file  Date and time admitted to hospital: 11/26/2019  8:25 AM        * Rapid heart beat  Assessment & Plan  23-year-old female with history of mitral valve prolapse, anxiety, depression, Raynaud's disease presenting with 2nd episode in the ED for symptoms of palpitations, dyspnea and lightheadedness  -CT chest negative for pulmonary embolism, chest x-ray negative  -prior history of snorting cocaine however has not done this in the past year, UDS negative  -evaluated by Cardiology in the ED recommending echo with stressed which was normal  Patient would benefit from hormonal workup possible Holter monitoring  Patient is new from area recommend workup with local PCP and OBGYN            Discharging Physician / Practitioner: SETH Quintana  PCP: No primary care provider on file  Admission Date:   Admission Orders (From admission, onward)     Ordered        11/26/19 1041  Place in Observation  Once                   Discharge Date: 11/27/19    Resolved Problems  Date Reviewed: 11/27/2019    None          Consultations During Hospital Stay:  · Cardiology    Procedures Performed:   · Echo stress normal    Significant Findings / Test Results:   · Rapid heart rate suspect anxiety driven    Incidental Findings:   · None     Test Results Pending at Discharge (will require follow up): · None     Outpatient Tests Requested:  · Holter monitoring    Complications:  None    Reason for Admission:  Rapid heart rate observation    Hospital Course:     Hank Gagnon is a 22 y o  female patient who originally presented to the hospital on 11/26/2019 due to palpitation history of mitral valve prolapse anxiety depression Raynaud's disease presenting to the emergency room 2nd time in 12 hours for recurrent palpitations dyspnea and lightheadedness  Patient's symptoms of worsening including nausea and denies vomiting  Denies any fevers chills abdominal pain patient was is history of contained starting about a year ago but denies any recent drug use rapid drug screen is negative patient was evaluated by Cardiology recommended overnight observation with no events recorded on telemetry patient was evaluated by Cardiology who recommended an echo stress that was normal   Patient was discussed overnight had a normal TSH personal discussion with slim provider have recommended to have a hormonal workup Holter monitoring and to find a PCP in the area patient was agreeable and discharged home  Please see above list of diagnoses and related plan for additional information  Condition at Discharge: good     Discharge Day Visit / Exam:     Subjective:  Patient tearful when she was told her echo stress was normal and that there is no clinical findings and was discharged home patient reports she did feel comfortable discharging home initially after much discussion patient feels everyone keeps brushing her off that this is just anxiety  After much discussion she felt that there would be more diagnostic workup in the way of hormones her adrenal glands  After much discussion and realizing it is not her heart given the negative workup patient made aware that she needs to find an Ob and a PCP in the area  Vitals: Blood Pressure: 106/69 (11/27/19 0729)  Pulse: 74 (11/27/19 0729)  Temperature: 98 3 °F (36 8 °C) (11/27/19 0729)  Temp Source: Oral (11/27/19 0315)  Respirations: 16 (11/27/19 0729)  Height: 5' 1" (154 9 cm) (11/26/19 0827)  Weight - Scale: 43 5 kg (95 lb 14 4 oz) (11/26/19 1722)  SpO2: 100 % (11/27/19 0729)  Exam:   Physical Exam   Constitutional: She is oriented to person, place, and time  HENT:   Head: Normocephalic and atraumatic  Eyes: Conjunctivae and EOM are normal    Neck: Normal range of motion     Cardiovascular: Normal rate, regular rhythm and normal heart sounds  Pulmonary/Chest: Effort normal and breath sounds normal    Abdominal: Soft  Bowel sounds are normal    Musculoskeletal: Normal range of motion  Neurological: She is alert and oriented to person, place, and time  Skin: Skin is warm and dry  Psychiatric: She has a normal mood and affect  Her behavior is normal            Discharge instructions/Information to patient and family:   See after visit summary for information provided to patient and family  Provisions for Follow-Up Care:  See after visit summary for information related to follow-up care and any pertinent home health orders  Disposition:     Home    For Discharges to [facility]:   · Not Applicable to this Patient - Not Applicable to this Patient    Planned Readmission:  None     Discharge Statement:  I spent 32 minutes discharging the patient  This time was spent on the day of discharge  I had direct contact with the patient on the day of discharge  Greater than 50% of the total time was spent examining patient, answering all patient questions, arranging and discussing plan of care with patient as well as directly providing post-discharge instructions  Additional time then spent on discharge activities  Discharge Medications:  See after visit summary for reconciled discharge medications provided to patient and family        ** Please Note: This note has been constructed using a voice recognition system **

## 2019-11-27 NOTE — UTILIZATION REVIEW
Initial Clinical Review    Admission: Date/Time/Statement: Observation 11/26/19 @1041  Orders Placed This Encounter   Procedures    Place in Observation     Standing Status:   Standing     Number of Occurrences:   1     Order Specific Question:   Admitting Physician     Answer:   Janki Leong [[id number]]     Order Specific Question:   Level of Care     Answer:   Med Surg [[id number]]     Order Specific Question:   Bed request comments     Answer:   tele     ED Arrival Information     Expected Arrival 70 Sheila Alonso of Arrival Escorted By Service Admission Type    - 11/26/2019 08:17 Urgent Walk-In Family Member General Medicine Urgent    Arrival Complaint    Rapid heart        Chief Complaint   Patient presents with    Rapid Heart Rate     Patient presents to ED with c/o rapid heart rate, low BP, and shaking  States she was just seen here for the same about 8 hours ago  Assessment/Plan: 22year old female to the ED from home with complaints of rapid heart rate, low bp  Admitted under observation for rapid heart beat  H/O mitral valve prolapse, anxiety, depression, raynauds coming into ED for 2nd time the same day for evaluation of palpitations, lightheadedness and dyspnea  First evaluation in ED revealed negative cardiac labs and D-dimer  Symptoms recurred after discharge  Pulse at home was 120  Upon arrival to Ed, found to have 105-118bpm  EKG shows STachy with ST depressions in inferior lead  Trend troponins, initial was negative  IV fluids initiated, Atarax for anxiety  EKG changes likely related to rate as they are gone with HR in 80s  Check Stress ECHo, monitor tele  Used to snort cocaine, but hasn't in about a year  CT neg for PE  Cardiology consult 11/26: EKG showed nonspecific ST depression inferiorly more when patient was noted to have sinus tachycardia heart rate of 115  ST changes were better when heart rate was in 80s  Continue tele  Check stress ECHo    Mulitple visits for same complaints recently  ED Triage Vitals [11/26/19 0827]   Temperature Pulse Respirations Blood Pressure SpO2   98 °F (36 7 °C) 60 20 140/86 95 %      Temp Source Heart Rate Source Patient Position - Orthostatic VS BP Location FiO2 (%)   Oral Monitor Lying Right arm --      Pain Score       2        Wt Readings from Last 1 Encounters:   11/26/19 43 5 kg (95 lb 14 4 oz)     Additional Vital Signs:   Date/Time  Temp  Pulse  Resp  BP  MAP (mmHg)  SpO2  O2 Device  Patient Position - Orthostatic VS   11/27/19 07:29:02  98 3 °F (36 8 °C)  74  16  106/69  81  100 %       11/27/19 03:15:37  99 1 °F (37 3 °C)  71    105/69  81  100 %  None (Room air)  Lying   11/26/19 23:56:07  99 5 °F (37 5 °C)  97    134/89  104  100 %       11/26/19 23:50:02        133/86  102         11/26/19 23:21:44  100 °F (37 8 °C)  81  18  124/69  87  100 %       11/26/19 18:51:25  98 6 °F (37 °C)  93  17  130/89  103  100 %       11/26/19 18:47:17    102    130/89  103  100 %       11/26/19 18:26:52    98    134/92  106  100 %       11/26/19 17:33:27  99 5 °F (37 5 °C)  90  18  130/87             Pertinent Labs/Diagnostic Test Results:   CTA Ed chest PE 11/26:  No evidence for pulmonary emboli   No acute thoracic abnormality identified  CXR 11/26:  No acute cardiopulmonary disease  EKG:  Comparison ECG info:  11-25-19; nonspecific ST abnormality noted in the inferior leads  Nonspecific T-wave abnormality now in the lateral leads      Similarity:  Changes noted  Rate:     ECG rate:  100    ECG rate assessment: normal    Rhythm:     Rhythm: sinus rhythm      Rhythm comment:  With short IL  Ectopy:     Ectopy: none    QRS:     QRS axis:  Normal    QRS intervals:  Normal  Conduction:     Conduction: normal    ST segments:     ST segments:  Non-specific    Depression:  II, III and aVF  T waves:     T waves: inverted      Inverted:  III  Results from last 7 days   Lab Units 11/26/19  0918 11/25/19  2327   WBC Thousand/uL 9 69 6 90 HEMOGLOBIN g/dL 15 9* 14 8   HEMATOCRIT % 44 9 43 9   PLATELETS Thousands/uL 274 244   NEUTROS ABS Thousands/µL 6 06 4 76         Results from last 7 days   Lab Units 11/26/19  0918 11/25/19  2327   SODIUM mmol/L 142 140   POTASSIUM mmol/L 3 3* 4 1   CHLORIDE mmol/L 103 104   CO2 mmol/L 24 31   ANION GAP mmol/L 15* 5   BUN mg/dL 8 12   CREATININE mg/dL 0 74 0 65   EGFR ml/min/1 73sq m 115 126   CALCIUM mg/dL 10 0 9 6   MAGNESIUM mg/dL 1 6  --      Results from last 7 days   Lab Units 11/26/19  0918   AST U/L 14   ALT U/L 21   ALK PHOS U/L 48   TOTAL PROTEIN g/dL 8 3*   ALBUMIN g/dL 4 0   TOTAL BILIRUBIN mg/dL 0 40   BILIRUBIN DIRECT mg/dL 0 08         Results from last 7 days   Lab Units 11/26/19  0918 11/25/19  2327   GLUCOSE RANDOM mg/dL 107 118     Results from last 7 days   Lab Units 11/26/19  1254 11/26/19  0918 11/25/19  2327   TROPONIN I ng/mL <0 02 <0 02 <0 02     Results from last 7 days   Lab Units 11/25/19  2327   D-DIMER QUANTITATIVE ug/ml FEU 0 33     Results from last 7 days   Lab Units 11/26/19  0918   PROTIME seconds 13 6   INR  1 04   PTT seconds 24     Results from last 7 days   Lab Units 11/26/19  0918   TSH 3RD GENERATON uIU/mL 2 811     Results from last 7 days   Lab Units 11/26/19  0918   LIPASE u/L 97             Results from last 7 days   Lab Units 11/26/19  0953   CLARITY UA  Clear   COLOR UA  Yellow   SPEC GRAV UA  <=1 005   PH UA  6 5   GLUCOSE UA mg/dl Negative   KETONES UA mg/dl Negative   BLOOD UA  Negative   PROTEIN UA mg/dl Negative   NITRITE UA  Negative   BILIRUBIN UA  Negative   UROBILINOGEN UA E U /dl 0 2   LEUKOCYTES UA  Small*   WBC UA /hpf 0-1*   RBC UA /hpf None Seen   BACTERIA UA /hpf None Seen   EPITHELIAL CELLS WET PREP /hpf Occasional     Results from last 7 days   Lab Units 11/26/19  0953   AMPH/METH  Negative   BARBITURATE UR  Negative   BENZODIAZEPINE UR  Negative   COCAINE UR  Negative   METHADONE URINE  Negative   OPIATE UR  Negative   PCP UR  Negative   THC UR Negative     ED Treatment:   Medication Administration from 11/26/2019 0817 to 11/26/2019 1719       Date/Time Order Dose Route Action     11/26/2019 0919 sodium chloride 0 9 % bolus 1,000 mL 1,000 mL Intravenous New Bag     11/26/2019 0921 hydrOXYzine HCL (ATARAX) tablet 25 mg 25 mg Oral Given     11/26/2019 1033 potassium chloride (K-DUR,KLOR-CON) CR tablet 40 mEq 40 mEq Oral Given     11/26/2019 1255 sodium chloride 0 9 % infusion 75 mL/hr Intravenous New Bag        Past Medical History:   Diagnosis Date    Known health problems: none     Mitral valve prolapse     Psychiatric disorder     anxiety, depression    Raynaud's disease      Admitting Diagnosis: Palpitations [R00 2]  Nonspecific abnormal electrocardiogram (ECG) (EKG) [R94 31]  Sinus tachycardia [R00 0]  Rapid heart beat [R00 0]  Anxiety [F41 9]  Abnormal EKG [R94 31]  Age/Sex: 22 y o  female  Admission Orders:  Tele  ECHO  Scheduled Medications:    Medications:  ketorolac 15 mg Intravenous Once     Continuous IV Infusions:    sodium chloride 75 mL/hr Intravenous Continuous     PRN Meds:    clonazePAM 0 5 mg Oral BID PRN   ondansetron 4 mg Intravenous Q6H PRN       IP CONSULT TO CARDIOLOGY    Network Utilization Review Department  [email or web address]  ATTENTION: Please call with any questions or concerns to [phone] and carefully listen to the prompts so that you are directed to the right person  All voicemails are confidential   Alvaro Galarza all requests for admission clinical reviews, approved or denied determinations and any other requests to dedicated fax number below belonging to the campus where the patient is receiving treatment    FACILITY NAME UR FAX NUMBER   ADMISSION DENIALS (Administrative/Medical Necessity) [address] [facility] (Maternity/NICU/Pediatrics) [address] [place] [address] [phone]   [facility] [phone]     [facility] [phone]   [address] [phone]

## 2019-11-27 NOTE — ED PROVIDER NOTES
History  Chief Complaint   Patient presents with    High Blood Pressure     Pt states she was discharged earlier today but when she went home and she retook her blood pressure at home and it was high  22yo female with a PMH of anxiety presenting for evaluation of an elevated blood pressure reading  Patient has been seen three times within the past week  She was initially seen for headache on 11/24 and received the migraine cocktail  She was discharged with a prescription for Ativan  Patient was then seen twice for chest pain and palpitations  She was noted to have some EKG changes and was admitted yesterday  Patient underwent a stress test and was seen by cardiology  The stress test was normal and she was discharged earlier this morning  Patient went home and was feeling unwell  About an hour prior to arrival, patient abruptly began feeling a chest pressure with associated palpitations  She felt anxious at that time and was diaphoretic  She took her blood pressure and it was 135/100 and decided to return to the ER for evaluation  Patient took Klonopin prior to arrival with some improvement  Patient denies fevers, chills, abdominal pain, vomiting, diarrhea, syncope  History provided by:  Patient and medical records   used: No    Hypertension   Severity:  Mild  Onset quality:  Sudden  Timing:  Constant  Progression:  Resolved  Chronicity:  New  Notable PTA blood pressures:  135/100  Context: stress    Relieved by: Klonopin  Worsened by:  Nothing  Ineffective treatments:  None tried  Associated symptoms: anxiety, chest pain, dizziness, nausea and palpitations    Associated symptoms: no abdominal pain, no blurred vision, no confusion, no fatigue, no fever, no headaches, no loss of consciousness, no shortness of breath, no syncope, not vomiting and no weakness        Prior to Admission Medications   Prescriptions Last Dose Informant Patient Reported? Taking?    LORazepam (ATIVAN) 1 mg tablet   No No   Sig: Take 0 5 tablets (0 5 mg total) by mouth every 8 (eight) hours as needed for anxiety for up to 15 doses   Patient not taking: Reported on 11/25/2019   clonazePAM (KlonoPIN) 0 5 mg tablet   No No   Sig: Take 1 tablet (0 5 mg total) by mouth 2 (two) times a day as needed for anxiety   Patient not taking: Reported on 11/25/2019   ibuprofen (MOTRIN) 400 mg tablet   No No   Sig: Take 1 tablet (400 mg total) by mouth every 6 (six) hours as needed for mild pain for up to 4 days   ibuprofen (MOTRIN) 400 mg tablet   No No   Sig: Take 1 tablet (400 mg total) by mouth every 6 (six) hours as needed for headaches for up to 30 doses   Patient not taking: Reported on 11/25/2019   norgestimate-ethinyl estradiol (ORTHO TRI-CYCLEN LO) 0 18/0 215/0 25 MG-25 MCG per tablet   Yes No   Sig: Take 1 tablet by mouth daily   ondansetron (ZOFRAN-ODT) 4 mg disintegrating tablet   No No   Sig: Take 1 tablet (4 mg total) by mouth every 6 (six) hours as needed for nausea or vomiting for up to 15 doses   Patient not taking: Reported on 11/25/2019      Facility-Administered Medications: None       Past Medical History:   Diagnosis Date    Known health problems: none     Mitral valve prolapse     Psychiatric disorder     anxiety, depression    Raynaud's disease        Past Surgical History:   Procedure Laterality Date    VULVA SURGERY         History reviewed  No pertinent family history  I have reviewed and agree with the history as documented  Social History     Tobacco Use    Smoking status: Never Smoker    Smokeless tobacco: Never Used   Substance Use Topics    Alcohol use: Not Currently     Comment: "socially"    Drug use: Not Currently     Comment: hasn't used in a few months        Review of Systems   Constitutional: Negative for chills, fatigue and fever  Eyes: Negative for blurred vision  Respiratory: Negative for apnea and shortness of breath      Cardiovascular: Positive for chest pain and palpitations  Negative for syncope  Gastrointestinal: Positive for nausea  Negative for abdominal pain and vomiting  Skin: Negative for color change  Allergic/Immunologic: Negative for immunocompromised state  Neurological: Positive for dizziness and light-headedness  Negative for loss of consciousness, syncope, weakness and headaches  Psychiatric/Behavioral: Negative for confusion  The patient is nervous/anxious  All other systems reviewed and are negative  Physical Exam  Physical Exam   Constitutional: She appears well-developed and well-nourished  No distress  Nontoxic appearing   HENT:   Head: Normocephalic and atraumatic  Right Ear: External ear normal    Left Ear: External ear normal    Nose: Nose normal    Mouth/Throat: Oropharynx is clear and moist    Eyes: Conjunctivae are normal  Right eye exhibits no discharge  Left eye exhibits no discharge  No scleral icterus  Neck: Normal range of motion  Neck supple  Cardiovascular: Normal rate, regular rhythm and normal heart sounds  No murmur heard  Pulmonary/Chest: Effort normal and breath sounds normal  No stridor  No respiratory distress  She has no wheezes  She has no rales  Abdominal: Soft  Bowel sounds are normal  She exhibits no distension  There is no tenderness  There is no guarding  Musculoskeletal: Normal range of motion  She exhibits no edema or deformity  Lymphadenopathy:     She has no cervical adenopathy  Neurological: She is alert  She is not disoriented  GCS eye subscore is 4  GCS verbal subscore is 5  GCS motor subscore is 6  Skin: Skin is warm and dry  She is not diaphoretic  Psychiatric: Her behavior is normal  Her mood appears anxious  Nursing note and vitals reviewed        Vital Signs  ED Triage Vitals   Temperature Pulse Respirations Blood Pressure SpO2   11/27/19 1526 11/27/19 1526 11/27/19 1526 11/27/19 1526 11/27/19 1526   98 °F (36 7 °C) 103 20 135/87 100 %      Temp Source Heart Rate Source Patient Position - Orthostatic VS BP Location FiO2 (%)   11/27/19 1526 11/27/19 1526 11/27/19 1526 11/27/19 1526 --   Oral Monitor Sitting Left arm       Pain Score       11/27/19 1746       No Pain           Vitals:    11/27/19 1730 11/27/19 1746 11/27/19 1900 11/27/19 1930   BP: 120/72 120/72 107/73 119/71   Pulse: 101 93 75 95   Patient Position - Orthostatic VS:  Lying           Visual Acuity      ED Medications  Medications   ondansetron (ZOFRAN-ODT) dispersible tablet 4 mg (4 mg Oral Given 11/27/19 1906)       Diagnostic Studies  Results Reviewed     Procedure Component Value Units Date/Time    Troponin I [[id number]]  (Normal) Collected:  11/27/19 1911    Lab Status:  Final result Specimen:  Blood from Arm, Left Updated:  11/27/19 1934     Troponin I <0 02 ng/mL     Troponin I [[id number]]  (Normal) Collected:  11/27/19 1606    Lab Status:  Final result Specimen:  Blood from Arm, Left Updated:  11/27/19 1640     Troponin I <0 02 ng/mL     Comprehensive metabolic panel [[id number]] Collected:  11/27/19 1606    Lab Status:  Final result Specimen:  Blood from Arm, Left Updated:  11/27/19 1638     Sodium 141 mmol/L      Potassium 3 8 mmol/L      Chloride 105 mmol/L      CO2 27 mmol/L      ANION GAP 9 mmol/L      BUN 9 mg/dL      Creatinine 0 76 mg/dL      Glucose 105 mg/dL      Calcium 9 6 mg/dL      AST 14 U/L      ALT 19 U/L      Alkaline Phosphatase 46 U/L      Total Protein 7 8 g/dL      Albumin 3 7 g/dL      Total Bilirubin 0 40 mg/dL      eGFR 111 ml/min/1 73sq m     Narrative:       [employer]ega[employer]anth guidelines for Chronic Kidney Disease (CKD):     Stage 1 with normal or high GFR (GFR > 90 mL/min/1 73 square meters)    Stage 2 Mild CKD (GFR = 60-89 mL/min/1 73 square meters)    Stage 3A Moderate CKD (GFR = 45-59 mL/min/1 73 square meters)    Stage 3B Moderate CKD (GFR = 30-44 mL/min/1 73 square meters)    Stage 4 Severe CKD (GFR = 15-29 mL/min/1 73 square meters)    Stage 5 End Stage CKD (GFR <15 mL/min/1 73 square meters)  Note: GFR calculation is accurate only with a steady state creatinine    CBC and differential [[id number]]  (Abnormal) Collected:  11/27/19 1606    Lab Status:  Final result Specimen:  Blood from Arm, Left Updated:  11/27/19 1615     WBC 7 44 Thousand/uL      RBC 4 45 Million/uL      Hemoglobin 14 7 g/dL      Hematocrit 43 0 %      MCV 97 fL      MCH 33 0 pg      MCHC 34 2 g/dL      RDW 12 9 %      MPV 10 0 fL      Platelets 425 Thousands/uL      nRBC 0 /100 WBCs      Neutrophils Relative 77 %      Immat GRANS % 0 %      Lymphocytes Relative 20 %      Monocytes Relative 3 %      Eosinophils Relative 0 %      Basophils Relative 0 %      Neutrophils Absolute 5 65 Thousands/µL      Immature Grans Absolute 0 01 Thousand/uL      Lymphocytes Absolute 1 49 Thousands/µL      Monocytes Absolute 0 25 Thousand/µL      Eosinophils Absolute 0 02 Thousand/µL      Basophils Absolute 0 02 Thousands/µL                  No orders to display              Procedures  ECG 12 Lead Documentation Only  Date/Time: 11/28/2019 9:04 AM  Performed by: Suzy Garcia PA-C  Authorized by: Suzy Garcia PA-C     Indications / Diagnosis:  Chest pain  ECG reviewed by me, the ED Provider: yes    Patient location:  ED  Previous ECG:     Previous ECG:  Compared to current    Comparison ECG info:  11/26/19    Similarity:  No change    Comparison to cardiac monitor: Yes    Interpretation:     Interpretation: abnormal    Rate:     ECG rate:  104    ECG rate assessment: tachycardic    Rhythm:     Rhythm: sinus rhythm    Ectopy:     Ectopy: none    QRS:     QRS axis:  Normal  Conduction:     Conduction: normal    ST segments:     ST segments:  Normal  T waves:     T waves: non-specific and inverted      Inverted:  III (Unchanged from yesterday's EKG)  Comments:      QTc 441    ECG 12 Lead Documentation Only  Date/Time: 11/28/2019 9:08 AM  Performed by: Suzy Garcia PA-C  Authorized by: Tee Hamilton SHEKHAR Newberry     Indications / Diagnosis:  Chest pain  ECG reviewed by me, the ED Provider: yes    Patient location:  ED  Previous ECG:     Previous ECG:  Compared to current    Comparison ECG info:  EKG 3 hours prior     Similarity:  Changes noted (No longer tachycardic)  Interpretation:     Interpretation: abnormal    Rate:     ECG rate:  73    ECG rate assessment: normal    Rhythm:     Rhythm: sinus rhythm    Ectopy:     Ectopy: none    QRS:     QRS axis:  Normal  Conduction:     Conduction: normal    ST segments:     ST segments:  Normal  T waves:     T waves: non-specific    Comments:      Sinus arrhythmia            ED Course               MDM  Number of Diagnoses or Management Options  Anxiety: new and requires workup  Nausea: new and requires workup  Palpitations: new and requires workup  Diagnosis management comments: 24yo female with a PMH of anxiety presenting for evaluation after an episode of diaphoresis, palpitations, and chest pain  This episode feels very similar to her previous episodes that she was seen in the ED for  Patient was discharged earlier this morning  Patient had a stress test done which was negative  Labs reviewed which were all unremarkable  Differential diagnosis includes but is not limited to: anxiety, panic attack, arrhythmia, thyroid dysfunction, electrolyte abnormality, doubt ACS given age and lack of risk factors    Initial ED plan: Cardiac labs including troponin and EKG  Yesterday's labs reviewed  Normal magnesium and TSH level  Patient had a CTA chest which was negative for PE  Further workup is not indicated    Final assessment: Labs overall unremarkable  Electrolytes within normal limits  Initial troponin negative  EKG reveals NSR with non-specific T wave changes which is unchanged from yesterday's EKG  HEART score is 1  Delta troponin and EKG ordered  Repeat troponin is undetectable  Delta EKG is unchanged  Patient re-evaluated and feels improved after Zofran and fluids  Had a long discussion with patient that her symptoms are consistent with anxiety/panic attacks and that she would likely benefit from an SSRI in addition to the PRN Klonopin she is taking  Patient had a scheduled follow-up with her PCP next week and she was advised to keep this  ED return precautions discussed  Patient expressed understanding and is agreeable to plan  Patient discharged in stable condition  Amount and/or Complexity of Data Reviewed  Clinical lab tests: ordered and reviewed  Tests in the radiology section of CPT®: reviewed  Obtain history from someone other than the patient: yes (Boyfriend)  Review and summarize past medical records: yes    Risk of Complications, Morbidity, and/or Mortality  Presenting problems: moderate  Diagnostic procedures: moderate  Management options: moderate    Patient Progress  Patient progress: stable      Disposition  Final diagnoses:   Anxiety   Palpitations   Nausea     Time reflects when diagnosis was documented in both MDM as applicable and the Disposition within this note     Time User Action Codes Description Comment    11/27/2019  7:45  [employer] [place] [F41 9] Anxiety     11/27/2019  7:45  [employer] [place] [R00 2] Palpitations     11/27/2019  7:46  [employer] [place] [R11 0] Nausea       ED Disposition     ED Disposition Condition Date/Time Comment    Discharge Stable Wed Nov 27, 2019  7:45 PM Stuart Diaz discharge to home/self care              Follow-up Information     Follow up With Specialties Details Why Contact Info Additional Information    Go to your scheduled PCP visit next week         [facility] Emergency Department Emergency Medicine  If symptoms worsen [address] [facility] [address] ED, [address] [place] [address], [id number]          Discharge Medication List as of 11/27/2019  7:46 PM      START taking these medications    Details   ondansetron (ZOFRAN) 4 mg tablet Take 1 tablet (4 mg total) by mouth every 6 (six) hours, Starting Wed 11/27/2019, Print         CONTINUE these medications which have NOT CHANGED    Details   clonazePAM (KlonoPIN) 0 5 mg tablet Take 1 tablet (0 5 mg total) by mouth 2 (two) times a day as needed for anxiety, Starting Sun 1/13/2019, Print      ibuprofen (MOTRIN) 400 mg tablet Take 1 tablet (400 mg total) by mouth every 6 (six) hours as needed for headaches for up to 30 doses, Starting Sun 11/24/2019, Print      LORazepam (ATIVAN) 1 mg tablet Take 0 5 tablets (0 5 mg total) by mouth every 8 (eight) hours as needed for anxiety for up to 15 doses, Starting Sun 11/24/2019, Print      norgestimate-ethinyl estradiol (ORTHO TRI-CYCLEN LO) 0 18/0 215/0 25 MG-25 MCG per tablet Take 1 tablet by mouth daily, Historical Med      ondansetron (ZOFRAN-ODT) 4 mg disintegrating tablet Take 1 tablet (4 mg total) by mouth every 6 (six) hours as needed for nausea or vomiting for up to 15 doses, Starting Sun 11/24/2019, Print           No discharge procedures on file      ED Provider  Electronically Signed by           Eri Castro PA-C  11/28/19 1273

## 2019-11-27 NOTE — ASSESSMENT & PLAN NOTE
67-year-old female with history of mitral valve prolapse, anxiety, depression, Raynaud's disease presenting with 2nd episode in the ED for symptoms of palpitations, dyspnea and lightheadedness      -CT chest negative for pulmonary embolism, chest x-ray negative  -prior history of snorting cocaine however has not done this in the past year, UDS negative  -evaluated by Cardiology in the ED recommending echo with stressed which was normal  Patient would benefit from hormonal workup possible Holter monitoring  Patient is new from area recommend workup with local PCP and OBGYN

## 2019-11-27 NOTE — PROGRESS NOTES
Patient started to complain of feeling chest pressure and palpitations  Vitals signs taken, which revealed BP at 133/86 HR 98 O2 at 100 on RA resp 18  EKG performed at this time, showing Sinus Tachycardia with HR at 98 bpm  Assessment, patient is A&O x4, not in any visible distress at this time  On call SLIM called at this time to make aware of situation  Picture of EKG sent to [facility]  Awaiting response back at this time  Jose Malone RN   11/26/19  11:54 PM      =====================================      Per on call SL, cardiology to follow up in the AM  No new orders at this time

## 2019-12-02 ENCOUNTER — HOSPITAL ENCOUNTER (EMERGENCY)
Facility: HOSPITAL | Age: 24
Discharge: HOME | End: 2019-12-02
Payer: COMMERCIAL

## 2019-12-02 VITALS
HEART RATE: 94 BPM | BODY MASS INDEX: 19.23 KG/M2 | TEMPERATURE: 98.3 F | WEIGHT: 101.85 LBS | DIASTOLIC BLOOD PRESSURE: 73 MMHG | HEIGHT: 61 IN | OXYGEN SATURATION: 99 % | RESPIRATION RATE: 18 BRPM | SYSTOLIC BLOOD PRESSURE: 130 MMHG

## 2019-12-02 DIAGNOSIS — S86.912A: Primary | ICD-10-CM

## 2019-12-02 PROCEDURE — 99283 EMERGENCY DEPT VISIT LOW MDM: CPT | Performed by: NURSE PRACTITIONER

## 2019-12-02 PROCEDURE — 99283 EMERGENCY DEPT VISIT LOW MDM: CPT

## 2019-12-02 RX ORDER — NAPROXEN 250 MG/1
250 TABLET ORAL 2 TIMES DAILY WITH MEALS
Qty: 10 TABLET | Refills: 0 | Status: SHIPPED | OUTPATIENT
Start: 2019-12-02 | End: 2020-08-12 | Stop reason: ALTCHOICE

## 2019-12-02 NOTE — ED PROVIDER NOTES
History  Chief Complaint   Patient presents with    Leg Pain     pt c/o leg sided calf pain that started tonight, pt states the pain has been traveling into her foot as well     This is a 28-year-old female who presents here with a chief complaint of left anterior lower extremity/shin area pain  She states that the pain started last evening and has continued  The pain is reproducible palpation of the extensor digitorum longus  Also reproducible with hyper plantar flexion and internal rotation  Also tender over palpation of the muscle body  No distal swelling  Very low risk for deep vein thrombosis  No shortness of breath  Reports that she did participate in a walk" a couple days ago with her boyfriend who is at the bedside  Otherwise no history of DVT either  Prior to Admission Medications   Prescriptions Last Dose Informant Patient Reported? Taking? clonazePAM (KlonoPIN) 0 5 mg tablet   No Yes   Sig: Take 1 tablet (0 5 mg total) by mouth 2 (two) times a day as needed for anxiety   norgestimate-ethinyl estradiol (ORTHO TRI-CYCLEN LO) 0 18/0 215/0 25 MG-25 MCG per tablet   Yes Yes   Sig: Take 1 tablet by mouth daily      Facility-Administered Medications: None       Past Medical History:   Diagnosis Date    Known health problems: none     Mitral valve prolapse     Psychiatric disorder     anxiety, depression    Raynaud's disease        Past Surgical History:   Procedure Laterality Date    VULVA SURGERY         History reviewed  No pertinent family history  I have reviewed and agree with the history as documented  Social History     Tobacco Use    Smoking status: Never Smoker    Smokeless tobacco: Never Used   Substance Use Topics    Alcohol use: Not Currently     Comment: "socially"    Drug use: Not Currently     Comment: hasn't used in a few months        Review of Systems   Constitutional: Negative for activity change, fatigue and fever     HENT: Negative for congestion, ear pain, rhinorrhea and sore throat  Eyes: Negative  Respiratory: Negative for cough, shortness of breath and wheezing  Gastrointestinal: Negative for abdominal pain, diarrhea, nausea and vomiting  Endocrine: Negative  Genitourinary: Negative for difficulty urinating, dyspareunia, dysuria, flank pain, frequency, menstrual problem, pelvic pain, urgency, vaginal bleeding, vaginal discharge and vaginal pain  Musculoskeletal: Positive for gait problem  Negative for arthralgias and myalgias  Skin: Negative for color change and pallor  Neurological: Negative for dizziness, speech difficulty, weakness and headaches  Hematological: Negative for adenopathy  Psychiatric/Behavioral: Negative for confusion  Physical Exam  Physical Exam   Constitutional: She is oriented to person, place, and time  She appears well-developed and well-nourished  She is cooperative  Non-toxic appearance  She does not have a sickly appearance  She does not appear ill  No distress  HENT:   Head: Normocephalic and atraumatic  Right Ear: Tympanic membrane and external ear normal    Left Ear: Tympanic membrane and external ear normal    Nose: No rhinorrhea, sinus tenderness or nasal deformity  No epistaxis  Right sinus exhibits no maxillary sinus tenderness and no frontal sinus tenderness  Left sinus exhibits no maxillary sinus tenderness and no frontal sinus tenderness  Mouth/Throat: Oropharynx is clear and moist and mucous membranes are normal  Normal dentition  Eyes: Pupils are equal, round, and reactive to light  EOM are normal    Neck: Normal range of motion  Neck supple  Cardiovascular: Normal rate, regular rhythm and normal heart sounds  No murmur heard  Pulmonary/Chest: Effort normal and breath sounds normal  No accessory muscle usage  No respiratory distress  She has no wheezes  She has no rales  She exhibits no tenderness  Abdominal: Soft  She exhibits no distension  There is no guarding     Musculoskeletal: Normal range of motion  She exhibits no edema  Left lower leg: She exhibits tenderness  She exhibits no swelling and no edema  Legs:  Lymphadenopathy:     She has no cervical adenopathy  Neurological: She is alert and oriented to person, place, and time  She exhibits normal muscle tone  Skin: Skin is warm and dry  No rash noted  No erythema  Psychiatric: She has a normal mood and affect  Nursing note and vitals reviewed  Vital Signs  ED Triage Vitals [12/02/19 0621]   Temperature Pulse Respirations Blood Pressure SpO2   98 3 °F (36 8 °C) 94 18 130/73 99 %      Temp Source Heart Rate Source Patient Position - Orthostatic VS BP Location FiO2 (%)   Oral Monitor Sitting Right arm --      Pain Score       4           Vitals:    12/02/19 0621   BP: 130/73   Pulse: 94   Patient Position - Orthostatic VS: Sitting         Visual Acuity      ED Medications  Medications - No data to display    Diagnostic Studies  Results Reviewed     None                 No orders to display              Procedures  Procedures       ED Course                               MDM  Number of Diagnoses or Management Options  Lower extremity tendon strain, left, initial encounter: new and requires workup  Diagnosis management comments: Pain distribution is not contiguous with deep veins of the lower extremity  Recent activity and slow onset of tenderness is suggestive of muscle strain/tendinitis         Amount and/or Complexity of Data Reviewed  Independent visualization of images, tracings, or specimens: yes    Patient Progress  Patient progress: stable      Disposition  Final diagnoses:   Lower extremity tendon strain, left, initial encounter - Extensor digitorum longus     Time reflects when diagnosis was documented in both MDM as applicable and the Disposition within this note     Time User Action Codes Description Comment    12/2/2019  6:47 AM Romel Liberty Add [L67 737U] Lower extremity tendon strain, left, initial encounter     12/2/2019  6:47 AM Daily Martínez Modify [F63 443N] Lower extremity tendon strain, left, initial encounter Extensor digitorum longus      ED Disposition     ED Disposition Condition Date/Time Comment    Discharge Stable Mon Dec 2, 2019  6:46 AM Va Purcellisabell discharge to home/self care  Follow-up Information    None         Discharge Medication List as of 12/2/2019  6:55 AM      START taking these medications    Details   naproxen (NAPROSYN) 250 mg tablet Take 1 tablet (250 mg total) by mouth 2 (two) times a day with meals for 5 days, Starting Mon 12/2/2019, Until Sat 12/7/2019, Print         CONTINUE these medications which have NOT CHANGED    Details   clonazePAM (KlonoPIN) 0 5 mg tablet Take 1 tablet (0 5 mg total) by mouth 2 (two) times a day as needed for anxiety, Starting Sun 1/13/2019, Print      norgestimate-ethinyl estradiol (ORTHO TRI-CYCLEN LO) 0 18/0 215/0 25 MG-25 MCG per tablet Take 1 tablet by mouth daily, Historical Med           No discharge procedures on file      ED Provider  Electronically Signed by           SETH Mittal  12/02/19 042

## 2020-01-27 ENCOUNTER — APPOINTMENT (EMERGENCY)
Dept: RADIOLOGY | Facility: HOSPITAL | Age: 25
End: 2020-01-27
Payer: COMMERCIAL

## 2020-01-27 ENCOUNTER — HOSPITAL ENCOUNTER (EMERGENCY)
Facility: HOSPITAL | Age: 25
Discharge: HOME | End: 2020-01-27
Attending: EMERGENCY MEDICINE | Admitting: EMERGENCY MEDICINE
Payer: COMMERCIAL

## 2020-01-27 VITALS
BODY MASS INDEX: 18.44 KG/M2 | OXYGEN SATURATION: 99 % | SYSTOLIC BLOOD PRESSURE: 117 MMHG | RESPIRATION RATE: 18 BRPM | DIASTOLIC BLOOD PRESSURE: 74 MMHG | WEIGHT: 97.66 LBS | HEIGHT: 61 IN | TEMPERATURE: 98.8 F | HEART RATE: 82 BPM

## 2020-01-27 DIAGNOSIS — T88.7XXA MEDICATION SIDE EFFECT: Primary | ICD-10-CM

## 2020-01-27 LAB
ALBUMIN SERPL BCP-MCNC: 3.8 G/DL (ref 3.5–5)
ALP SERPL-CCNC: 57 U/L (ref 46–116)
ALT SERPL W P-5'-P-CCNC: 25 U/L (ref 12–78)
ANION GAP SERPL CALCULATED.3IONS-SCNC: 9 MMOL/L (ref 4–13)
AST SERPL W P-5'-P-CCNC: 22 U/L (ref 5–45)
ATRIAL RATE: 88 BPM
BASOPHILS # BLD AUTO: 0.02 THOUSANDS/ΜL (ref 0–0.1)
BASOPHILS NFR BLD AUTO: 0 % (ref 0–1)
BILIRUB SERPL-MCNC: 0.2 MG/DL (ref 0.2–1)
BUN SERPL-MCNC: 12 MG/DL (ref 5–25)
CALCIUM SERPL-MCNC: 9.3 MG/DL (ref 8.3–10.1)
CHLORIDE SERPL-SCNC: 103 MMOL/L (ref 100–108)
CO2 SERPL-SCNC: 29 MMOL/L (ref 21–32)
CREAT SERPL-MCNC: 0.69 MG/DL (ref 0.6–1.3)
EOSINOPHIL # BLD AUTO: 0.03 THOUSAND/ΜL (ref 0–0.61)
EOSINOPHIL NFR BLD AUTO: 1 % (ref 0–6)
ERYTHROCYTE [DISTWIDTH] IN BLOOD BY AUTOMATED COUNT: 12.1 % (ref 11.6–15.1)
GFR SERPL CREATININE-BSD FRML MDRD: 123 ML/MIN/1.73SQ M
GLUCOSE SERPL-MCNC: 112 MG/DL (ref 65–140)
HCT VFR BLD AUTO: 45.2 % (ref 34.8–46.1)
HGB BLD-MCNC: 15.6 G/DL (ref 11.5–15.4)
IMM GRANULOCYTES # BLD AUTO: 0.01 THOUSAND/UL (ref 0–0.2)
IMM GRANULOCYTES NFR BLD AUTO: 0 % (ref 0–2)
LYMPHOCYTES # BLD AUTO: 2.14 THOUSANDS/ΜL (ref 0.6–4.47)
LYMPHOCYTES NFR BLD AUTO: 39 % (ref 14–44)
MCH RBC QN AUTO: 32.8 PG (ref 26.8–34.3)
MCHC RBC AUTO-ENTMCNC: 34.5 G/DL (ref 31.4–37.4)
MCV RBC AUTO: 95 FL (ref 82–98)
MONOCYTES # BLD AUTO: 0.49 THOUSAND/ΜL (ref 0.17–1.22)
MONOCYTES NFR BLD AUTO: 9 % (ref 4–12)
NEUTROPHILS # BLD AUTO: 2.83 THOUSANDS/ΜL (ref 1.85–7.62)
NEUTS SEG NFR BLD AUTO: 51 % (ref 43–75)
NRBC BLD AUTO-RTO: 0 /100 WBCS
P AXIS: 66 DEGREES
PLATELET # BLD AUTO: 261 THOUSANDS/UL (ref 149–390)
PMV BLD AUTO: 10.2 FL (ref 8.9–12.7)
POTASSIUM SERPL-SCNC: 3.5 MMOL/L (ref 3.5–5.3)
PR INTERVAL: 110 MS
PROT SERPL-MCNC: 8.1 G/DL (ref 6.4–8.2)
QRS AXIS: 83 DEGREES
QRSD INTERVAL: 80 MS
QT INTERVAL: 386 MS
QTC INTERVAL: 467 MS
RBC # BLD AUTO: 4.76 MILLION/UL (ref 3.81–5.12)
SODIUM SERPL-SCNC: 141 MMOL/L (ref 136–145)
T WAVE AXIS: 7 DEGREES
TROPONIN I SERPL-MCNC: <0.02 NG/ML
VENTRICULAR RATE: 88 BPM
WBC # BLD AUTO: 5.52 THOUSAND/UL (ref 4.31–10.16)

## 2020-01-27 PROCEDURE — 99284 EMERGENCY DEPT VISIT MOD MDM: CPT | Performed by: EMERGENCY MEDICINE

## 2020-01-27 PROCEDURE — 93005 ELECTROCARDIOGRAM TRACING: CPT

## 2020-01-27 PROCEDURE — 99285 EMERGENCY DEPT VISIT HI MDM: CPT

## 2020-01-27 PROCEDURE — 93010 ELECTROCARDIOGRAM REPORT: CPT | Performed by: INTERNAL MEDICINE

## 2020-01-27 PROCEDURE — 85025 COMPLETE CBC W/AUTO DIFF WBC: CPT | Performed by: EMERGENCY MEDICINE

## 2020-01-27 PROCEDURE — 80053 COMPREHEN METABOLIC PANEL: CPT | Performed by: EMERGENCY MEDICINE

## 2020-01-27 PROCEDURE — 71046 X-RAY EXAM CHEST 2 VIEWS: CPT

## 2020-01-27 PROCEDURE — 84484 ASSAY OF TROPONIN QUANT: CPT | Performed by: EMERGENCY MEDICINE

## 2020-01-27 PROCEDURE — 36415 COLL VENOUS BLD VENIPUNCTURE: CPT | Performed by: EMERGENCY MEDICINE

## 2020-01-27 RX ORDER — HYDROXYZINE HYDROCHLORIDE 25 MG/1
25 TABLET, FILM COATED ORAL ONCE
Status: DISCONTINUED | OUTPATIENT
Start: 2020-01-27 | End: 2020-01-27

## 2020-01-27 NOTE — DISCHARGE INSTRUCTIONS
Do not take the decongestant again as you are particularly sensitive to its side effects  If you have worsening palpitations, chest pain return to ED   Follow up with your doctor for recheck as needed

## 2020-01-27 NOTE — ED PROVIDER NOTES
History  Chief Complaint   Patient presents with    Palpitations     Pt took decongestant (PE Sudafed) at 2100 last night, started to experience high bp, palpitations, chest pain, abd pain, reports symptoms have subside  Pt reports feeling jittery  HPI  21 yo F with PMH anxiety presents with palpitations  She took sudafed at 2100 last night and started to feel anxious  She felt her heart racing with chest pain and elevated blood pressure  She states that she also had cramping abdominal pain and diarrhea  Symptoms have resolved except for mild residual anxiety  Patient states that she is weaning off Klonopin right now for her anxiety  She is currently taking an SSRI  Prior to Admission Medications   Prescriptions Last Dose Informant Patient Reported? Taking? clonazePAM (KlonoPIN) 0 5 mg tablet   No No   Sig: Take 1 tablet (0 5 mg total) by mouth 2 (two) times a day as needed for anxiety   naproxen (NAPROSYN) 250 mg tablet   No No   Sig: Take 1 tablet (250 mg total) by mouth 2 (two) times a day with meals for 5 days   norgestimate-ethinyl estradiol (ORTHO TRI-CYCLEN LO) 0 18/0 215/0 25 MG-25 MCG per tablet   Yes No   Sig: Take 1 tablet by mouth daily      Facility-Administered Medications: None       Past Medical History:   Diagnosis Date    Known health problems: none     Mitral valve prolapse     Psychiatric disorder     anxiety, depression    Raynaud's disease        Past Surgical History:   Procedure Laterality Date    VULVA SURGERY         History reviewed  No pertinent family history  I have reviewed and agree with the history as documented  Social History     Tobacco Use    Smoking status: Never Smoker    Smokeless tobacco: Never Used   Substance Use Topics    Alcohol use: Not Currently     Comment: "socially"    Drug use: Not Currently     Comment: hasn't used in a few months        Review of Systems   Constitutional: Negative for chills and fever     HENT: Negative for dental problem and ear pain  Eyes: Negative for pain and redness  Respiratory: Negative for cough and shortness of breath  Cardiovascular: Positive for chest pain and palpitations  Gastrointestinal: Positive for abdominal pain and diarrhea  Negative for nausea  Endocrine: Negative for polydipsia and polyphagia  Genitourinary: Negative for dysuria and frequency  Musculoskeletal: Negative for arthralgias and joint swelling  Skin: Negative for color change and rash  Neurological: Negative for dizziness and headaches  Psychiatric/Behavioral: Negative for behavioral problems and confusion  The patient is nervous/anxious  All other systems reviewed and are negative  Physical Exam  Physical Exam   Constitutional: She is oriented to person, place, and time  She appears well-developed and well-nourished  No distress  HENT:   Head: Atraumatic  Right Ear: External ear normal    Left Ear: External ear normal    Nose: Nose normal    Eyes: Pupils are equal, round, and reactive to light  Conjunctivae and EOM are normal    Neck: Normal range of motion  Neck supple  No JVD present  Cardiovascular: Normal rate, regular rhythm and normal heart sounds  No murmur heard  Pulmonary/Chest: Effort normal and breath sounds normal  No respiratory distress  She has no wheezes  Abdominal: Soft  Bowel sounds are normal  She exhibits no distension  There is no tenderness  Musculoskeletal: Normal range of motion  She exhibits no edema  Neurological: She is alert and oriented to person, place, and time  No cranial nerve deficit  Skin: Skin is warm and dry  Capillary refill takes less than 2 seconds  She is not diaphoretic  Psychiatric: She has a normal mood and affect  Her behavior is normal    Nursing note and vitals reviewed        Vital Signs  ED Triage Vitals [01/27/20 0307]   Temperature Pulse Respirations Blood Pressure SpO2   98 8 °F (37 1 °C) 84 18 121/82 98 %      Temp Source Heart Rate Source Patient Position - Orthostatic VS BP Location FiO2 (%)   Oral Monitor Sitting Right arm --      Pain Score       2           Vitals:    01/27/20 0307   BP: 121/82   Pulse: 84   Patient Position - Orthostatic VS: Sitting         Visual Acuity      ED Medications  Medications - No data to display    Diagnostic Studies  Results Reviewed     Procedure Component Value Units Date/Time    Troponin I [[id number]]  (Normal) Collected:  01/27/20 0338    Lab Status:  Final result Specimen:  Blood from Arm, Right Updated:  01/27/20 0404     Troponin I <0 02 ng/mL     Comprehensive metabolic panel [[id number]] Collected:  01/27/20 0338    Lab Status:  Final result Specimen:  Blood from Arm, Right Updated:  01/27/20 0401     Sodium 141 mmol/L      Potassium 3 5 mmol/L      Chloride 103 mmol/L      CO2 29 mmol/L      ANION GAP 9 mmol/L      BUN 12 mg/dL      Creatinine 0 69 mg/dL      Glucose 112 mg/dL      Calcium 9 3 mg/dL      AST 22 U/L      ALT 25 U/L      Alkaline Phosphatase 57 U/L      Total Protein 8 1 g/dL      Albumin 3 8 g/dL      Total Bilirubin 0 20 mg/dL      eGFR 123 ml/min/1 73sq m     Narrative:       [facility] guidelines for Chronic Kid[facility] Disease (CKD):     Stage 1 with normal or high GFR (GFR > 90 mL/min/1 73 square meters)    Stage 2 Mild CKD (GFR = 60-89 mL/min/1 73 square meters)    Stage 3A Moderate CKD (GFR = 45-59 mL/min/1 73 square meters)    Stage 3B Moderate CKD (GFR = 30-44 mL/min/1 73 square meters)    Stage 4 Severe CKD (GFR = 15-29 mL/min/1 73 square meters)    Stage 5 End Stage CKD (GFR <15 mL/min/1 73 square meters)  Note: GFR calculation is accurate only with a steady state creatinine    CBC and differential [[id number]]  (Abnormal) Collected:  01/27/20 0338    Lab Status:  Final result Specimen:  Blood from Arm, Right Updated:  01/27/20 0346     WBC 5 52 Thousand/uL      RBC 4 76 Million/uL      Hemoglobin 15 6 g/dL      Hematocrit 45 2 %      MCV 95 fL      MCH 32 8 pg MCHC 34 5 g/dL      RDW 12 1 %      MPV 10 2 fL      Platelets 634 Thousands/uL      nRBC 0 /100 WBCs      Neutrophils Relative 51 %      Immat GRANS % 0 %      Lymphocytes Relative 39 %      Monocytes Relative 9 %      Eosinophils Relative 1 %      Basophils Relative 0 %      Neutrophils Absolute 2 83 Thousands/µL      Immature Grans Absolute 0 01 Thousand/uL      Lymphocytes Absolute 2 14 Thousands/µL      Monocytes Absolute 0 49 Thousand/µL      Eosinophils Absolute 0 03 Thousand/µL      Basophils Absolute 0 02 Thousands/µL                  XR chest 2 views    (Results Pending)              Procedures  ECG 12 Lead Documentation Only  Date/Time: 1/27/2020 3:17 AM  Performed by: Ofelia Whiteside MD  Authorized by: Ofelia Whiteside MD     Comments:      Normal sinus rhythm rate of 88, normal axis and intervals, no acute ST elevations or depressions             ED Course                               MDM    Patient presents with palpitations, chest pain, anxiety, abdominal cramping after taking Sudafed  She states symptoms have been resolving, while in the ED she feels back to baseline  Chest x-ray unremarkable, EKG and trop negative, no signs for ACS, labs otherwise look normal   This is likely a medication side effect and I recommended that she not take this again especially since she is prone to anxiety  Disposition  Final diagnoses:   Medication side effect     Time reflects when diagnosis was documented in both MDM as applicable and the Disposition within this note     Time User Action Codes Description Comment    1/27/2020  4:07 AM Erica Rojas  7XXA] Medication side effect       ED Disposition     ED Disposition Condition Date/Time Comment    Discharge Stable Mon Jan 27, 2020  4:07 AM Mirella James discharge to home/self care              Follow-up Information     Follow up With Specialties Details Why Contact Info Additional Information    [address] Emergency Medicine  As needed [address] [facility] [id number]  [address] [place] [address] ED, [address] [place], [place], [address]          Patient's Medications   Discharge Prescriptions    No medications on file     No discharge procedures on file      ED Provider  Electronically Signed by           Earnestine Benavides MD  01/27/20 1908

## 2020-04-25 ENCOUNTER — HOSPITAL ENCOUNTER (EMERGENCY)
Facility: HOSPITAL | Age: 25
Discharge: HOME | End: 2020-04-25
Attending: EMERGENCY MEDICINE | Admitting: EMERGENCY MEDICINE
Payer: COMMERCIAL

## 2020-04-25 VITALS
RESPIRATION RATE: 17 BRPM | TEMPERATURE: 98.2 F | WEIGHT: 97 LBS | SYSTOLIC BLOOD PRESSURE: 102 MMHG | OXYGEN SATURATION: 97 % | DIASTOLIC BLOOD PRESSURE: 67 MMHG | BODY MASS INDEX: 18.31 KG/M2 | HEIGHT: 61 IN | HEART RATE: 70 BPM

## 2020-04-25 DIAGNOSIS — E87.6 HYPOKALEMIA: ICD-10-CM

## 2020-04-25 DIAGNOSIS — R00.2 PALPITATIONS: Primary | ICD-10-CM

## 2020-04-25 LAB
ANION GAP SERPL CALCULATED.3IONS-SCNC: 9 MMOL/L (ref 4–13)
ATRIAL RATE: 77 BPM
BASOPHILS # BLD AUTO: 0.03 THOUSANDS/ΜL (ref 0–0.1)
BASOPHILS NFR BLD AUTO: 0 % (ref 0–1)
BUN SERPL-MCNC: 14 MG/DL (ref 5–25)
CALCIUM SERPL-MCNC: 9.1 MG/DL (ref 8.3–10.1)
CHLORIDE SERPL-SCNC: 102 MMOL/L (ref 100–108)
CO2 SERPL-SCNC: 28 MMOL/L (ref 21–32)
CREAT SERPL-MCNC: 0.62 MG/DL (ref 0.6–1.3)
EOSINOPHIL # BLD AUTO: 0.08 THOUSAND/ΜL (ref 0–0.61)
EOSINOPHIL NFR BLD AUTO: 1 % (ref 0–6)
ERYTHROCYTE [DISTWIDTH] IN BLOOD BY AUTOMATED COUNT: 12.2 % (ref 11.6–15.1)
GFR SERPL CREATININE-BSD FRML MDRD: 128 ML/MIN/1.73SQ M
GLUCOSE SERPL-MCNC: 92 MG/DL (ref 65–140)
HCT VFR BLD AUTO: 42.5 % (ref 34.8–46.1)
HGB BLD-MCNC: 14.5 G/DL (ref 11.5–15.4)
IMM GRANULOCYTES # BLD AUTO: 0.01 THOUSAND/UL (ref 0–0.2)
IMM GRANULOCYTES NFR BLD AUTO: 0 % (ref 0–2)
LYMPHOCYTES # BLD AUTO: 3.5 THOUSANDS/ΜL (ref 0.6–4.47)
LYMPHOCYTES NFR BLD AUTO: 52 % (ref 14–44)
MCH RBC QN AUTO: 32.4 PG (ref 26.8–34.3)
MCHC RBC AUTO-ENTMCNC: 34.1 G/DL (ref 31.4–37.4)
MCV RBC AUTO: 95 FL (ref 82–98)
MONOCYTES # BLD AUTO: 0.51 THOUSAND/ΜL (ref 0.17–1.22)
MONOCYTES NFR BLD AUTO: 7 % (ref 4–12)
NEUTROPHILS # BLD AUTO: 2.79 THOUSANDS/ΜL (ref 1.85–7.62)
NEUTS SEG NFR BLD AUTO: 40 % (ref 43–75)
NRBC BLD AUTO-RTO: 0 /100 WBCS
P AXIS: 65 DEGREES
PLATELET # BLD AUTO: 224 THOUSANDS/UL (ref 149–390)
PMV BLD AUTO: 10.4 FL (ref 8.9–12.7)
POTASSIUM SERPL-SCNC: 3.2 MMOL/L (ref 3.5–5.3)
PR INTERVAL: 118 MS
QRS AXIS: 79 DEGREES
QRSD INTERVAL: 82 MS
QT INTERVAL: 378 MS
QTC INTERVAL: 427 MS
RBC # BLD AUTO: 4.48 MILLION/UL (ref 3.81–5.12)
SODIUM SERPL-SCNC: 139 MMOL/L (ref 136–145)
T WAVE AXIS: 26 DEGREES
VENTRICULAR RATE: 77 BPM
WBC # BLD AUTO: 6.92 THOUSAND/UL (ref 4.31–10.16)

## 2020-04-25 PROCEDURE — 93010 ELECTROCARDIOGRAM REPORT: CPT | Performed by: INTERNAL MEDICINE

## 2020-04-25 PROCEDURE — 80048 BASIC METABOLIC PNL TOTAL CA: CPT | Performed by: NURSE PRACTITIONER

## 2020-04-25 PROCEDURE — 99285 EMERGENCY DEPT VISIT HI MDM: CPT

## 2020-04-25 PROCEDURE — 36415 COLL VENOUS BLD VENIPUNCTURE: CPT | Performed by: NURSE PRACTITIONER

## 2020-04-25 PROCEDURE — 99285 EMERGENCY DEPT VISIT HI MDM: CPT | Performed by: NURSE PRACTITIONER

## 2020-04-25 PROCEDURE — 85025 COMPLETE CBC W/AUTO DIFF WBC: CPT | Performed by: NURSE PRACTITIONER

## 2020-04-25 PROCEDURE — 93005 ELECTROCARDIOGRAM TRACING: CPT

## 2020-04-25 RX ORDER — POTASSIUM CHLORIDE 20 MEQ/1
40 TABLET, EXTENDED RELEASE ORAL ONCE
Status: COMPLETED | OUTPATIENT
Start: 2020-04-25 | End: 2020-04-25

## 2020-04-25 RX ADMIN — POTASSIUM CHLORIDE 40 MEQ: 1500 TABLET, EXTENDED RELEASE ORAL at 10:22

## 2020-06-25 ENCOUNTER — HOSPITAL ENCOUNTER (EMERGENCY)
Facility: HOSPITAL | Age: 25
Discharge: HOME | End: 2020-06-25
Attending: EMERGENCY MEDICINE | Admitting: EMERGENCY MEDICINE
Payer: COMMERCIAL

## 2020-06-25 VITALS
SYSTOLIC BLOOD PRESSURE: 121 MMHG | DIASTOLIC BLOOD PRESSURE: 74 MMHG | TEMPERATURE: 99 F | OXYGEN SATURATION: 100 % | HEIGHT: 61 IN | RESPIRATION RATE: 16 BRPM | BODY MASS INDEX: 19.52 KG/M2 | HEART RATE: 92 BPM | WEIGHT: 103.4 LBS

## 2020-06-25 DIAGNOSIS — R51.9 HEADACHE: Primary | ICD-10-CM

## 2020-06-25 LAB
EXT PREG TEST URINE: NEGATIVE
EXT. CONTROL ED NAV: NORMAL

## 2020-06-25 PROCEDURE — 81025 URINE PREGNANCY TEST: CPT | Performed by: EMERGENCY MEDICINE

## 2020-06-25 PROCEDURE — 96375 TX/PRO/DX INJ NEW DRUG ADDON: CPT

## 2020-06-25 PROCEDURE — 99283 EMERGENCY DEPT VISIT LOW MDM: CPT

## 2020-06-25 PROCEDURE — 96361 HYDRATE IV INFUSION ADD-ON: CPT

## 2020-06-25 PROCEDURE — 99282 EMERGENCY DEPT VISIT SF MDM: CPT | Performed by: EMERGENCY MEDICINE

## 2020-06-25 PROCEDURE — 96365 THER/PROPH/DIAG IV INF INIT: CPT

## 2020-06-25 RX ORDER — METOCLOPRAMIDE HYDROCHLORIDE 5 MG/ML
10 INJECTION INTRAMUSCULAR; INTRAVENOUS ONCE
Status: DISCONTINUED | OUTPATIENT
Start: 2020-06-25 | End: 2020-06-25

## 2020-06-25 RX ORDER — ACETAMINOPHEN 325 MG/1
975 TABLET ORAL ONCE
Status: COMPLETED | OUTPATIENT
Start: 2020-06-25 | End: 2020-06-25

## 2020-06-25 RX ORDER — MAGNESIUM SULFATE 1 G/100ML
1 INJECTION INTRAVENOUS ONCE
Status: COMPLETED | OUTPATIENT
Start: 2020-06-25 | End: 2020-06-25

## 2020-06-25 RX ORDER — DIPHENHYDRAMINE HYDROCHLORIDE 50 MG/ML
25 INJECTION INTRAMUSCULAR; INTRAVENOUS ONCE
Status: DISCONTINUED | OUTPATIENT
Start: 2020-06-25 | End: 2020-06-25

## 2020-06-25 RX ORDER — KETOROLAC TROMETHAMINE 30 MG/ML
15 INJECTION, SOLUTION INTRAMUSCULAR; INTRAVENOUS ONCE
Status: COMPLETED | OUTPATIENT
Start: 2020-06-25 | End: 2020-06-25

## 2020-06-25 RX ADMIN — ACETAMINOPHEN 975 MG: 325 TABLET, FILM COATED ORAL at 05:27

## 2020-06-25 RX ADMIN — MAGNESIUM SULFATE HEPTAHYDRATE 1 G: 1 INJECTION, SOLUTION INTRAVENOUS at 05:31

## 2020-06-25 RX ADMIN — KETOROLAC TROMETHAMINE 15 MG: 30 INJECTION, SOLUTION INTRAMUSCULAR at 05:23

## 2020-06-25 RX ADMIN — SODIUM CHLORIDE 1000 ML: 0.9 INJECTION, SOLUTION INTRAVENOUS at 04:54

## 2020-08-07 ENCOUNTER — OFFICE VISIT (OUTPATIENT)
Dept: URGENT CARE | Facility: CLINIC | Age: 25
End: 2020-08-07
Payer: COMMERCIAL

## 2020-08-07 VITALS
DIASTOLIC BLOOD PRESSURE: 72 MMHG | OXYGEN SATURATION: 98 % | SYSTOLIC BLOOD PRESSURE: 100 MMHG | RESPIRATION RATE: 18 BRPM | TEMPERATURE: 99.1 F | HEART RATE: 87 BPM | BODY MASS INDEX: 19.45 KG/M2 | WEIGHT: 103 LBS | HEIGHT: 61 IN

## 2020-08-07 DIAGNOSIS — R10.9 ABDOMINAL PAIN, UNSPECIFIED ABDOMINAL LOCATION: Primary | ICD-10-CM

## 2020-08-07 LAB
SL AMB  POCT GLUCOSE, UA: ABNORMAL
SL AMB LEUKOCYTE ESTERASE,UA: ABNORMAL
SL AMB POCT BILIRUBIN,UA: ABNORMAL
SL AMB POCT BLOOD,UA: ABNORMAL
SL AMB POCT CLARITY,UA: CLEAR
SL AMB POCT COLOR,UA: YELLOW
SL AMB POCT KETONES,UA: ABNORMAL
SL AMB POCT NITRITE,UA: ABNORMAL
SL AMB POCT PH,UA: 5
SL AMB POCT SPECIFIC GRAVITY,UA: 1
SL AMB POCT URINE HCG: NORMAL
SL AMB POCT URINE PROTEIN: ABNORMAL
SL AMB POCT UROBILINOGEN: 0.2

## 2020-08-07 PROCEDURE — 81025 URINE PREGNANCY TEST: CPT | Performed by: PHYSICIAN ASSISTANT

## 2020-08-07 PROCEDURE — 99213 OFFICE O/P EST LOW 20 MIN: CPT | Performed by: PHYSICIAN ASSISTANT

## 2020-08-07 PROCEDURE — 87086 URINE CULTURE/COLONY COUNT: CPT | Performed by: PHYSICIAN ASSISTANT

## 2020-08-07 PROCEDURE — 81002 URINALYSIS NONAUTO W/O SCOPE: CPT | Performed by: PHYSICIAN ASSISTANT

## 2020-08-07 RX ORDER — CEPHALEXIN 500 MG/1
500 CAPSULE ORAL EVERY 6 HOURS SCHEDULED
Qty: 28 CAPSULE | Refills: 0 | Status: SHIPPED | OUTPATIENT
Start: 2020-08-07 | End: 2020-08-12 | Stop reason: ALTCHOICE

## 2020-08-07 RX ORDER — ONDANSETRON 4 MG/1
4 TABLET, FILM COATED ORAL EVERY 8 HOURS PRN
Qty: 20 TABLET | Refills: 0 | Status: SHIPPED | OUTPATIENT
Start: 2020-08-07 | End: 2020-08-12 | Stop reason: ALTCHOICE

## 2020-08-07 RX ORDER — SUCRALFATE 1 G/1
1 TABLET ORAL 4 TIMES DAILY
Qty: 20 TABLET | Refills: 0 | Status: SHIPPED | OUTPATIENT
Start: 2020-08-07 | End: 2021-02-08 | Stop reason: HOSPADM

## 2020-08-07 NOTE — PROGRESS NOTES
[facility]        NAME: Ernesto Plunkett is a 22 y o  female  : 1995    MRN: [id number]  DATE: 2020  TIME: 6:43 PM    Assessment and Plan   Abdominal pain, unspecified abdominal location [R10 9]  1  Abdominal pain, unspecified abdominal location  sucralfate (CARAFATE) 1 g tablet    ondansetron (ZOFRAN) 4 mg tablet    POCT urine dip    POCT urine HCG         Patient Instructions     Carafate and zofran as prescribed  Recommend pepcid 20mg tablets over the counter  [place] diet as directed  If any worsening abdominal pain or fever proceed to ER    Antibiotics as directed for UTI, will send for culture and follow up if any changes need to be made    Follow up with PCP in 3-5 days  Proceed to  ER if symptoms worsen  Chief Complaint     Chief Complaint   Patient presents with    Abdominal Pain     Pt c/o stomach pains, tightness, nausea and bodyaches, no vomiting no diarrha x 3 days  History of Present Illness       78-year-old female presents for evaluation of abdominal pain, nausea and body aches  She states she has had symptoms for 3 days  She complains of burning in crampy abdominal pain  She associates nausea with the abdominal pain  States she has not had any episodes of vomiting or diarrhea  She has no history of PUD  Patient denies any urinary frequency or dysuria at this time  She denies flank pain  Denies chest pain  Denies cough or shortness of breath  Denies recent travel  Review of Systems   Review of Systems   Constitutional: Negative for chills, fatigue and fever  HENT: Negative for congestion, ear pain, sinus pain, sore throat and trouble swallowing  Eyes: Negative for pain, discharge and redness  Respiratory: Negative for cough, chest tightness, shortness of breath and wheezing  Cardiovascular: Negative for chest pain, palpitations and leg swelling  Gastrointestinal: Positive for abdominal pain, diarrhea and nausea  Negative for vomiting  Musculoskeletal: Positive for myalgias  Negative for arthralgias and joint swelling  Skin: Negative for rash  Neurological: Negative for dizziness, weakness, numbness and headaches  Current Medications       Current Outpatient Medications:     clonazePAM (KlonoPIN) 0 5 mg tablet, Take 1 tablet (0 5 mg total) by mouth 2 (two) times a day as needed for anxiety (Patient not taking: Reported on 4/25/2020), Disp: 9 tablet, Rfl: 0    naproxen (NAPROSYN) 250 mg tablet, Take 1 tablet (250 mg total) by mouth 2 (two) times a day with meals for 5 days, Disp: 10 tablet, Rfl: 0    norgestimate-ethinyl estradiol (ORTHO TRI-CYCLEN LO) 0 18/0 215/0 25 MG-25 MCG per tablet, Take 1 tablet by mouth daily, Disp: , Rfl:     ondansetron (ZOFRAN) 4 mg tablet, Take 1 tablet (4 mg total) by mouth every 8 (eight) hours as needed for nausea or vomiting, Disp: 20 tablet, Rfl: 0    sucralfate (CARAFATE) 1 g tablet, Take 1 tablet (1 g total) by mouth 4 (four) times a day, Disp: 20 tablet, Rfl: 0    Current Allergies     Allergies as of 08/07/2020 - Reviewed 08/07/2020   Allergen Reaction Noted    Cashew nut oil  08/06/2019    Azithromycin Rash 11/18/2018    Medical tape Rash 10/28/2019            The following portions of the patient's history were reviewed and updated as appropriate: allergies, current medications, past family history, past medical history, past social history, past surgical history and problem list      Past Medical History:   Diagnosis Date    Known health problems: none     Mitral valve prolapse     Psychiatric disorder     anxiety, depression    Raynaud's disease        Past Surgical History:   Procedure Laterality Date    VULVA SURGERY      WISDOM TOOTH EXTRACTION  06/19/2020       History reviewed  No pertinent family history  Medications have been verified          Objective   /72   Pulse 87   Temp 99 1 °F (37 3 °C) (Tympanic)   Resp 18   Ht 5' 1" (1 549 m)   Wt 46 7 kg (103 lb) LMP 07/15/2020 (LMP Unknown)   SpO2 98%   BMI 19 46 kg/m²        Physical Exam     Physical Exam  Vitals signs and nursing note reviewed  Constitutional:       General: She is not in acute distress  Appearance: Normal appearance  She is well-developed  Cardiovascular:      Rate and Rhythm: Normal rate and regular rhythm  Pulmonary:      Effort: Pulmonary effort is normal       Breath sounds: Normal breath sounds  Abdominal:      General: Bowel sounds are normal       Palpations: Abdomen is soft  Abdomen is not rigid  Tenderness: There is abdominal tenderness in the epigastric area  There is no guarding or rebound  Negative signs include Lopez's sign and McBurney's sign  Hernia: No hernia is present  Skin:     General: Skin is warm and dry

## 2020-08-07 NOTE — PATIENT INSTRUCTIONS
carafate and zofran as prescribed  Recommend pepcid 20mg tablets over the counter  [place] diet as directed  If any worsening abdominal pain or fever proceed to ER    Antibiotics as directed for UTI, will send for culture and follow up if any changes need to be made          Urinary Tract Infection in Women   WHAT YOU NEED TO KNOW:   A urinary tract infection (UTI) is caused by bacteria that get inside your urinary tract  Most bacteria that enter your urinary tract come out when you urinate  If the bacteria stay in your urinary tract, you may get an infection  Your urinary tract includes your kidneys, ureters, bladder, and urethra  Urine is made in your kidneys, and it flows from the ureters to the bladder  Urine leaves the bladder through the urethra  A UTI is more common in your lower urinary tract, which includes your bladder and urethra  DISCHARGE INSTRUCTIONS:   Return to the emergency department if:   · You are urinating very little or not at all  · You have a high fever with shaking chills  · You have side or back pain that gets worse  Contact your healthcare provider if:   · You have a fever  · You do not feel better after 2 days of taking antibiotics  · You are vomiting  · You have questions or concerns about your condition or care  Medicines:   · Antibiotics  help fight a bacterial infection  · Medicines  may be given to decrease pain and burning when you urinate  They will also help decrease the feeling that you need to urinate often  These medicines will make your urine orange or red  · Take your medicine as directed  Contact your healthcare provider if you think your medicine is not helping or if you have side effects  Tell him or her if you are allergic to any medicine  Keep a list of the medicines, vitamins, and herbs you take  Include the amounts, and when and why you take them  Bring the list or the pill bottles to follow-up visits   Carry your medicine list with you in case of an emergency  Follow up with your healthcare provider as directed:  Write down your questions so you remember to ask them during your visits  Prevent another UTI:   · Empty your bladder often  Urinate and empty your bladder as soon as you feel the need  Do not hold your urine for long periods of time  · Wipe from front to back after you urinate or have a bowel movement  This will help prevent germs from getting into your urinary tract through your urethra  · Drink liquids as directed  Ask how much liquid to drink each day and which liquids are best for you  You may need to drink more liquids than usual to help flush out the bacteria  Do not drink alcohol, caffeine, or citrus juices  These can irritate your bladder and increase your symptoms  Your healthcare provider may recommend cranberry juice to help prevent a UTI  · Urinate after you have sex  This can help flush out bacteria passed during sex  · Do not douche or use feminine deodorants  These can change the chemical balance in your vagina  · Change sanitary pads or tampons often  This will help prevent germs from getting into your urinary tract  · Do pelvic muscle exercises often  Pelvic muscle exercises may help you start and stop urinating  Strong pelvic muscles may help you empty your bladder easier  Squeeze these muscles tightly for 5 seconds like you are trying to hold back urine  Then relax for 5 seconds  Gradually work up to squeezing for 10 seconds  Do 3 sets of 15 repetitions a day, or as directed  © 2017 [employer] Information is for End User's use only and may not be sold, redistributed or otherwise used for commercial purposes  All illustrations and images included in CareNotes® are the copyrighted property of [employer]  or [employer]  The above information is an  only  It is not intended as medical advice for individual conditions or treatments   Talk to your doctor, nurse or pharmacist before following any medical regimen to see if it is safe and effective for you  Gastritis   WHAT YOU NEED TO KNOW:   Gastritis is inflammation or irritation of the lining of your stomach  DISCHARGE INSTRUCTIONS:   Call 911 for any of the following:   · You develop chest pain or shortness of breath  Return to the emergency department if:   · You vomit blood  · You have black or bloody bowel movements  · You have severe stomach or back pain  Contact your healthcare provider if:   · You have a fever  · You have new or worsening symptoms, even after treatment  · You have questions or concerns about your condition or care  Medicines:   · Medicines  may be given to help treat a bacterial infection or decrease stomach acid  · Take your medicine as directed  Contact your healthcare provider if you think your medicine is not helping or if you have side effects  Tell him or her if you are allergic to any medicine  Keep a list of the medicines, vitamins, and herbs you take  Include the amounts, and when and why you take them  Bring the list or the pill bottles to follow-up visits  Carry your medicine list with you in case of an emergency  Manage or prevent gastritis:   · Do not smoke  Nicotine and other chemicals in cigarettes and cigars can make your symptoms worse and cause lung damage  Ask your healthcare provider for information if you currently smoke and need help to quit  E-cigarettes or smokeless tobacco still contain nicotine  Talk to your healthcare provider before you use these products  · Do not drink alcohol  Alcohol can prevent healing and make your gastritis worse  Talk to your healthcare provider if you need help to stop drinking  · Do not take NSAIDs or aspirin unless directed  These and similar medicines can cause irritation  If your healthcare provider says it is okay to take NSAIDs, take them with food       · Do not eat foods that cause irritation  Foods such as oranges and salsa can cause burning or pain  Eat a variety of healthy foods  Examples include fruits (not citrus), vegetables, low-fat dairy products, beans, whole-grain breads, and lean meats and fish  Try to eat small meals, and drink water with your meals  Do not eat for at least 3 hours before you go to bed  · Find ways to relax and decrease stress  Stress can increase stomach acid and make gastritis worse  Activities such as yoga, meditation, or listening to music can help you relax  Spend time with friends, or do things you enjoy  Follow up with your healthcare provider as directed: You may need ongoing tests or treatment, or referral to a gastroenterologist  Write down your questions so you remember to ask them during your visits  © 2017 [employer] Information is for End User's use only and may not be sold, redistributed or otherwise used for commercial purposes  All illustrations and images included in CareNotes® are the copyrighted property of [employer]  or [employer] Re[employer]  The above information is an  only  It is not intended as medical advice for individual conditions or treatments  Talk to your doctor, nurse or pharmacist before following any medical regimen to see if it is safe and effective for you

## 2020-08-08 ENCOUNTER — TELEPHONE (OUTPATIENT)
Dept: URGENT CARE | Facility: CLINIC | Age: 25
End: 2020-08-08

## 2020-08-08 LAB — BACTERIA UR CULT: NORMAL

## 2020-08-08 NOTE — TELEPHONE ENCOUNTER
Pt called requesting results from her office urine dip, states she did not remember what the provider told her they were while she was here  Told her the urine dip was positive for large leukocytes and that a urine culture was also sent but not resulted yet  Pt states she still feels awful, feels nauseas but is taking the sucralfate and antibiotics as prescribed as well as zofran  Reports taking antibiotics with food  Encouraged to continue medications as prescribed, and will notify her of urine culture results  Pt states she did make a f/u appt with her PCP on Monday  Recommended if her symptoms continue to get worse to to go ER  Pt understands and agrees with plan

## 2020-08-09 ENCOUNTER — HOSPITAL ENCOUNTER (EMERGENCY)
Facility: HOSPITAL | Age: 25
Discharge: HOME | End: 2020-08-09
Attending: EMERGENCY MEDICINE
Payer: COMMERCIAL

## 2020-08-09 VITALS
TEMPERATURE: 98 F | RESPIRATION RATE: 17 BRPM | OXYGEN SATURATION: 100 % | HEART RATE: 85 BPM | SYSTOLIC BLOOD PRESSURE: 115 MMHG | DIASTOLIC BLOOD PRESSURE: 83 MMHG

## 2020-08-09 DIAGNOSIS — R10.12 LUQ ABDOMINAL PAIN: Primary | ICD-10-CM

## 2020-08-09 LAB
ALBUMIN SERPL BCP-MCNC: 4.3 G/DL (ref 3.5–5)
ALP SERPL-CCNC: 69 U/L (ref 46–116)
ALT SERPL W P-5'-P-CCNC: 33 U/L (ref 12–78)
ANION GAP SERPL CALCULATED.3IONS-SCNC: 6 MMOL/L (ref 4–13)
AST SERPL W P-5'-P-CCNC: 23 U/L (ref 5–45)
BACTERIA UR QL AUTO: ABNORMAL /HPF
BASOPHILS # BLD AUTO: 0.03 THOUSANDS/ΜL (ref 0–0.1)
BASOPHILS NFR BLD AUTO: 0 % (ref 0–1)
BILIRUB SERPL-MCNC: 0.4 MG/DL (ref 0.2–1)
BILIRUB UR QL STRIP: NEGATIVE
BUN SERPL-MCNC: 12 MG/DL (ref 5–25)
CALCIUM SERPL-MCNC: 9.1 MG/DL (ref 8.3–10.1)
CHLORIDE SERPL-SCNC: 103 MMOL/L (ref 100–108)
CLARITY UR: CLEAR
CO2 SERPL-SCNC: 32 MMOL/L (ref 21–32)
COLOR UR: ABNORMAL
CREAT SERPL-MCNC: 0.74 MG/DL (ref 0.6–1.3)
EOSINOPHIL # BLD AUTO: 0.07 THOUSAND/ΜL (ref 0–0.61)
EOSINOPHIL NFR BLD AUTO: 1 % (ref 0–6)
ERYTHROCYTE [DISTWIDTH] IN BLOOD BY AUTOMATED COUNT: 12.2 % (ref 11.6–15.1)
EXT PREG TEST URINE: NEGATIVE
EXT. CONTROL ED NAV: NORMAL
GFR SERPL CREATININE-BSD FRML MDRD: 115 ML/MIN/1.73SQ M
GLUCOSE SERPL-MCNC: 92 MG/DL (ref 65–140)
GLUCOSE UR STRIP-MCNC: NEGATIVE MG/DL
HCT VFR BLD AUTO: 46.5 % (ref 34.8–46.1)
HGB BLD-MCNC: 15.8 G/DL (ref 11.5–15.4)
HGB UR QL STRIP.AUTO: ABNORMAL
IMM GRANULOCYTES # BLD AUTO: 0.02 THOUSAND/UL (ref 0–0.2)
IMM GRANULOCYTES NFR BLD AUTO: 0 % (ref 0–2)
KETONES UR STRIP-MCNC: NEGATIVE MG/DL
LEUKOCYTE ESTERASE UR QL STRIP: NEGATIVE
LIPASE SERPL-CCNC: 124 U/L (ref 73–393)
LYMPHOCYTES # BLD AUTO: 2.24 THOUSANDS/ΜL (ref 0.6–4.47)
LYMPHOCYTES NFR BLD AUTO: 27 % (ref 14–44)
MCH RBC QN AUTO: 32.7 PG (ref 26.8–34.3)
MCHC RBC AUTO-ENTMCNC: 34 G/DL (ref 31.4–37.4)
MCV RBC AUTO: 96 FL (ref 82–98)
MONOCYTES # BLD AUTO: 0.5 THOUSAND/ΜL (ref 0.17–1.22)
MONOCYTES NFR BLD AUTO: 6 % (ref 4–12)
NEUTROPHILS # BLD AUTO: 5.45 THOUSANDS/ΜL (ref 1.85–7.62)
NEUTS SEG NFR BLD AUTO: 66 % (ref 43–75)
NITRITE UR QL STRIP: NEGATIVE
NON-SQ EPI CELLS URNS QL MICRO: ABNORMAL /HPF
NRBC BLD AUTO-RTO: 0 /100 WBCS
PH UR STRIP.AUTO: 6.5 [PH]
PLATELET # BLD AUTO: 239 THOUSANDS/UL (ref 149–390)
PMV BLD AUTO: 10.6 FL (ref 8.9–12.7)
POTASSIUM SERPL-SCNC: 3.8 MMOL/L (ref 3.5–5.3)
PROT SERPL-MCNC: 8.5 G/DL (ref 6.4–8.2)
PROT UR STRIP-MCNC: NEGATIVE MG/DL
RBC # BLD AUTO: 4.83 MILLION/UL (ref 3.81–5.12)
RBC #/AREA URNS AUTO: ABNORMAL /HPF
SODIUM SERPL-SCNC: 141 MMOL/L (ref 136–145)
SP GR UR STRIP.AUTO: <=1.005 (ref 1–1.03)
UROBILINOGEN UR QL STRIP.AUTO: 0.2 E.U./DL
WBC # BLD AUTO: 8.31 THOUSAND/UL (ref 4.31–10.16)
WBC #/AREA URNS AUTO: ABNORMAL /HPF

## 2020-08-09 PROCEDURE — 83690 ASSAY OF LIPASE: CPT | Performed by: PHYSICIAN ASSISTANT

## 2020-08-09 PROCEDURE — 85025 COMPLETE CBC W/AUTO DIFF WBC: CPT | Performed by: PHYSICIAN ASSISTANT

## 2020-08-09 PROCEDURE — 96360 HYDRATION IV INFUSION INIT: CPT

## 2020-08-09 PROCEDURE — 81025 URINE PREGNANCY TEST: CPT | Performed by: PHYSICIAN ASSISTANT

## 2020-08-09 PROCEDURE — 81001 URINALYSIS AUTO W/SCOPE: CPT | Performed by: PHYSICIAN ASSISTANT

## 2020-08-09 PROCEDURE — 99284 EMERGENCY DEPT VISIT MOD MDM: CPT

## 2020-08-09 PROCEDURE — 36415 COLL VENOUS BLD VENIPUNCTURE: CPT | Performed by: PHYSICIAN ASSISTANT

## 2020-08-09 PROCEDURE — 99284 EMERGENCY DEPT VISIT MOD MDM: CPT | Performed by: PHYSICIAN ASSISTANT

## 2020-08-09 PROCEDURE — 80053 COMPREHEN METABOLIC PANEL: CPT | Performed by: PHYSICIAN ASSISTANT

## 2020-08-09 RX ORDER — SUCRALFATE 1 G/1
1 TABLET ORAL 4 TIMES DAILY
Qty: 40 TABLET | Refills: 0 | Status: SHIPPED | OUTPATIENT
Start: 2020-08-09 | End: 2020-08-12 | Stop reason: ALTCHOICE

## 2020-08-09 RX ORDER — LIDOCAINE HYDROCHLORIDE 20 MG/ML
15 SOLUTION OROPHARYNGEAL ONCE
Status: COMPLETED | OUTPATIENT
Start: 2020-08-09 | End: 2020-08-09

## 2020-08-09 RX ORDER — SUCRALFATE ORAL 1 G/10ML
1000 SUSPENSION ORAL ONCE
Status: COMPLETED | OUTPATIENT
Start: 2020-08-09 | End: 2020-08-09

## 2020-08-09 RX ORDER — LIDOCAINE HYDROCHLORIDE 20 MG/ML
10 SOLUTION OROPHARYNGEAL 4 TIMES DAILY PRN
Qty: 100 ML | Refills: 0 | Status: SHIPPED | OUTPATIENT
Start: 2020-08-09 | End: 2020-08-12 | Stop reason: ALTCHOICE

## 2020-08-09 RX ORDER — FAMOTIDINE 20 MG/1
20 TABLET, FILM COATED ORAL 2 TIMES DAILY
Qty: 20 TABLET | Refills: 0 | Status: SHIPPED | OUTPATIENT
Start: 2020-08-09 | End: 2020-08-12 | Stop reason: ALTCHOICE

## 2020-08-09 RX ADMIN — SODIUM CHLORIDE 1000 ML: 0.9 INJECTION, SOLUTION INTRAVENOUS at 16:09

## 2020-08-09 RX ADMIN — LIDOCAINE HYDROCHLORIDE 15 ML: 20 SOLUTION ORAL; TOPICAL at 16:31

## 2020-08-09 RX ADMIN — SUCRALFATE 1000 MG: 1 SUSPENSION ORAL at 16:31

## 2020-08-09 NOTE — ED PROVIDER NOTES
History  Chief Complaint   Patient presents with    Abdominal Pain     Abdominal pain on the left side and radiates to her back, states it has been going on since tuesday  states she went to urgent care on friday and was neg for a UTI     23 yo female patient here for evaluation of abdominal pain  Onset about Tuesday  Occurs after eating food  LUQ burning pain about 30 minutes to 1 hour after eating  Lasts for a few hours before subsiding  Nausea but no vomiting  No chest pain or sob  No flank pain  Normal urination and normal bowel movements  No fever or chills  Went to urgent care on Friday and was started on carafate with meals  She also reports being started on protonix about 1 month ago for "bad GERD"  No trauma or injuries  No sick contacts  No new or unusual foods  History provided by:  Patient   used: No    Abdominal Pain   Pain location:  LUQ  Pain quality: burning    Pain radiates to:  Does not radiate  Pain severity:  Moderate  Onset quality:  Gradual  Duration:  6 days  Timing:  Intermittent  Progression since onset: with eating and drinking  Chronicity:  New  Context: eating    Relieved by:  Nothing  Worsened by:  Eating  Ineffective treatments:  None tried  Associated symptoms: nausea    Associated symptoms: no anorexia, no belching, no chest pain, no chills, no constipation, no cough, no diarrhea, no dysuria, no fatigue, no fever, no flatus, no hematuria, no melena, no shortness of breath, no sore throat and no vomiting    Risk factors: no alcohol abuse, no aspirin use, not elderly, has not had multiple surgeries, no NSAID use, not obese, not pregnant and no recent hospitalization        Prior to Admission Medications   Prescriptions Last Dose Informant Patient Reported? Taking?    cephalexin (KEFLEX) 500 mg capsule   No No   Sig: Take 1 capsule (500 mg total) by mouth every 6 (six) hours for 7 days   clonazePAM (KlonoPIN) 0 5 mg tablet   No No   Sig: Take 1 tablet (0 5 mg total) by mouth 2 (two) times a day as needed for anxiety   Patient not taking: Reported on 4/25/2020   naproxen (NAPROSYN) 250 mg tablet   No No   Sig: Take 1 tablet (250 mg total) by mouth 2 (two) times a day with meals for 5 days   norgestimate-ethinyl estradiol (ORTHO TRI-CYCLEN LO) 0 18/0 215/0 25 MG-25 MCG per tablet   Yes No   Sig: Take 1 tablet by mouth daily   ondansetron (ZOFRAN) 4 mg tablet   No No   Sig: Take 1 tablet (4 mg total) by mouth every 8 (eight) hours as needed for nausea or vomiting   sucralfate (CARAFATE) 1 g tablet   No No   Sig: Take 1 tablet (1 g total) by mouth 4 (four) times a day      Facility-Administered Medications: None       Past Medical History:   Diagnosis Date    Known health problems: none     Mitral valve prolapse     Psychiatric disorder     anxiety, depression    Raynaud's disease        Past Surgical History:   Procedure Laterality Date    VULVA SURGERY      WISDOM TOOTH EXTRACTION  06/19/2020       History reviewed  No pertinent family history  I have reviewed and agree with the history as documented  E-Cigarette/Vaping    E-Cigarette Use Never User      E-Cigarette/Vaping Substances    Nicotine No     THC No     CBD No     Flavoring No      Social History     Tobacco Use    Smoking status: Never Smoker    Smokeless tobacco: Never Used   Substance Use Topics    Alcohol use: Not Currently     Comment: "socially"    Drug use: Not Currently     Comment: hasn't used in a few months       Review of Systems   Constitutional: Negative for activity change, appetite change, chills, diaphoresis, fatigue, fever and unexpected weight change  HENT: Negative for congestion, rhinorrhea, sinus pressure, sore throat and trouble swallowing  Eyes: Negative for photophobia and visual disturbance  Respiratory: Negative for apnea, cough, choking, chest tightness, shortness of breath, wheezing and stridor      Cardiovascular: Negative for chest pain, palpitations and leg swelling  Gastrointestinal: Positive for abdominal pain and nausea  Negative for abdominal distention, anorexia, blood in stool, constipation, diarrhea, flatus, melena and vomiting  Genitourinary: Negative for decreased urine volume, difficulty urinating, dysuria, enuresis, flank pain, frequency, hematuria and urgency  Musculoskeletal: Negative for arthralgias, myalgias, neck pain and neck stiffness  Skin: Negative for color change, pallor, rash and wound  Allergic/Immunologic: Negative  Neurological: Negative for dizziness, tremors, syncope, weakness, light-headedness, numbness and headaches  Hematological: Negative  Psychiatric/Behavioral: Negative  All other systems reviewed and are negative  Physical Exam  Physical Exam  Vitals signs and nursing note reviewed  Constitutional:       General: She is not in acute distress  Appearance: Normal appearance  She is well-developed  She is not ill-appearing, toxic-appearing or diaphoretic  HENT:      Head: Normocephalic and atraumatic  Eyes:      General: Lids are normal       Pupils: Pupils are equal, round, and reactive to light  Neck:      Musculoskeletal: Normal range of motion and neck supple  Cardiovascular:      Rate and Rhythm: Normal rate and regular rhythm  Pulses: Normal pulses  No decreased pulses  Radial pulses are 2+ on the right side and 2+ on the left side  Heart sounds: Normal heart sounds, S1 normal and S2 normal  Heart sounds not distant  No murmur  No friction rub  No gallop  Pulmonary:      Effort: Pulmonary effort is normal  No tachypnea, bradypnea, accessory muscle usage or respiratory distress  Breath sounds: Normal breath sounds  No decreased breath sounds, wheezing, rhonchi or rales  Abdominal:      General: There is no distension  Palpations: Abdomen is soft  Abdomen is not rigid  Tenderness: There is abdominal tenderness in the left upper quadrant   There is no right CVA tenderness, left CVA tenderness, guarding or rebound  Negative signs include Lopez's sign, Rovsing's sign and McBurney's sign  Hernia: No hernia is present  Comments: Specifically no tenderness at mcburney's point and no RUQ tenderness  Musculoskeletal: Normal range of motion  General: No tenderness or deformity  Skin:     General: Skin is warm and dry  Coloration: Skin is not pale  Findings: No erythema or rash  Neurological:      Mental Status: She is alert and oriented to person, place, and time  GCS: GCS eye subscore is 4  GCS verbal subscore is 5  GCS motor subscore is 6  Cranial Nerves: No cranial nerve deficit     Psychiatric:         Speech: Speech normal          Vital Signs  ED Triage Vitals [08/09/20 1551]   Temperature Pulse Respirations Blood Pressure SpO2   98 °F (36 7 °C) 85 17 115/83 100 %      Temp Source Heart Rate Source Patient Position - Orthostatic VS BP Location FiO2 (%)   Oral Monitor Sitting Left arm --      Pain Score       --           Vitals:    08/09/20 1551   BP: 115/83   Pulse: 85   Patient Position - Orthostatic VS: Sitting         Visual Acuity      ED Medications  Medications   sodium chloride 0 9 % bolus 1,000 mL (1,000 mL Intravenous New Bag 8/9/20 1609)   sucralfate (CARAFATE) oral suspension 1,000 mg (1,000 mg Oral Given 8/9/20 1631)   Lidocaine Viscous HCl (XYLOCAINE) 2 % mucosal solution 15 mL (15 mL Swish & Swallow Given 8/9/20 1631)       Diagnostic Studies  Results Reviewed     Procedure Component Value Units Date/Time    Urine Microscopic [[id number]]  (Abnormal) Collected:  08/09/20 1611    Lab Status:  Final result Specimen:  Urine, Clean Catch Updated:  08/09/20 1640     RBC, UA 2-4 /hpf      WBC, UA 1-2 /hpf      Epithelial Cells Occasional /hpf      Bacteria, UA None Seen /hpf     Comprehensive metabolic panel [[id number]]  (Abnormal) Collected:  08/09/20 1611    Lab Status:  Final result Specimen:  Blood from Arm, Right Updated:  08/09/20 1636     Sodium 141 mmol/L      Potassium 3 8 mmol/L      Chloride 103 mmol/L      CO2 32 mmol/L      ANION GAP 6 mmol/L      BUN 12 mg/dL      Creatinine 0 74 mg/dL      Glucose 92 mg/dL      Calcium 9 1 mg/dL      AST 23 U/L      ALT 33 U/L      Alkaline Phosphatase 69 U/L      Total Protein 8 5 g/dL      Albumin 4 3 g/dL      Total Bilirubin 0 40 mg/dL      eGFR 115 ml/min/1 73sq m     Narrative:       National Kidney Disease Foundation guidelines for Chronic Kidney Disease (CKD):     Stage 1 with normal or high GFR (GFR > 90 mL/min/1 73 square meters)    Stage 2 Mild CKD (GFR = 60-89 mL/min/1 73 square meters)    Stage 3A Moderate CKD (GFR = 45-59 mL/min/1 73 square meters)    Stage 3B Moderate CKD (GFR = 30-44 mL/min/1 73 square meters)    Stage 4 Severe CKD (GFR = 15-29 mL/min/1 73 square meters)    Stage 5 End Stage CKD (GFR <15 mL/min/1 73 square meters)  Note: GFR calculation is accurate only with a steady state creatinine    Lipase [[id number]]  (Normal) Collected:  08/09/20 1611    Lab Status:  Final result Specimen:  Blood from Arm, Right Updated:  08/09/20 1636     Lipase 124 u/L     UA w Reflex to Microscopic w Reflex to Culture [[id number]]  (Abnormal) Collected:  08/09/20 1611    Lab Status:  Final result Specimen:  Urine, Clean Catch Updated:  08/09/20 1620     Color, UA Light Yellow     Clarity, UA Clear     Specific Gravity, UA <=1 005     pH, UA 6 5     Leukocytes, UA Negative     Nitrite, UA Negative     Protein, UA Negative mg/dl      Glucose, UA Negative mg/dl      Ketones, UA Negative mg/dl      Urobilinogen, UA 0 2 E U /dl      Bilirubin, UA Negative     Blood, UA Large    CBC and differential [[id number]]  (Abnormal) Collected:  08/09/20 1611    Lab Status:  Final result Specimen:  Blood from Arm, Right Updated:  08/09/20 1617     WBC 8 31 Thousand/uL      RBC 4 83 Million/uL      Hemoglobin 15 8 g/dL      Hematocrit 46 5 %      MCV 96 fL      MCH 32 7 pg      MCHC 34 0 g/dL      RDW 12 2 %      MPV 10 6 fL      Platelets 787 Thousands/uL      nRBC 0 /100 WBCs      Neutrophils Relative 66 %      Immat GRANS % 0 %      Lymphocytes Relative 27 %      Monocytes Relative 6 %      Eosinophils Relative 1 %      Basophils Relative 0 %      Neutrophils Absolute 5 45 Thousands/µL      Immature Grans Absolute 0 02 Thousand/uL      Lymphocytes Absolute 2 24 Thousands/µL      Monocytes Absolute 0 50 Thousand/µL      Eosinophils Absolute 0 07 Thousand/µL      Basophils Absolute 0 03 Thousands/µL     POCT pregnancy, urine [[id number]]  (Normal) Resulted:  08/09/20 1616    Lab Status:  Final result Updated:  08/09/20 1616     EXT PREG TEST UR (Ref: Negative) negative     Control   No orders to display              Procedures  Procedures         ED Course                                             MDM  Number of Diagnoses or Management Options  LUQ abdominal pain: new and requires workup  Diagnosis management comments: ddx includes but is not limited to UTI, gastritis, PUD, pancreatitis, pyelo, doubt cholecystitis, doubt appendicitis  Plan: Labs  GI cocktail  Dispo pending  Amount and/or Complexity of Data Reviewed  Clinical lab tests: ordered and reviewed  Tests in the radiology section of CPT®: ordered and reviewed  Independent visualization of images, tracings, or specimens: yes    Risk of Complications, Morbidity, and/or Mortality  Presenting problems: moderate  Management options: low  General comments: 23 yo female with LUQ pain  Labs unremarkable  Workup and history suggestive of gastritis/gerd/PUD  Recommended carafate QID  Protonix  Pepcid  F/u with GI  Return parameters provided  Pt understands and agrees with plan        Patient Progress  Patient progress: stable        Disposition  Final diagnoses:   LUQ abdominal pain     Time reflects when diagnosis was documented in both MDM as applicable and the Disposition within this note     Time User Action Codes Description Comment    8/9/2020  5:12 PM Sruthi Lara Add [R10 12] LUQ abdominal pain       ED Disposition     ED Disposition Condition Date/Time Comment    Discharge Stable Sun Aug 9, 2020  5:12 PM Lorie Powers discharge to home/self care  Follow-up Information     Follow up With Specialties Details Why Contact Info Additional [address] [facility] Emergency Department Emergency Medicine Go to  If symptoms worsen [address] [facility] [address], [address], [place], [address]  [facility] [place] [facility] Call  for follow up appointment [facility] [address] [facility] [place], [address] [facility], [address], [place], [address] [facility]          Patient's Medications   Discharge Prescriptions    FAMOTIDINE (PEPCID) 20 MG TABLET    Take 1 tablet (20 mg total) by mouth 2 (two) times a day for 10 days       Start Date: 8/9/2020  End Date: 8/19/2020       Order Dose: 20 mg       Quantity: 20 tablet    Refills: 0    LIDOCAINE VISCOUS HCL (XYLOCAINE) 2 % MUCOSAL SOLUTION    Swish and spit 10 mL 4 (four) times a day as needed for mouth pain or discomfort       Start Date: 8/9/2020  End Date: --       Order Dose: 10 mL       Quantity: 100 mL    Refills: 0    SUCRALFATE (CARAFATE) 1 G TABLET    Take 1 tablet (1 g total) by mouth 4 (four) times a day for 10 days       Start Date: 8/9/2020  End Date: 8/19/2020       Order Dose: 1 g       Quantity: 40 tablet    Refills: 0     No discharge procedures on file      PDMP Review     None          ED Provider  Electronically Signed by           Cheryle Tello PA-C  08/09/20 0492

## 2020-08-12 ENCOUNTER — OFFICE VISIT (OUTPATIENT)
Dept: GASTROENTEROLOGY | Facility: CLINIC | Age: 25
End: 2020-08-12
Payer: COMMERCIAL

## 2020-08-12 VITALS
WEIGHT: 101 LBS | HEART RATE: 60 BPM | TEMPERATURE: 97.8 F | HEIGHT: 61 IN | DIASTOLIC BLOOD PRESSURE: 60 MMHG | BODY MASS INDEX: 19.07 KG/M2 | SYSTOLIC BLOOD PRESSURE: 100 MMHG

## 2020-08-12 DIAGNOSIS — R11.0 NAUSEA: ICD-10-CM

## 2020-08-12 DIAGNOSIS — R10.13 EPIGASTRIC PAIN: Primary | ICD-10-CM

## 2020-08-12 PROCEDURE — 99203 OFFICE O/P NEW LOW 30 MIN: CPT | Performed by: PHYSICIAN ASSISTANT

## 2020-08-12 RX ORDER — PANTOPRAZOLE SODIUM 40 MG/1
TABLET, DELAYED RELEASE ORAL
COMMUNITY
Start: 2020-07-03 | End: 2021-02-08 | Stop reason: HOSPADM

## 2020-08-12 NOTE — H&P (VIEW-ONLY)
[facility] - Outpatient Consultation  Pamella Islas 22 y o  female MRN: [id number]  Encounter: [id number]          ASSESSMENT AND PLAN:      1  Epigastric pain  2  Nausea    Patient presents with a history of recurrent postprandial epigastric pain and nausea  EGD to investigate for PUD, gastritis, bx for h pylori  US to evaluate gallbladder  Pantoprazole 40mg po daily x 6-8 weeks  Follow up after testing   ______________________________________________________________________    HPI:  Patient is a 26-year-old female who presents to the office for an evaluation of abdominal pain  Patient reports that she has been having recurrent epigastric abdominal discomfort  She also reports of associated nausea but no vomiting  She describes it as burning at times and also as a pressure  The pain is worse after eating  She denies any melena  She denies any dysphagia  She states she has been having heartburn as well  She reports she initially had it back in July but then it subsided for a little bit and then returned again  She has never had an EGD before  She has not had an ultrasound to evaluate her symptoms  She was given the medication Carafate which she is taking and believes it to be helping a little better  She was also given a prescription for pantoprazole  She denies frequent NSAID use  REVIEW OF SYSTEMS:    CONSTITUTIONAL: Denies any fever, chills, rigors, and weight loss  HEENT: No earache or tinnitus  Denies hearing loss or visual disturbances  CARDIOVASCULAR: No chest pain or palpitations  RESPIRATORY: Denies any cough, hemoptysis, shortness of breath or dyspnea on exertion  GASTROINTESTINAL: As noted in the History of Present Illness  GENITOURINARY: No problems with urination  Denies any hematuria or dysuria  NEUROLOGIC: No dizziness or vertigo, denies headaches  MUSCULOSKELETAL: Denies any muscle or joint pain  SKIN: Denies skin rashes or itching  ENDOCRINE: Denies excessive thirst  Denies intolerance to heat or cold  PSYCHOSOCIAL: Denies depression or anxiety  Denies any recent memory loss  Historical Information   Past Medical History:   Diagnosis Date    Known health problems: none     Mitral valve prolapse     Psychiatric disorder     anxiety, depression    Raynaud's disease      Past Surgical History:   Procedure Laterality Date    VULVA SURGERY      WISDOM TOOTH EXTRACTION  06/19/2020     Social History   Social History     Substance and Sexual Activity   Alcohol Use Not Currently    Comment: "socially"     Social History     Substance and Sexual Activity   Drug Use Not Currently    Comment: hasn't used in a few months     Social History     Tobacco Use   Smoking Status Never Smoker   Smokeless Tobacco Never Used     Family History   Problem Relation Age of Onset    Diabetes Father        Meds/Allergies       Current Outpatient Medications:     norgestimate-ethinyl estradiol (ORTHO TRI-CYCLEN LO) 0 18/0 215/0 25 MG-25 MCG per tablet    pantoprazole (PROTONIX) 40 mg tablet    sucralfate (CARAFATE) 1 g tablet    Allergies   Allergen Reactions    Cashew Nut Oil     Azithromycin Rash    Medical Tape Rash     Long Term EKG leads, (allergic to sticky EKG leads as well as Foam electrodes) - skin peels and has red rash           Objective     Blood pressure 100/60, pulse 60, temperature 97 8 °F (36 6 °C), height 5' 1" (1 549 m), weight 45 8 kg (101 lb), last menstrual period 07/15/2020  Body mass index is 19 08 kg/m²  PHYSICAL EXAM:      General Appearance:   Alert, cooperative, no distress   HEENT:   Normocephalic, atraumatic, anicteric      Neck:  Supple, symmetrical, trachea midline   Lungs:   Clear to auscultation bilaterally; no rales, rhonchi or wheezing; respirations unlabored    Heart[de-identified]   Regular rate and rhythm; no murmur, rub, or gallop     Abdomen:   Soft, non-tender, non-distended; normal bowel sounds; no masses, no organomegaly    Genitalia:   Deferred    Rectal:   Deferred    Extremities:  No cyanosis, clubbing or edema    Pulses:  2+ and symmetric    Skin:  No jaundice, rashes, or lesions    Lymph nodes:  No palpable cervical lymphadenopathy        Lab Results:   No visits with results within 1 Day(s) from this visit     Latest known visit with results is:   Admission on 08/09/2020, Discharged on 08/09/2020   Component Date Value    WBC 08/09/2020 8 31     RBC 08/09/2020 4 83     Hemoglobin 08/09/2020 15 8*    Hematocrit 08/09/2020 46 5*    MCV 08/09/2020 96     MCH 08/09/2020 32 7     MCHC 08/09/2020 34 0     RDW 08/09/2020 12 2     MPV 08/09/2020 10 6     Platelets 69/25/0832 239     nRBC 08/09/2020 0     Neutrophils Relative 08/09/2020 66     Immat GRANS % 08/09/2020 0     Lymphocytes Relative 08/09/2020 27     Monocytes Relative 08/09/2020 6     Eosinophils Relative 08/09/2020 1     Basophils Relative 08/09/2020 0     Neutrophils Absolute 08/09/2020 5 45     Immature Grans Absolute 08/09/2020 0 02     Lymphocytes Absolute 08/09/2020 2 24     Monocytes Absolute 08/09/2020 0 50     Eosinophils Absolute 08/09/2020 0 07     Basophils Absolute 08/09/2020 0 03     Sodium 08/09/2020 141     Potassium 08/09/2020 3 8     Chloride 08/09/2020 103     CO2 08/09/2020 32     ANION GAP 08/09/2020 6     BUN 08/09/2020 12     Creatinine 08/09/2020 0 74     Glucose 08/09/2020 92     Calcium 08/09/2020 9 1     AST 08/09/2020 23     ALT 08/09/2020 33     Alkaline Phosphatase 08/09/2020 69     Total Protein 08/09/2020 8 5*    Albumin 08/09/2020 4 3     Total Bilirubin 08/09/2020 0 40     eGFR 08/09/2020 115     Lipase 08/09/2020 124     Color, UA 08/09/2020 Light Yellow     Clarity, UA 08/09/2020 Clear     Specific Gravity, UA 08/09/2020 <=1 005     pH, UA 08/09/2020 6 5     Leukocytes, UA 08/09/2020 Negative     Nitrite, UA 08/09/2020 Negative     Protein, UA 08/09/2020 Negative     Glucose, UA 08/09/2020 Negative     Ketones, UA 08/09/2020 Negative     Urobilinogen, UA 08/09/2020 0 2     Bilirubin, UA 08/09/2020 Negative     Blood, UA 08/09/2020 Large*    EXT PREG TEST UR (Ref: N* 08/09/2020 negative     Control 08/09/2020    RBC, UA 08/09/2020 2-4*    WBC, UA 08/09/2020 1-2*    Epithelial Cells 08/09/2020 Occasional     Bacteria, UA 08/09/2020 None Seen          Radiology Results:   No results found

## 2020-08-12 NOTE — PROGRESS NOTES
[facility] - Outpatient Consultation  Stewrat Avina 22 y o  female MRN: [id number]  Encounter: [id number]          ASSESSMENT AND PLAN:      1  Epigastric pain  2  Nausea    Patient presents with a history of recurrent postprandial epigastric pain and nausea  EGD to investigate for PUD, gastritis, bx for h pylori  US to evaluate gallbladder  Pantoprazole 40mg po daily x 6-8 weeks  Follow up after testing   ______________________________________________________________________    HPI:  Patient is a 61-year-old female who presents to the office for an evaluation of abdominal pain  Patient reports that she has been having recurrent epigastric abdominal discomfort  She also reports of associated nausea but no vomiting  She describes it as burning at times and also as a pressure  The pain is worse after eating  She denies any melena  She denies any dysphagia  She states she has been having heartburn as well  She reports she initially had it back in July but then it subsided for a little bit and then returned again  She has never had an EGD before  She has not had an ultrasound to evaluate her symptoms  She was given the medication Carafate which she is taking and believes it to be helping a little better  She was also given a prescription for pantoprazole  She denies frequent NSAID use  REVIEW OF SYSTEMS:    CONSTITUTIONAL: Denies any fever, chills, rigors, and weight loss  HEENT: No earache or tinnitus  Denies hearing loss or visual disturbances  CARDIOVASCULAR: No chest pain or palpitations  RESPIRATORY: Denies any cough, hemoptysis, shortness of breath or dyspnea on exertion  GASTROINTESTINAL: As noted in the History of Present Illness  GENITOURINARY: No problems with urination  Denies any hematuria or dysuria  NEUROLOGIC: No dizziness or vertigo, denies headaches  MUSCULOSKELETAL: Denies any muscle or joint pain  SKIN: Denies skin rashes or itching  ENDOCRINE: Denies excessive thirst  Denies intolerance to heat or cold  PSYCHOSOCIAL: Denies depression or anxiety  Denies any recent memory loss  Historical Information   Past Medical History:   Diagnosis Date    Known health problems: none     Mitral valve prolapse     Psychiatric disorder     anxiety, depression    Raynaud's disease      Past Surgical History:   Procedure Laterality Date    VULVA SURGERY      WISDOM TOOTH EXTRACTION  06/19/2020     Social History   Social History     Substance and Sexual Activity   Alcohol Use Not Currently    Comment: "socially"     Social History     Substance and Sexual Activity   Drug Use Not Currently    Comment: hasn't used in a few months     Social History     Tobacco Use   Smoking Status Never Smoker   Smokeless Tobacco Never Used     Family History   Problem Relation Age of Onset    Diabetes Father        Meds/Allergies       Current Outpatient Medications:     norgestimate-ethinyl estradiol (ORTHO TRI-CYCLEN LO) 0 18/0 215/0 25 MG-25 MCG per tablet    pantoprazole (PROTONIX) 40 mg tablet    sucralfate (CARAFATE) 1 g tablet    Allergies   Allergen Reactions    Cashew Nut Oil     Azithromycin Rash    Medical Tape Rash     Long Term EKG leads, (allergic to sticky EKG leads as well as Foam electrodes) - skin peels and has red rash           Objective     Blood pressure 100/60, pulse 60, temperature 97 8 °F (36 6 °C), height 5' 1" (1 549 m), weight 45 8 kg (101 lb), last menstrual period 07/15/2020  Body mass index is 19 08 kg/m²  PHYSICAL EXAM:      General Appearance:   Alert, cooperative, no distress   HEENT:   Normocephalic, atraumatic, anicteric      Neck:  Supple, symmetrical, trachea midline   Lungs:   Clear to auscultation bilaterally; no rales, rhonchi or wheezing; respirations unlabored    Heart[de-identified]   Regular rate and rhythm; no murmur, rub, or gallop     Abdomen:   Soft, non-tender, non-distended; normal bowel sounds; no masses, no organomegaly    Genitalia:   Deferred    Rectal:   Deferred    Extremities:  No cyanosis, clubbing or edema    Pulses:  2+ and symmetric    Skin:  No jaundice, rashes, or lesions    Lymph nodes:  No palpable cervical lymphadenopathy        Lab Results:   No visits with results within 1 Day(s) from this visit     Latest known visit with results is:   Admission on 08/09/2020, Discharged on 08/09/2020   Component Date Value    WBC 08/09/2020 8 31     RBC 08/09/2020 4 83     Hemoglobin 08/09/2020 15 8*    Hematocrit 08/09/2020 46 5*    MCV 08/09/2020 96     MCH 08/09/2020 32 7     MCHC 08/09/2020 34 0     RDW 08/09/2020 12 2     MPV 08/09/2020 10 6     Platelets 88/25/8492 239     nRBC 08/09/2020 0     Neutrophils Relative 08/09/2020 66     Immat GRANS % 08/09/2020 0     Lymphocytes Relative 08/09/2020 27     Monocytes Relative 08/09/2020 6     Eosinophils Relative 08/09/2020 1     Basophils Relative 08/09/2020 0     Neutrophils Absolute 08/09/2020 5 45     Immature Grans Absolute 08/09/2020 0 02     Lymphocytes Absolute 08/09/2020 2 24     Monocytes Absolute 08/09/2020 0 50     Eosinophils Absolute 08/09/2020 0 07     Basophils Absolute 08/09/2020 0 03     Sodium 08/09/2020 141     Potassium 08/09/2020 3 8     Chloride 08/09/2020 103     CO2 08/09/2020 32     ANION GAP 08/09/2020 6     BUN 08/09/2020 12     Creatinine 08/09/2020 0 74     Glucose 08/09/2020 92     Calcium 08/09/2020 9 1     AST 08/09/2020 23     ALT 08/09/2020 33     Alkaline Phosphatase 08/09/2020 69     Total Protein 08/09/2020 8 5*    Albumin 08/09/2020 4 3     Total Bilirubin 08/09/2020 0 40     eGFR 08/09/2020 115     Lipase 08/09/2020 124     Color, UA 08/09/2020 Light Yellow     Clarity, UA 08/09/2020 Clear     Specific Gravity, UA 08/09/2020 <=1 005     pH, UA 08/09/2020 6 5     Leukocytes, UA 08/09/2020 Negative     Nitrite, UA 08/09/2020 Negative     Protein, UA 08/09/2020 Negative     Glucose, UA 08/09/2020 Negative     Ketones, UA 08/09/2020 Negative     Urobilinogen, UA 08/09/2020 0 2     Bilirubin, UA 08/09/2020 Negative     Blood, UA 08/09/2020 Large*    EXT PREG TEST UR (Ref: N* 08/09/2020 negative     Control 08/09/2020    RBC, UA 08/09/2020 2-4*    WBC, UA 08/09/2020 1-2*    Epithelial Cells 08/09/2020 Occasional     Bacteria, UA 08/09/2020 None Seen          Radiology Results:   No results found

## 2020-08-13 ENCOUNTER — TELEPHONE (OUTPATIENT)
Dept: GASTROENTEROLOGY | Facility: CLINIC | Age: 25
End: 2020-08-13

## 2020-08-22 ENCOUNTER — ANESTHESIA EVENT (OUTPATIENT)
Dept: GASTROENTEROLOGY | Facility: HOSPITAL | Age: 25
End: 2020-08-22

## 2020-08-22 ENCOUNTER — HOSPITAL ENCOUNTER (OUTPATIENT)
Dept: GASTROENTEROLOGY | Facility: HOSPITAL | Age: 25
Setting detail: OUTPATIENT SURGERY
Discharge: HOME | End: 2020-08-22
Attending: INTERNAL MEDICINE
Payer: COMMERCIAL

## 2020-08-22 ENCOUNTER — ANESTHESIA (OUTPATIENT)
Dept: GASTROENTEROLOGY | Facility: HOSPITAL | Age: 25
End: 2020-08-22

## 2020-08-22 VITALS
DIASTOLIC BLOOD PRESSURE: 64 MMHG | HEIGHT: 61 IN | WEIGHT: 100.09 LBS | OXYGEN SATURATION: 98 % | BODY MASS INDEX: 18.9 KG/M2 | RESPIRATION RATE: 15 BRPM | TEMPERATURE: 97.8 F | SYSTOLIC BLOOD PRESSURE: 111 MMHG | HEART RATE: 79 BPM

## 2020-08-22 DIAGNOSIS — R10.13 EPIGASTRIC PAIN: ICD-10-CM

## 2020-08-22 LAB
EXT PREGNANCY TEST URINE: NEGATIVE
EXT. CONTROL: NORMAL

## 2020-08-22 PROCEDURE — 88305 TISSUE EXAM BY PATHOLOGIST: CPT | Performed by: PATHOLOGY

## 2020-08-22 PROCEDURE — 43239 EGD BIOPSY SINGLE/MULTIPLE: CPT | Performed by: INTERNAL MEDICINE

## 2020-08-22 PROCEDURE — 81025 URINE PREGNANCY TEST: CPT | Performed by: ANESTHESIOLOGY

## 2020-08-22 RX ORDER — LIDOCAINE HYDROCHLORIDE 10 MG/ML
INJECTION, SOLUTION EPIDURAL; INFILTRATION; INTRACAUDAL; PERINEURAL AS NEEDED
Status: DISCONTINUED | OUTPATIENT
Start: 2020-08-22 | End: 2020-08-22

## 2020-08-22 RX ORDER — PROPOFOL 10 MG/ML
INJECTION, EMULSION INTRAVENOUS AS NEEDED
Status: DISCONTINUED | OUTPATIENT
Start: 2020-08-22 | End: 2020-08-22

## 2020-08-22 RX ORDER — SODIUM CHLORIDE, SODIUM LACTATE, POTASSIUM CHLORIDE, CALCIUM CHLORIDE 600; 310; 30; 20 MG/100ML; MG/100ML; MG/100ML; MG/100ML
125 INJECTION, SOLUTION INTRAVENOUS CONTINUOUS
Status: DISCONTINUED | OUTPATIENT
Start: 2020-08-22 | End: 2020-08-22

## 2020-08-22 RX ADMIN — PROPOFOL 100 MG: 10 INJECTION, EMULSION INTRAVENOUS at 11:37

## 2020-08-22 RX ADMIN — PROPOFOL 50 MG: 10 INJECTION, EMULSION INTRAVENOUS at 11:39

## 2020-08-22 RX ADMIN — LIDOCAINE HYDROCHLORIDE 50 MG: 10 INJECTION, SOLUTION EPIDURAL; INFILTRATION; INTRACAUDAL; PERINEURAL at 11:37

## 2020-08-22 RX ADMIN — SODIUM CHLORIDE, SODIUM LACTATE, POTASSIUM CHLORIDE, AND CALCIUM CHLORIDE: .6; .31; .03; .02 INJECTION, SOLUTION INTRAVENOUS at 11:34

## 2020-08-22 RX ADMIN — PROPOFOL 50 MG: 10 INJECTION, EMULSION INTRAVENOUS at 11:38

## 2020-08-22 NOTE — ANESTHESIA PREPROCEDURE EVALUATION
Procedure:  EGD    Relevant Problems   No relevant active problems        Physical Exam    Airway    Mallampati score: II  TM Distance: >3 FB  Neck ROM: full     Dental   No notable dental hx     Cardiovascular  Rhythm: regular, Rate: normal, Cardiovascular exam normal    Pulmonary  Pulmonary exam normal Breath sounds clear to auscultation,     Other Findings        Anesthesia Plan  ASA Score- 2     Anesthesia Type- IV sedation with anesthesia with ASA Monitors  Additional Monitors:   Airway Plan:           Plan Factors-Exercise tolerance (METS): >4 METS  Chart reviewed  EKG reviewed  Existing labs reviewed  Patient summary reviewed  Patient is not a current smoker  Induction- intravenous  Postoperative Plan- Plan for postoperative opioid use  Informed Consent- Anesthetic plan and risks discussed with patient  I personally reviewed this patient with the CRNA  Discussed and agreed on the Anesthesia Plan with the CRNA  Valentín Johnson

## 2020-08-22 NOTE — INTERVAL H&P NOTE
H&P reviewed  After examining the patient I find no changes in the patients condition since the H&P had been written      Vitals:    08/22/20 1023   BP: 118/67   Pulse: 89   Resp: 19   Temp: 99 1 °F (37 3 °C)   SpO2: 99%

## 2020-08-22 NOTE — DISCHARGE INSTRUCTIONS
Upper Endoscopy   WHAT YOU NEED TO KNOW:   An upper endoscopy is also called an upper gastrointestinal (GI) endoscopy, or an esophagogastroduodenoscopy (EGD)  You may feel bloated, gassy, or have some abdominal discomfort after your procedure  Your throat may be sore for 24 to 36 hours  You may burp or pass gas from air that is still inside your body  DISCHARGE INSTRUCTIONS:   Call 911 if:   · You have sudden chest pain or trouble breathing  Seek care immediately if:   · You feel dizzy or faint  · You have trouble swallowing  · You have severe throat pain  · Your bowel movements are very dark or black  · Your abdomen is hard and firm and you have severe pain  · You vomit blood  Contact your healthcare provider if:   · You feel full or bloated and cannot burp or pass gas  · You have not had a bowel movement for 3 days after your procedure  · You have neck pain  · You have a fever or chills  · You have nausea or are vomiting  · You have a rash or hives  · You have questions or concerns about your endoscopy  Relieve a sore throat:  Suck on throat lozenges or crushed ice  Gargle with a small amount of warm salt water  Mix 1 teaspoon of salt and 1 cup of warm water to make salt water  Relieve gas and discomfort from bloating:  Lie on your right side with a heating pad on your abdomen  Take short walks to help pass gas  Eat small meals until bloating is relieved  Rest after your procedure:  Do not drive or make important decisions until the day after your procedure  Return to your normal activity as directed  You can usually return to work the day after your procedure  Follow up with your healthcare provider as directed:  Write down your questions so you remember to ask them during your visits  © 2017 [employer]  Information is for End User's use only and may not be sold, redistributed or otherwise used for commercial purposes   All illustrations and images included in [employer] are the copyrighted property of [employer] , [employer]  or Emiliano Rea  The above information is an  only  It is not intended as medical advice for individual conditions or treatments  Talk to your doctor, nurse or pharmacist before following any medical regimen to see if it is safe and effective for you

## 2020-08-22 NOTE — ANESTHESIA POSTPROCEDURE EVALUATION
Post-Op Assessment Note    CV Status:  Stable  Pain Score: 0    Pain management: adequate     Mental Status:  Sleepy   Hydration Status:  Stable   PONV Controlled:  None   Airway Patency:  Patent and adequate      Post Op Vitals Reviewed: Yes      Staff: CRNA         No complications documented      /59 (08/22/20 1148)    Temp 97 8 °F (36 6 °C) (08/22/20 1148)    Pulse 75 (08/22/20 1148)   Resp 21 (08/22/20 1148)    SpO2 95 % (08/22/20 1148)

## 2020-08-30 ENCOUNTER — HOSPITAL ENCOUNTER (OUTPATIENT)
Dept: ULTRASOUND IMAGING | Facility: HOSPITAL | Age: 25
Discharge: HOME | End: 2020-08-30
Payer: COMMERCIAL

## 2020-08-30 DIAGNOSIS — R10.13 EPIGASTRIC PAIN: ICD-10-CM

## 2020-08-30 PROCEDURE — 76705 ECHO EXAM OF ABDOMEN: CPT

## 2020-09-15 ENCOUNTER — OFFICE VISIT (OUTPATIENT)
Dept: URGENT CARE | Facility: MEDICAL CENTER | Age: 25
End: 2020-09-15
Payer: COMMERCIAL

## 2020-09-15 ENCOUNTER — APPOINTMENT (OUTPATIENT)
Dept: RADIOLOGY | Facility: MEDICAL CENTER | Age: 25
End: 2020-09-15
Payer: COMMERCIAL

## 2020-09-15 VITALS
TEMPERATURE: 98.4 F | HEIGHT: 61 IN | SYSTOLIC BLOOD PRESSURE: 108 MMHG | RESPIRATION RATE: 18 BRPM | BODY MASS INDEX: 19.11 KG/M2 | WEIGHT: 101.2 LBS | OXYGEN SATURATION: 99 % | DIASTOLIC BLOOD PRESSURE: 67 MMHG | HEART RATE: 83 BPM

## 2020-09-15 DIAGNOSIS — S16.1XXA CERVICAL STRAIN, ACUTE, INITIAL ENCOUNTER: Primary | ICD-10-CM

## 2020-09-15 DIAGNOSIS — M54.2 NECK PAIN: ICD-10-CM

## 2020-09-15 PROCEDURE — 99213 OFFICE O/P EST LOW 20 MIN: CPT | Performed by: PHYSICIAN ASSISTANT

## 2020-09-15 PROCEDURE — 72050 X-RAY EXAM NECK SPINE 4/5VWS: CPT

## 2020-09-15 RX ORDER — IBUPROFEN 600 MG/1
600 TABLET ORAL EVERY 8 HOURS SCHEDULED
Qty: 30 TABLET | Refills: 0 | Status: SHIPPED | OUTPATIENT
Start: 2020-09-15 | End: 2021-02-08 | Stop reason: HOSPADM

## 2020-09-15 RX ORDER — METAXALONE 800 MG/1
800 TABLET ORAL EVERY 8 HOURS PRN
Qty: 21 TABLET | Refills: 0 | Status: SHIPPED | OUTPATIENT
Start: 2020-09-15 | End: 2021-02-08 | Stop reason: HOSPADM

## 2020-09-15 NOTE — PATIENT INSTRUCTIONS
May use ice or heat as needed    Resume activities as tolerated    Start physical therapy as directed    Follow-up with Orthopedics if symptoms persist or if you have increased tingling pain down into the left arm

## 2020-09-15 NOTE — PROGRESS NOTES
[facility]        NAME: Landen Sosa is a 22 y o  female  : 1995    MRN: [id number]  DATE: September 15, 2020  TIME: 5:36 PM    Assessment and Plan   Cervical strain, acute, initial encounter [S16  1XXA]  1  Cervical strain, acute, initial encounter  XR spine cervical complete 4 or 5 vw non injury    ibuprofen (MOTRIN) 600 mg tablet    metaxalone (SKELAXIN) 800 mg tablet         Patient Instructions       Follow up with PCP in 3-5 days  Proceed to  ER if symptoms worsen  Chief Complaint     Chief Complaint   Patient presents with    Neck Pain     Burning neck pain radiating to pt's left shoulder x 5 days  Pt denies injury  History of Present Illness       Patient for evaluation of pain in her neck  Patient states that few months ago she had something similar and went to physical therapy which did help  At that time she has a more radicular symptoms into the left arm  States this time she started getting the pain in the neck and she does get occasional pain into the left shoulder and occasional tingling in the left forearm  She denies any direct trauma or injury  Review of Systems   Review of Systems   Constitutional: Negative  Musculoskeletal: Positive for neck pain and neck stiffness  Skin: Negative            Current Medications       Current Outpatient Medications:     ibuprofen (MOTRIN) 600 mg tablet, Take 1 tablet (600 mg total) by mouth every 8 (eight) hours for 10 days, Disp: 30 tablet, Rfl: 0    metaxalone (SKELAXIN) 800 mg tablet, Take 1 tablet (800 mg total) by mouth every 8 (eight) hours as needed for muscle spasms, Disp: 21 tablet, Rfl: 0    pantoprazole (PROTONIX) 40 mg tablet, TK 1 T PO QD, Disp: , Rfl:     sucralfate (CARAFATE) 1 g tablet, Take 1 tablet (1 g total) by mouth 4 (four) times a day (Patient not taking: Reported on 9/15/2020), Disp: 20 tablet, Rfl: 0    Current Allergies     Allergies as of 09/15/2020 - Reviewed 09/15/2020   Allergen Reaction Noted    Cashew nut oil  08/06/2019    Azithromycin Rash 11/18/2018    Medical tape Rash 10/28/2019            The following portions of the patient's history were reviewed and updated as appropriate: allergies, current medications, past family history, past medical history, past social history, past surgical history and problem list      Past Medical History:   Diagnosis Date    Known health problems: none     Mitral valve prolapse     Psychiatric disorder     anxiety, depression    Raynaud's disease        Past Surgical History:   Procedure Laterality Date    CARDIAC SURGERY      VULVA SURGERY      WISDOM TOOTH EXTRACTION  06/19/2020       Family History   Problem Relation Age of Onset    Diabetes Father          Medications have been verified  Objective   /67   Pulse 83   Temp 98 4 °F (36 9 °C) (Temporal)   Resp 18   Ht 5' 1" (1 549 m)   Wt 45 9 kg (101 lb 3 2 oz)   LMP 09/08/2020 (Exact Date)   SpO2 99%   BMI 19 12 kg/m²        Physical Exam     Physical Exam  Vitals signs and nursing note reviewed  Constitutional:       General: She is not in acute distress  Appearance: Normal appearance  She is well-developed  She is not ill-appearing, toxic-appearing or diaphoretic  HENT:      Head: Normocephalic and atraumatic  Eyes:      Extraocular Movements: Extraocular movements intact  Conjunctiva/sclera: Conjunctivae normal       Pupils: Pupils are equal, round, and reactive to light  Neck:      Musculoskeletal: Normal range of motion  Muscular tenderness (Left cervical paravertebral muscles and left sternocleidomastoid  Tight muscle bands noted throughout  Pain with Spurling maneuvers on the left  Negative Spurling's on the right ) present  No neck rigidity  Lymphadenopathy:      Cervical: No cervical adenopathy  Skin:     General: Skin is warm and dry  Findings: No rash  Neurological:      General: No focal deficit present        Mental Status: She is alert and oriented to person, place, and time  Psychiatric:         Mood and Affect: Mood normal          Behavior: Behavior normal          Thought Content: Thought content normal          Judgment: Judgment normal       X-ray shows no acute findings other than some mild loss of lordosis

## 2020-09-18 ENCOUNTER — TELEPHONE (OUTPATIENT)
Dept: OBGYN CLINIC | Facility: HOSPITAL | Age: 25
End: 2020-09-18

## 2020-09-18 NOTE — TELEPHONE ENCOUNTER
Nicholas Hawthorne    [phone]    Dr Shamika Horne patient with 10/10 neck pain that goes into her left shoulder  She can not get out of bed  She has been taking Advil which helps some  Your next available appt is 9/29, can you see her sooner?

## 2020-09-18 NOTE — TELEPHONE ENCOUNTER
Aleyda, I placed the pt  On Dr Farrar Cordial schedule fot 09/22/2020 at 2:15 pm is that ok it would make him two new pt  Back to back

## 2020-09-18 NOTE — TELEPHONE ENCOUNTER
Spoke to pt  Who stated she is only having 10 out of 10 pain if she doesn't take the ibuprofen  Pt  Stated  she takes the Ibuprofen it lowers the pain to 6 out of 10   Pt  Is at the paint where she is willing to go to any location   I will schedule pt

## 2020-11-11 ENCOUNTER — OFFICE VISIT (OUTPATIENT)
Dept: URGENT CARE | Facility: MEDICAL CENTER | Age: 25
End: 2020-11-11
Payer: COMMERCIAL

## 2020-11-11 VITALS
OXYGEN SATURATION: 98 % | TEMPERATURE: 98.8 F | WEIGHT: 100 LBS | BODY MASS INDEX: 18.88 KG/M2 | SYSTOLIC BLOOD PRESSURE: 131 MMHG | DIASTOLIC BLOOD PRESSURE: 74 MMHG | HEIGHT: 61 IN | RESPIRATION RATE: 19 BRPM | HEART RATE: 107 BPM

## 2020-11-11 DIAGNOSIS — H65.112 ACUTE MUCOID OTITIS MEDIA OF LEFT EAR: Primary | ICD-10-CM

## 2020-11-11 PROCEDURE — 99213 OFFICE O/P EST LOW 20 MIN: CPT | Performed by: PHYSICIAN ASSISTANT

## 2020-11-11 RX ORDER — AMOXICILLIN 500 MG/1
500 CAPSULE ORAL EVERY 8 HOURS SCHEDULED
Qty: 21 CAPSULE | Refills: 0 | Status: SHIPPED | OUTPATIENT
Start: 2020-11-11 | End: 2020-11-18

## 2020-11-13 ENCOUNTER — NURSE TRIAGE (OUTPATIENT)
Dept: OTHER | Facility: OTHER | Age: 25
End: 2020-11-13

## 2020-11-13 DIAGNOSIS — Z11.59 SPECIAL SCREENING EXAMINATION FOR UNSPECIFIED VIRAL DISEASE: ICD-10-CM

## 2020-11-13 DIAGNOSIS — Z11.59 SPECIAL SCREENING EXAMINATION FOR UNSPECIFIED VIRAL DISEASE: Primary | ICD-10-CM

## 2020-11-13 PROCEDURE — U0003 INFECTIOUS AGENT DETECTION BY NUCLEIC ACID (DNA OR RNA); SEVERE ACUTE RESPIRATORY SYNDROME CORONAVIRUS 2 (SARS-COV-2) (CORONAVIRUS DISEASE [COVID-19]), AMPLIFIED PROBE TECHNIQUE, MAKING USE OF HIGH THROUGHPUT TECHNOLOGIES AS DESCRIBED BY CMS-2020-01-R: HCPCS | Performed by: FAMILY MEDICINE

## 2020-11-15 LAB — SARS-COV-2 RNA SPEC QL NAA+PROBE: NOT DETECTED

## 2020-11-18 ENCOUNTER — OFFICE VISIT (OUTPATIENT)
Dept: OBGYN CLINIC | Facility: CLINIC | Age: 25
End: 2020-11-18
Payer: COMMERCIAL

## 2020-11-18 VITALS
HEIGHT: 61 IN | SYSTOLIC BLOOD PRESSURE: 104 MMHG | DIASTOLIC BLOOD PRESSURE: 72 MMHG | WEIGHT: 100 LBS | TEMPERATURE: 98.7 F | BODY MASS INDEX: 18.88 KG/M2 | HEART RATE: 72 BPM

## 2020-11-18 DIAGNOSIS — M54.12 RADICULOPATHY, CERVICAL REGION: Primary | ICD-10-CM

## 2020-11-18 PROCEDURE — 99203 OFFICE O/P NEW LOW 30 MIN: CPT | Performed by: FAMILY MEDICINE

## 2020-11-18 RX ORDER — GABAPENTIN 300 MG/1
300 CAPSULE ORAL
Qty: 30 CAPSULE | Refills: 1 | Status: SHIPPED | OUTPATIENT
Start: 2020-11-18 | End: 2020-12-03

## 2020-11-19 ENCOUNTER — TELEPHONE (OUTPATIENT)
Dept: OBGYN CLINIC | Facility: HOSPITAL | Age: 25
End: 2020-11-19

## 2020-11-29 ENCOUNTER — HOSPITAL ENCOUNTER (OUTPATIENT)
Dept: MRI IMAGING | Facility: HOSPITAL | Age: 25
Discharge: HOME | End: 2020-11-29
Attending: FAMILY MEDICINE
Payer: COMMERCIAL

## 2020-11-29 DIAGNOSIS — M54.12 RADICULOPATHY, CERVICAL REGION: ICD-10-CM

## 2020-11-29 PROCEDURE — 72141 MRI NECK SPINE W/O DYE: CPT

## 2020-11-29 PROCEDURE — G1004 CDSM NDSC: HCPCS

## 2020-12-01 ENCOUNTER — EVALUATION (OUTPATIENT)
Dept: PHYSICAL THERAPY | Facility: CLINIC | Age: 25
End: 2020-12-01
Payer: COMMERCIAL

## 2020-12-01 DIAGNOSIS — M54.12 RADICULOPATHY, CERVICAL REGION: Primary | ICD-10-CM

## 2020-12-01 PROCEDURE — 97112 NEUROMUSCULAR REEDUCATION: CPT | Performed by: PHYSICAL THERAPIST

## 2020-12-01 PROCEDURE — 97140 MANUAL THERAPY 1/> REGIONS: CPT | Performed by: PHYSICAL THERAPIST

## 2020-12-01 PROCEDURE — 97162 PT EVAL MOD COMPLEX 30 MIN: CPT | Performed by: PHYSICAL THERAPIST

## 2020-12-03 ENCOUNTER — OFFICE VISIT (OUTPATIENT)
Dept: OBGYN CLINIC | Facility: CLINIC | Age: 25
End: 2020-12-03
Payer: COMMERCIAL

## 2020-12-03 ENCOUNTER — OFFICE VISIT (OUTPATIENT)
Dept: PHYSICAL THERAPY | Facility: CLINIC | Age: 25
End: 2020-12-03
Payer: COMMERCIAL

## 2020-12-03 VITALS
SYSTOLIC BLOOD PRESSURE: 106 MMHG | WEIGHT: 100 LBS | DIASTOLIC BLOOD PRESSURE: 72 MMHG | BODY MASS INDEX: 18.88 KG/M2 | HEART RATE: 86 BPM | HEIGHT: 61 IN

## 2020-12-03 DIAGNOSIS — M79.18 MYOFASCIAL PAIN SYNDROME OF THORACIC SPINE: ICD-10-CM

## 2020-12-03 DIAGNOSIS — R29.3 POSTURE IMBALANCE: ICD-10-CM

## 2020-12-03 DIAGNOSIS — M79.18 CERVICAL MYOFASCIAL PAIN SYNDROME: ICD-10-CM

## 2020-12-03 DIAGNOSIS — S16.1XXD CERVICAL STRAIN, SUBSEQUENT ENCOUNTER: Primary | ICD-10-CM

## 2020-12-03 DIAGNOSIS — M54.12 RADICULOPATHY, CERVICAL REGION: Primary | ICD-10-CM

## 2020-12-03 DIAGNOSIS — S29.012A STRAIN OF THORACIC BACK REGION: ICD-10-CM

## 2020-12-03 PROCEDURE — 97112 NEUROMUSCULAR REEDUCATION: CPT

## 2020-12-03 PROCEDURE — 97140 MANUAL THERAPY 1/> REGIONS: CPT

## 2020-12-03 PROCEDURE — 97110 THERAPEUTIC EXERCISES: CPT

## 2020-12-03 PROCEDURE — 99213 OFFICE O/P EST LOW 20 MIN: CPT | Performed by: FAMILY MEDICINE

## 2020-12-07 ENCOUNTER — OFFICE VISIT (OUTPATIENT)
Dept: PHYSICAL THERAPY | Facility: CLINIC | Age: 25
End: 2020-12-07
Payer: COMMERCIAL

## 2020-12-07 DIAGNOSIS — M54.12 RADICULOPATHY, CERVICAL REGION: Primary | ICD-10-CM

## 2020-12-07 PROCEDURE — 97110 THERAPEUTIC EXERCISES: CPT | Performed by: PHYSICAL THERAPIST

## 2020-12-07 PROCEDURE — 97112 NEUROMUSCULAR REEDUCATION: CPT | Performed by: PHYSICAL THERAPIST

## 2020-12-07 PROCEDURE — 97140 MANUAL THERAPY 1/> REGIONS: CPT | Performed by: PHYSICAL THERAPIST

## 2020-12-09 ENCOUNTER — OFFICE VISIT (OUTPATIENT)
Dept: PHYSICAL THERAPY | Facility: CLINIC | Age: 25
End: 2020-12-09
Payer: COMMERCIAL

## 2020-12-09 DIAGNOSIS — M54.12 CERVICAL RADICULOPATHY: ICD-10-CM

## 2020-12-09 DIAGNOSIS — M54.12 RADICULOPATHY, CERVICAL REGION: Primary | ICD-10-CM

## 2020-12-09 PROCEDURE — 97112 NEUROMUSCULAR REEDUCATION: CPT

## 2020-12-09 PROCEDURE — 97110 THERAPEUTIC EXERCISES: CPT

## 2020-12-11 ENCOUNTER — TELEPHONE (OUTPATIENT)
Dept: OBGYN CLINIC | Facility: HOSPITAL | Age: 25
End: 2020-12-11

## 2020-12-14 ENCOUNTER — OFFICE VISIT (OUTPATIENT)
Dept: PHYSICAL THERAPY | Facility: CLINIC | Age: 25
End: 2020-12-14
Payer: COMMERCIAL

## 2020-12-14 DIAGNOSIS — M54.12 RADICULOPATHY, CERVICAL REGION: Primary | ICD-10-CM

## 2020-12-14 DIAGNOSIS — M54.12 CERVICAL RADICULOPATHY: ICD-10-CM

## 2020-12-14 PROCEDURE — 97140 MANUAL THERAPY 1/> REGIONS: CPT

## 2020-12-14 PROCEDURE — 97110 THERAPEUTIC EXERCISES: CPT

## 2020-12-14 PROCEDURE — 97112 NEUROMUSCULAR REEDUCATION: CPT

## 2020-12-17 ENCOUNTER — OFFICE VISIT (OUTPATIENT)
Dept: PHYSICAL THERAPY | Facility: CLINIC | Age: 25
End: 2020-12-17
Payer: COMMERCIAL

## 2020-12-17 DIAGNOSIS — M54.12 CERVICAL RADICULOPATHY: ICD-10-CM

## 2020-12-17 DIAGNOSIS — M54.12 RADICULOPATHY, CERVICAL REGION: Primary | ICD-10-CM

## 2020-12-17 PROCEDURE — 97110 THERAPEUTIC EXERCISES: CPT | Performed by: PHYSICAL THERAPIST

## 2020-12-17 PROCEDURE — 97140 MANUAL THERAPY 1/> REGIONS: CPT | Performed by: PHYSICAL THERAPIST

## 2020-12-21 ENCOUNTER — OFFICE VISIT (OUTPATIENT)
Dept: PHYSICAL THERAPY | Facility: CLINIC | Age: 25
End: 2020-12-21
Payer: COMMERCIAL

## 2020-12-21 DIAGNOSIS — M54.12 RADICULOPATHY, CERVICAL REGION: Primary | ICD-10-CM

## 2020-12-21 DIAGNOSIS — M54.12 CERVICAL RADICULOPATHY: ICD-10-CM

## 2020-12-21 PROCEDURE — 97112 NEUROMUSCULAR REEDUCATION: CPT

## 2020-12-21 PROCEDURE — 97140 MANUAL THERAPY 1/> REGIONS: CPT

## 2020-12-21 PROCEDURE — 97110 THERAPEUTIC EXERCISES: CPT

## 2020-12-23 ENCOUNTER — OFFICE VISIT (OUTPATIENT)
Dept: PHYSICAL THERAPY | Facility: CLINIC | Age: 25
End: 2020-12-23
Payer: COMMERCIAL

## 2020-12-23 DIAGNOSIS — M54.12 RADICULOPATHY, CERVICAL REGION: Primary | ICD-10-CM

## 2020-12-23 DIAGNOSIS — M54.12 CERVICAL RADICULOPATHY: ICD-10-CM

## 2020-12-23 PROCEDURE — 97140 MANUAL THERAPY 1/> REGIONS: CPT

## 2020-12-23 PROCEDURE — 97110 THERAPEUTIC EXERCISES: CPT

## 2020-12-23 PROCEDURE — 97112 NEUROMUSCULAR REEDUCATION: CPT

## 2020-12-28 ENCOUNTER — APPOINTMENT (OUTPATIENT)
Dept: PHYSICAL THERAPY | Facility: CLINIC | Age: 25
End: 2020-12-28
Payer: COMMERCIAL

## 2020-12-28 NOTE — PROGRESS NOTES
PT Evaluation     Today's date: 2020  Patient name: Levar Blount  : 1995  MRN: [id number]  Referring provider: London Bass DO  Dx:   Encounter Diagnosis     ICD-10-CM    1  Radiculopathy, cervical region  M54 12    2  Cervical radiculopathy  M54 12                   Assessment  Assessment details: Levar Blount is a 26 y o  female referred with primary diagnosis of Radiculopathy, cervical region  (primary encounter diagnosis)   Patient presents with the following functional limitations: pain with prolonged sitting, looking down and by end of the day  She has mild limitations into cervical rotation bilaterally and demonstrates a mild Forward head posture  Symptoms into lower cervical and upper thoracic region were abolished with repeated retractions  (+) median neural tension bilaterally  Patient was instructed in postural correction, median nerve glides and retractions with overpressure  Treatment to include: Manual therapy techniques, extremity/core strengthening, neuromuscular control exercises, instruction in a comprehensive HEP, and modalities as needed  They will benefit from skilled PT services to address the above functional deficits and to decrease pain to promote a return to their premorbid level of function  Impairments: abnormal or restricted ROM, pain with function and poor body mechanics  Functional limitations: pain with prolonged sitting, looking down and by end of the day  Understanding of Dx/Px/POC: good   Prognosis: good  Prognosis details: Chronic pain x 3 months    Goals  STG (4 weeks)  1  Patient will report pain as a 5/10 at worst  2  Patient will report ability to sit without increased pain  3  Patient will demonstrate cervical ROM WFL  LTG (8 weeks)  1  Patient will report pain as a 0-1/10 at worst  2  Patient will resume exercising without increased pain  3   Patient will demonstrate good postural awareness at all times to prevent exacerbation of symptoms    Plan  Patient would benefit from: skilled physical therapy  Planned modality interventions: thermotherapy: hydrocollator packs  Planned therapy interventions: abdominal trunk stabilization, manual therapy, joint mobilization, neuromuscular re-education, patient education, strengthening, stretching, therapeutic activities, therapeutic exercise and home exercise program  Frequency: 2x week  Duration in weeks: 8  Plan of Care beginning date: 2020  Plan of Care expiration date: 2021  Treatment plan discussed with: patient        Subjective Evaluation    History of Present Illness  Mechanism of injury: Patient states in early September she was looking down a lot and started having upper back pain  After taking medication and resting for a week it got better  A week later, symptoms returned and she reports it had a burning sensation along upper thoracic spine  She is now more than 2 months since onset of symptoms and has not improved despite conservative management of being on Skelaxin 800 mg 3 times a day since 09/15/2020, taking ibuprofen 600 mg 3 times a day since 2020, attending formal physical therapy and doing home exercises since 10/02/2020  Saw Dr Samina Sierra and was referred for an MRI which was negative for cervical pathology or radiculopathy  Has not taken her Gabapentin  She is referred now to outpatient PT services  Recurrent probem    Quality of life: good    Pain  Current pain ratin  At best pain ratin  At worst pain rating: 10  Location: Upper thoracic spine, bilateral UT and into left anerolateral brachium into her forearm and occasionally into right forearm  Quality: dull ache, sharp and burning  Relieving factors: heat  Progression: worsening    Social Support  Lives with: significant other    Employment status: not working  Hand dominance: right  Exercise history: Has not been exercising or walking for the past 3 months        Diagnostic Tests  MRI studies: normal    FCE comments: Symptoms can last for a few hours or the entire day  Denies UE weakness  (-) sleep disturbances  Treatments  Previous treatment: physical therapy and medication  Current treatment: massage  Patient Goals  Patient goals for therapy: decreased pain          Objective     Palpation   Left   No palpable tenderness to the suboccipitals  Tenderness of the cervical paraspinals and upper trapezius  Right   No palpable tenderness to the suboccipitals  Tenderness of the cervical paraspinals and upper trapezius  Active Range of Motion   Cervical/Thoracic Spine       Cervical    Flexion: 55 degrees   Extension: 65 degrees      Left lateral flexion: 40 degrees      Right lateral flexion: 40 degrees      Left rotation: 71 degrees  Right rotation: 61 degrees             Joint Play   Joints within functional limits: C2, C3, C4, C5, C6, C7, T1, T2, T3, T4 and T5   Mechanical Assessment    Cervical    Seated retraction: repeated movements   Pain location: centralized  Pain intensity: better  Pain level: abolished  Supine retractation: repeated movements  Pain location: centralized  Pain intensity: better  Pain level: decreased    Thoracic      Lumbar      Strength/Myotome Testing   Cervical Spine     Left   Normal strength    Right   Normal strength    Tests   Cervical   Positive lumbar distraction test   Negative repeated extension and repeated flexion  Left   Negative Spurling's Test A  Right   Negative Spurling's Test A  Left Shoulder   Positive ULTT1  Right Shoulder   Positive ULTT1  Precautions: Raynaud's, MVP, Anxiety, Depression    Manuals 12/1 12/3 12/7 12/9 12/14 12/17 12/21 12/23     Cervical traction JF SC JF SC HS JF SC JM     Loaded moderate retractions 4x10            Unloaded moderate retractions x10            Manually unloaded moderate retractions x10            Neuro Re-Ed             Pt education Centralization and peripheralization  Postural correction  Webslide rows H,M,L  RTB 10x  RTB 2x10 RTB 3x 10  RTB 3X10 GTB 2x10 GTB 3X 10 GTB 3x10     Liz Press  RTB 10" x 10  RTB 10" x 10  RTB 10" x 10  RTB 10"x10 GTB 10"x10 GTB  2x 10  GTB  3x10     PPT with march L2 NV  x20          T-band shoulder ER haroon   RTB 3" x20 RTB 3x 10 3"  RTB 3" 3x10 RTB 3" 3x10 np       Wall scap push ups     2x10 2x10 2x 10  2x10     sahrman upper back at wall      10"x10 10" x 10  10"  x10     Ther Ex             UBE     standing 5'/5' standing 5'/5' standing 5'/5'  standing 5'/5'     Cervical retractions with overpressure Instructed  reviewed w/o overpressure  Haroon  Median nerve glides Instructed reviewed            Corner pec stretch  30" x 3  30" x 3  30" x 3  30"x3 30"x3 30" x 3  30"x3     Prone horiz  ABD thumb up and palm down  10x ea    x10 ea 2x 10   2x10  3x 10  3x10     Prone rows haroon   2# 2x10 2# 2x 10  2# 2x10  2# 3x 10  2#  3x10                                            Ther Activity                                       Gait Training                                       Modalities             MHP prn c-spine and thoracic spine

## 2020-12-30 ENCOUNTER — APPOINTMENT (OUTPATIENT)
Dept: PHYSICAL THERAPY | Facility: CLINIC | Age: 25
End: 2020-12-30
Payer: COMMERCIAL

## 2021-01-10 ENCOUNTER — APPOINTMENT (EMERGENCY)
Dept: RADIOLOGY | Facility: HOSPITAL | Age: 26
End: 2021-01-10
Payer: COMMERCIAL

## 2021-01-10 ENCOUNTER — HOSPITAL ENCOUNTER (EMERGENCY)
Facility: HOSPITAL | Age: 26
Discharge: LEFT BEFORE DISCHARGE | End: 2021-01-11
Payer: COMMERCIAL

## 2021-01-10 VITALS
HEART RATE: 107 BPM | OXYGEN SATURATION: 100 % | DIASTOLIC BLOOD PRESSURE: 78 MMHG | RESPIRATION RATE: 19 BRPM | TEMPERATURE: 97.4 F | SYSTOLIC BLOOD PRESSURE: 131 MMHG

## 2021-01-10 LAB
ALBUMIN SERPL BCP-MCNC: 3.9 G/DL (ref 3.5–5)
ALP SERPL-CCNC: 77 U/L (ref 46–116)
ALT SERPL W P-5'-P-CCNC: 27 U/L (ref 12–78)
ANION GAP SERPL CALCULATED.3IONS-SCNC: 8 MMOL/L (ref 4–13)
AST SERPL W P-5'-P-CCNC: 18 U/L (ref 5–45)
BASOPHILS # BLD AUTO: 0.03 THOUSANDS/ΜL (ref 0–0.1)
BASOPHILS NFR BLD AUTO: 0 % (ref 0–1)
BILIRUB SERPL-MCNC: 0.2 MG/DL (ref 0.2–1)
BUN SERPL-MCNC: 16 MG/DL (ref 5–25)
CALCIUM SERPL-MCNC: 8.9 MG/DL (ref 8.3–10.1)
CHLORIDE SERPL-SCNC: 102 MMOL/L (ref 100–108)
CO2 SERPL-SCNC: 29 MMOL/L (ref 21–32)
CREAT SERPL-MCNC: 0.82 MG/DL (ref 0.6–1.3)
EOSINOPHIL # BLD AUTO: 0.06 THOUSAND/ΜL (ref 0–0.61)
EOSINOPHIL NFR BLD AUTO: 1 % (ref 0–6)
ERYTHROCYTE [DISTWIDTH] IN BLOOD BY AUTOMATED COUNT: 12.1 % (ref 11.6–15.1)
GFR SERPL CREATININE-BSD FRML MDRD: 100 ML/MIN/1.73SQ M
GLUCOSE SERPL-MCNC: 108 MG/DL (ref 65–140)
HCT VFR BLD AUTO: 42 % (ref 34.8–46.1)
HGB BLD-MCNC: 14.4 G/DL (ref 11.5–15.4)
IMM GRANULOCYTES # BLD AUTO: 0.02 THOUSAND/UL (ref 0–0.2)
IMM GRANULOCYTES NFR BLD AUTO: 0 % (ref 0–2)
LYMPHOCYTES # BLD AUTO: 3.41 THOUSANDS/ΜL (ref 0.6–4.47)
LYMPHOCYTES NFR BLD AUTO: 34 % (ref 14–44)
MCH RBC QN AUTO: 32.1 PG (ref 26.8–34.3)
MCHC RBC AUTO-ENTMCNC: 34.3 G/DL (ref 31.4–37.4)
MCV RBC AUTO: 94 FL (ref 82–98)
MONOCYTES # BLD AUTO: 0.73 THOUSAND/ΜL (ref 0.17–1.22)
MONOCYTES NFR BLD AUTO: 7 % (ref 4–12)
NEUTROPHILS # BLD AUTO: 5.91 THOUSANDS/ΜL (ref 1.85–7.62)
NEUTS SEG NFR BLD AUTO: 58 % (ref 43–75)
NRBC BLD AUTO-RTO: 0 /100 WBCS
PLATELET # BLD AUTO: 239 THOUSANDS/UL (ref 149–390)
PMV BLD AUTO: 9.8 FL (ref 8.9–12.7)
POTASSIUM SERPL-SCNC: 3.1 MMOL/L (ref 3.5–5.3)
PROT SERPL-MCNC: 8 G/DL (ref 6.4–8.2)
RBC # BLD AUTO: 4.49 MILLION/UL (ref 3.81–5.12)
SODIUM SERPL-SCNC: 139 MMOL/L (ref 136–145)
TROPONIN I SERPL-MCNC: <0.02 NG/ML
WBC # BLD AUTO: 10.16 THOUSAND/UL (ref 4.31–10.16)

## 2021-01-10 PROCEDURE — 84484 ASSAY OF TROPONIN QUANT: CPT

## 2021-01-10 PROCEDURE — 93005 ELECTROCARDIOGRAM TRACING: CPT

## 2021-01-10 PROCEDURE — 85025 COMPLETE CBC W/AUTO DIFF WBC: CPT

## 2021-01-10 PROCEDURE — 36415 COLL VENOUS BLD VENIPUNCTURE: CPT

## 2021-01-10 PROCEDURE — 71045 X-RAY EXAM CHEST 1 VIEW: CPT

## 2021-01-10 PROCEDURE — 80053 COMPREHEN METABOLIC PANEL: CPT

## 2021-01-11 ENCOUNTER — TRANSCRIBE ORDERS (OUTPATIENT)
Dept: LAB | Facility: HOSPITAL | Age: 26
End: 2021-01-11

## 2021-01-12 LAB
ATRIAL RATE: 102 BPM
P AXIS: 69 DEGREES
PR INTERVAL: 116 MS
QRS AXIS: 78 DEGREES
QRSD INTERVAL: 76 MS
QT INTERVAL: 328 MS
QTC INTERVAL: 427 MS
T WAVE AXIS: -31 DEGREES
VENTRICULAR RATE: 102 BPM

## 2021-01-12 PROCEDURE — 93010 ELECTROCARDIOGRAM REPORT: CPT | Performed by: INTERNAL MEDICINE

## 2021-01-14 ENCOUNTER — OFFICE VISIT (OUTPATIENT)
Dept: OBGYN CLINIC | Facility: CLINIC | Age: 26
End: 2021-01-14
Payer: COMMERCIAL

## 2021-01-14 VITALS
SYSTOLIC BLOOD PRESSURE: 115 MMHG | BODY MASS INDEX: 18.88 KG/M2 | DIASTOLIC BLOOD PRESSURE: 81 MMHG | HEART RATE: 112 BPM | HEIGHT: 61 IN | WEIGHT: 100 LBS

## 2021-01-14 DIAGNOSIS — S16.1XXD CERVICAL STRAIN, SUBSEQUENT ENCOUNTER: Primary | ICD-10-CM

## 2021-01-14 DIAGNOSIS — S29.012A STRAIN OF THORACIC BACK REGION: ICD-10-CM

## 2021-01-14 DIAGNOSIS — M79.18 MYOFASCIAL PAIN SYNDROME OF THORACIC SPINE: ICD-10-CM

## 2021-01-14 DIAGNOSIS — M79.18 CERVICAL MYOFASCIAL PAIN SYNDROME: ICD-10-CM

## 2021-01-14 DIAGNOSIS — R29.3 POSTURE IMBALANCE: ICD-10-CM

## 2021-01-14 PROCEDURE — 99213 OFFICE O/P EST LOW 20 MIN: CPT | Performed by: FAMILY MEDICINE

## 2021-01-14 NOTE — PROGRESS NOTES
Assessment/Plan:  Assessment/Plan   Diagnoses and all orders for this visit:    Cervical strain, subsequent encounter    Strain of thoracic back region    Myofascial pain syndrome of thoracic spine    Cervical myofascial pain syndrome    Posture imbalance          26year-old active right-hand-dominant female with neck and bilateral extremity pain of more than 4 months duration  Discussed with patient physical exam, impression and plan  She has normal range of motion of cervical spine and normal strength and sensation throughout both lower extremities  Clinical impression is that she is significantly improved regard to muscle/myofascial strain in the cervical and thoracic spines  Recommend she continue with home exercises and posture maintenance  She will follow up as needed  Subjective:   Patient ID: Kimberley Valenzuela is a 26 y o  female  Chief Complaint   Patient presents with    Neck - Follow-up       52-year-old active right-hand-dominant female following up for neck and bilateral upper extremity pain of more than 4 months duration  She was last seen by me nearly 6 weeks ago at which point MRI of her cervical spine reviewed with her was unremarkable  She was advised to continue with formal physical therapy and home exercises  She reports significant improvement since her last visit and currently denies any pain  She states it has been about 1 week since she experience any pain in the neck or upper back  She states that she was previously experiencing worsening pain for about 2 days following PT sessions  For the past few weeks she has been continuing with home exercises on her own but focusing more on posture changes  She reports that after 1 week of changing her sitting posture and doing chin tucks while sitting her pain significantly improved  Neck Pain  This is a new problem  The current episode started more than 1 month ago  The problem has been resolved   Pertinent negatives include no arthralgias, neck pain, numbness or weakness  Nothing aggravates the symptoms  She has tried rest (Physical therapy, home exercise) for the symptoms  The treatment provided significant relief  Review of Systems   Musculoskeletal: Negative for arthralgias and neck pain  Neurological: Negative for weakness and numbness  Objective:  Vitals:    01/14/21 1514   BP: 115/81   Pulse: (!) 112   Weight: 45 4 kg (100 lb)   Height: 5' 1" (1 549 m)     Ortho Exam    Physical Exam  Vitals signs and nursing note reviewed  Constitutional:       General: She is not in acute distress  Appearance: She is well-developed  HENT:      Head: Normocephalic  Right Ear: External ear normal       Left Ear: External ear normal    Eyes:      Conjunctiva/sclera: Conjunctivae normal    Neck:      Trachea: No tracheal deviation  Cardiovascular:      Comments: Tachycardic  Pulmonary:      Effort: Pulmonary effort is normal  No respiratory distress  Abdominal:      General: There is no distension  Skin:     General: Skin is warm and dry  Neurological:      Mental Status: She is alert and oriented to person, place, and time     Psychiatric:         Behavior: Behavior normal

## 2021-01-16 ENCOUNTER — NURSE TRIAGE (OUTPATIENT)
Dept: OTHER | Facility: OTHER | Age: 26
End: 2021-01-16

## 2021-01-16 DIAGNOSIS — Z11.59 SPECIAL SCREENING EXAMINATION FOR VIRAL DISEASE: Primary | ICD-10-CM

## 2021-01-16 NOTE — TELEPHONE ENCOUNTER
Reason for Disposition   [1] COVID-19 diagnosed by positive lab test AND [2] mild symptoms (e g , cough, fever, others) AND [8] no complications or SOB    Answer Assessment - Initial Assessment Questions  1  COVID-19 DIAGNOSIS: "Who made your Coronavirus (COVID-19) diagnosis?" "Was it confirmed by a positive lab test?" If not diagnosed by a HCP, ask "Are there lots of cases (community spread) where you live?" (See public health department website, if unsure)      Pt having symptoms of covid and want to be tested    2  COVID-19 EXPOSURE: "Was there any known exposure to COVID before the symptoms began?" [facility] Definition of close contact: within 6 feet (2 meters) for a total of 15 minutes or more over a 24-hour period  Unsure but went to hospital recently and could have been exposed   3  ONSET: "When did the COVID-19 symptoms start?"       1/14/21  4  WORST SYMPTOM: "What is your worst symptom?" (e g , cough, fever, shortness of breath, muscle aches)      Nauseous, achy, headaches    5  COUGH: "Do you have a cough?" If so, ask: "How bad is the cough?"        no  6  FEVER: "Do you have a fever?" If so, ask: "What is your temperature, how was it measured, and when did it start?"      no  7  RESPIRATORY STATUS: "Describe your breathing?" (e g , shortness of breath, wheezing, unable to speak)       No difficulty breathing   8  BETTER-SAME-WORSE: "Are you getting better, staying the same or getting worse compared to yesterday?"  If getting worse, ask, "In what way?"      Feels better today   9  HIGH RISK DISEASE: "Do you have any chronic medical problems?" (e g , asthma, heart or lung disease, weak immune system, obesity, etc )      no  10  PREGNANCY: "Is there any chance you are pregnant?" "When was your last menstrual period?"        no  11   OTHER SYMPTOMS: "Do you have any other symptoms?"  (e g , chills, fatigue, headache, loss of smell or taste, muscle pain, sore throat; new loss of smell or taste especially support the diagnosis of COVID-19)        no    Protocols used: CORONAVIRUS (COVID-19) DIAGNOSED OR SUSPECTED-ADULT-AH

## 2021-01-16 NOTE — TELEPHONE ENCOUNTER
Regarding: COVID - Symptomatic - body aches   ----- Message from Ivan Benavides sent at 1/16/2021  2:47 PM EST -----  " I want to be tested for COVID, I am not feeling well for the past 2 days   I have body aches, headaches, night sweats"

## 2021-01-19 ENCOUNTER — OFFICE VISIT (OUTPATIENT)
Dept: URGENT CARE | Facility: MEDICAL CENTER | Age: 26
End: 2021-01-19
Payer: COMMERCIAL

## 2021-01-19 VITALS — BODY MASS INDEX: 18.88 KG/M2 | HEIGHT: 61 IN | WEIGHT: 100 LBS

## 2021-01-19 DIAGNOSIS — B34.9 ACUTE VIRAL SYNDROME: Primary | ICD-10-CM

## 2021-01-19 PROCEDURE — 87637 SARSCOV2&INF A&B&RSV AMP PRB: CPT | Performed by: PHYSICIAN ASSISTANT

## 2021-01-19 PROCEDURE — 99213 OFFICE O/P EST LOW 20 MIN: CPT | Performed by: PHYSICIAN ASSISTANT

## 2021-01-19 RX ORDER — POTASSIUM CHLORIDE 1.5 G/1.77G
20 POWDER, FOR SOLUTION ORAL DAILY
COMMUNITY
Start: 2021-01-14 | End: 2021-01-28

## 2021-01-19 NOTE — LETTER
January 19, 2021     Patient: Maddie Woods   YOB: 1995   Date of Visit: 1/19/2021       To Whom it May Concern:    Maddie Woods was seen in my clinic on 1/19/2021  Please excuse from work until results available    If you have any questions or concerns, please don't hesitate to call           Sincerely,          Minda Godinez PA-C        CC: Maddie Woods

## 2021-01-19 NOTE — PROGRESS NOTES
[address] [facility]        NAME: Stacy Holcomb is a 26 y o  female  : 1995    MRN: [id number]  DATE: 2021  TIME: 3:25 PM    Assessment and Plan   Acute viral syndrome [B34 9]  1  Acute viral syndrome  Novel Coronavirus 2019(COVID-19), Influenza A/B, RSV [facility]     COVID-19 swab performed due to symptoms of fatigue, body aches, headache, nausea  Just had her LMP so no concern for pregnancy  Recommended isolating until results available  Advised she may go get outpatient lab work done if COVID testing negative as she does have scripts to check her thyroid  Patient Instructions   Tylenol or Motrin for pain  Rest and drink plenty of fluids  Isolate until results available  Follow up with PCP in 3-5 days  Proceed to  ER if symptoms worsen  Chief Complaint     Chief Complaint   Patient presents with    Fatigue     C/o being exhausted x 10 days   Flank Pain     Left side flank pain today  History of Present Illness       Patient is a 23-year-old female who presents today with complaints of fatigue for the past week and a half  She has also had 5 days of headache, nausea, body aches  She has had some pain on the left side today  No fevers, chills, sore throat  Is currently being seen for tachycardia, does have lab scripts to get drawn  She did ask for a COVID test 5 days ago and called the hotline based on her symptoms, never got the testing done and has been in public  No shortness of breath but does report palpitations  Denies urinary symptoms  Review of Systems   Review of Systems   Constitutional: Positive for fatigue  Negative for chills and fever  HENT: Negative for congestion and sore throat  Respiratory: Negative for cough and shortness of breath  Cardiovascular: Positive for palpitations  Negative for chest pain  Gastrointestinal: Positive for nausea  Negative for abdominal pain and vomiting     Genitourinary: Positive for flank pain  Negative for dysuria and hematuria  Musculoskeletal: Positive for myalgias  Neurological: Positive for headaches  Current Medications       Current Outpatient Medications:     potassium chloride (KLOR-CON) 20 mEq packet, Take 20 mEq by mouth daily, Disp: , Rfl:     ibuprofen (MOTRIN) 600 mg tablet, Take 1 tablet (600 mg total) by mouth every 8 (eight) hours for 10 days, Disp: 30 tablet, Rfl: 0    metaxalone (SKELAXIN) 800 mg tablet, Take 1 tablet (800 mg total) by mouth every 8 (eight) hours as needed for muscle spasms (Patient not taking: Reported on 11/18/2020), Disp: 21 tablet, Rfl: 0    pantoprazole (PROTONIX) 40 mg tablet, TK 1 T PO QD, Disp: , Rfl:     sucralfate (CARAFATE) 1 g tablet, Take 1 tablet (1 g total) by mouth 4 (four) times a day (Patient not taking: Reported on 9/15/2020), Disp: 20 tablet, Rfl: 0    Current Allergies     Allergies as of 01/19/2021 - Reviewed 01/19/2021   Allergen Reaction Noted    Cashew nut oil  05/19/2018    Azithromycin Rash and Hives 05/24/2018    Medical tape Rash 10/28/2019            The following portions of the patient's history were reviewed and updated as appropriate: allergies, current medications, past family history, past medical history, past social history, past surgical history and problem list      Past Medical History:   Diagnosis Date    Known health problems: none     Mitral valve prolapse     Psychiatric disorder     anxiety, depression    Raynaud's disease        Past Surgical History:   Procedure Laterality Date    VULVA SURGERY      WISDOM TOOTH EXTRACTION  06/19/2020       Family History   Problem Relation Age of Onset    Diabetes Father          Medications have been verified  Objective   Ht 5' 1" (1 549 m)   Wt 45 4 kg (100 lb)   LMP 01/17/2021 (Exact Date)   BMI 18 89 kg/m²        Physical Exam     Physical Exam  Constitutional:       General: She is not in acute distress       Appearance: Normal appearance  She is normal weight  She is not ill-appearing  HENT:      Nose: Nose normal       Mouth/Throat:      Mouth: Mucous membranes are moist       Pharynx: Oropharynx is clear  No posterior oropharyngeal erythema  Eyes:      Conjunctiva/sclera: Conjunctivae normal       Pupils: Pupils are equal, round, and reactive to light  Neck:      Musculoskeletal: Neck supple  Cardiovascular:      Rate and Rhythm: Regular rhythm  Tachycardia present  Pulmonary:      Effort: Pulmonary effort is normal       Breath sounds: Normal breath sounds  Abdominal:      General: Bowel sounds are normal  There is no distension  Palpations: Abdomen is soft  Tenderness: There is no abdominal tenderness  There is no right CVA tenderness  Lymphadenopathy:      Cervical: No cervical adenopathy  Skin:     General: Skin is warm and dry  Neurological:      Mental Status: She is alert

## 2021-01-21 LAB
FLUAV RNA NPH QL NAA+PROBE: NOT DETECTED
FLUBV RNA NPH QL NAA+PROBE: NOT DETECTED
RSV RNA NPH QL NAA+PROBE: NOT DETECTED
SARS-COV-2 RNA NPH QL NAA+PROBE: NOT DETECTED

## 2021-02-08 ENCOUNTER — OFFICE VISIT (OUTPATIENT)
Dept: FAMILY MEDICINE CLINIC | Facility: CLINIC | Age: 26
End: 2021-02-08
Payer: COMMERCIAL

## 2021-02-08 VITALS
SYSTOLIC BLOOD PRESSURE: 120 MMHG | BODY MASS INDEX: 20.05 KG/M2 | OXYGEN SATURATION: 98 % | TEMPERATURE: 99.5 F | HEIGHT: 61 IN | HEART RATE: 93 BPM | WEIGHT: 106.2 LBS | DIASTOLIC BLOOD PRESSURE: 80 MMHG

## 2021-02-08 DIAGNOSIS — R09.82 POST-NASAL DRIP: ICD-10-CM

## 2021-02-08 DIAGNOSIS — F41.1 GENERALIZED ANXIETY DISORDER WITH PANIC ATTACKS: ICD-10-CM

## 2021-02-08 DIAGNOSIS — F41.0 GENERALIZED ANXIETY DISORDER WITH PANIC ATTACKS: ICD-10-CM

## 2021-02-08 DIAGNOSIS — Z00.00 ENCOUNTER FOR WELL ADULT EXAM WITHOUT ABNORMAL FINDINGS: Primary | ICD-10-CM

## 2021-02-08 PROBLEM — R00.2 PALPITATIONS: Status: ACTIVE | Noted: 2019-02-21

## 2021-02-08 PROBLEM — I34.1 MITRAL VALVE PROLAPSE: Status: ACTIVE | Noted: 2019-02-21

## 2021-02-08 PROBLEM — F13.21 SEDATIVE, HYPNOTIC OR ANXIOLYTIC DEPENDENCE, IN REMISSION (HCC): Status: ACTIVE | Noted: 2020-02-19

## 2021-02-08 PROCEDURE — 99385 PREV VISIT NEW AGE 18-39: CPT | Performed by: NURSE PRACTITIONER

## 2021-02-08 PROCEDURE — 3725F SCREEN DEPRESSION PERFORMED: CPT | Performed by: NURSE PRACTITIONER

## 2021-02-08 RX ORDER — CLONAZEPAM 0.5 MG/1
TABLET ORAL
COMMUNITY
Start: 2021-01-06

## 2021-02-08 RX ORDER — FLUTICASONE PROPIONATE 50 MCG
1 SPRAY, SUSPENSION (ML) NASAL DAILY
Qty: 1 BOTTLE | Refills: 0 | Status: SHIPPED | OUTPATIENT
Start: 2021-02-08 | End: 2021-02-22 | Stop reason: SDUPTHER

## 2021-02-08 NOTE — PROGRESS NOTES
ADULT ANNUAL [address]    NAME: Kassy Baker  AGE: 26 y o  SEX: female  : 1995     DATE: 2021     Assessment and Plan:     Problem List Items Addressed This Visit        Other    Generalized anxiety disorder with panic attacks     She is established with Psychiatry receiving counseling therapy and taking clonazepam p r n  As prescribed by her psychiatrist         Post-nasal drip     Flonase at   Take over the next few weeks and call office if symptoms persist          Relevant Medications    fluticasone (FLONASE) 50 mcg/act nasal spray      Other Visit Diagnoses     Encounter for well adult exam without abnormal findings    -  Primary          Immunizations and preventive care screenings were discussed with patient today  Appropriate education was printed on patient's after visit summary  Counseling:  Alcohol/drug use: discussed moderation in alcohol intake, the recommendations for healthy alcohol use, and avoidance of illicit drug use  Dental Health: discussed importance of regular tooth brushing, flossing, and dental visits  Injury prevention: discussed safety/seat belts, safety helmets, smoke detectors, carbon dioxide detectors, and smoking near bedding or upholstery  Sexual health: discussed sexually transmitted diseases, partner selection, use of condoms, avoidance of unintended pregnancy, and contraceptive alternatives  · Exercise: the importance of regular exercise/physical activity was discussed  Recommend exercise 3-5 times per week for at least 30 minutes  Return in about 6 months (around 2021)  Chief Complaint:     Chief Complaint   Patient presents with   [facility]     Physical Exam     lung irritation, started about a month ago       History of Present Illness:     Adult Annual Physical   Patient here for a comprehensive physical exam  The patient reports problems - lung problmes  She reports having mucous production  She denies any sob, no wheeze, no chest tightness, able to exercise for 20 minutes cardio without any issues  She reports when she lays down she has more problems, PND  Diet and Physical Activity  · Diet/Nutrition: well balanced diet  · Exercise: moderate cardiovascular exercise  Depression Screening  PHQ-9 Depression Screening    PHQ-9:   Frequency of the following problems over the past two weeks:      Little interest or pleasure in doing things: 1 - several days  Feeling down, depressed, or hopeless: 1 - several days  PHQ-2 Score: 2       General Health  · Sleep: sleeps well  · Hearing: normal - bilateral   · Vision: no vision problems  · Dental: regular dental visits  /GYN Health  · Last menstrual period: regular, light  · Contraceptive method: none  · History of STDs?: no      Review of Systems:     Review of Systems   Constitutional: Negative for activity change, chills, fatigue and fever  HENT: Positive for postnasal drip  Negative for congestion, ear discharge, ear pain, sinus pressure, sinus pain, sore throat, tinnitus and trouble swallowing  Eyes: Negative for photophobia, pain, discharge, itching and visual disturbance  Respiratory: Negative for cough, chest tightness, shortness of breath and wheezing  Cardiovascular: Negative for chest pain and leg swelling  Gastrointestinal: Negative for abdominal distention, abdominal pain, constipation, diarrhea, nausea and vomiting  Endocrine: Negative for polydipsia, polyphagia and polyuria  Genitourinary: Negative for dysuria and frequency  Musculoskeletal: Negative for arthralgias, myalgias, neck pain and neck stiffness  Skin: Negative for color change  Neurological: Negative for dizziness, syncope, weakness, numbness and headaches  Hematological: Does not bruise/bleed easily     Psychiatric/Behavioral: Negative for behavioral problems, confusion, self-injury, sleep disturbance and suicidal ideas  The patient is not nervous/anxious         Past Medical History:     Past Medical History:   Diagnosis Date    Known health problems: none     Mitral valve prolapse     Psychiatric disorder     anxiety, depression    Raynaud's disease       Past Surgical History:     Past Surgical History:   Procedure Laterality Date    VULVA SURGERY      WISDOM TOOTH EXTRACTION  06/19/2020      Social History:     E-Cigarette/Vaping    E-Cigarette Use Never User      E-Cigarette/Vaping Substances    Nicotine No     THC No     CBD No     Flavoring No      Social History     Socioeconomic History    Marital status: Single     Spouse name: None    Number of children: None    Years of education: None    Highest education level: None   Occupational History    None   Social Needs    Financial resource strain: None    Food insecurity     Worry: None     Inability: None    Transportation needs     Medical: None     Non-medical: None   Tobacco Use    Smoking status: Never Smoker    Smokeless tobacco: Never Used   Substance and Sexual Activity    Alcohol use: Not Currently     Comment: "socially"    Drug use: Not Currently     Comment: hasn't used in a few months    Sexual activity: Yes     Partners: Male   Lifestyle    Physical activity     Days per week: None     Minutes per session: None    Stress: None   Relationships    Social connections     Talks on phone: None     Gets together: None     Attends Yazdanism service: None     Active member of club or organization: None     Attends meetings of clubs or organizations: None     Relationship status: None    Intimate partner violence     Fear of current or ex partner: None     Emotionally abused: None     Physically abused: None     Forced sexual activity: None   Other Topics Concern    None   Social History Narrative    None      Family History:     Family History   Problem Relation Age of Onset    Arthritis Mother     Kidney disease Mother  Diabetes Father       Current Medications:     Current Outpatient Medications   Medication Sig Dispense Refill    clonazePAM (KlonoPIN) 0 5 mg tablet As needed      fluticasone (FLONASE) 50 mcg/act nasal spray 1 spray into each nostril daily 1 Bottle 0     No current facility-administered medications for this visit  Allergies: Allergies   Allergen Reactions    Cashew Nut Oil     Azithromycin Rash and Hives     Causative Agent: ZITHROMAX;     Medical Tape Rash     Long Term EKG leads, (allergic to sticky EKG leads as well as Foam electrodes) - skin peels and has red rash      Physical Exam:     /80 (BP Location: Left arm, Patient Position: Sitting)   Pulse 93   Temp 99 5 °F (37 5 °C)   Ht 5' 1" (1 549 m)   Wt 48 2 kg (106 lb 3 2 oz)   LMP 01/17/2021 (Exact Date)   SpO2 98%   BMI 20 07 kg/m²     Physical Exam  Constitutional:       Appearance: Normal appearance  She is well-developed  HENT:      Head: Normocephalic  Right Ear: Tympanic membrane, ear canal and external ear normal       Left Ear: Tympanic membrane, ear canal and external ear normal       Nose: Nose normal  No congestion or rhinorrhea  Mouth/Throat:      Mouth: Mucous membranes are moist       Pharynx: No oropharyngeal exudate or posterior oropharyngeal erythema  Comments: pnd  Eyes:      Extraocular Movements: Extraocular movements intact  Conjunctiva/sclera: Conjunctivae normal       Pupils: Pupils are equal, round, and reactive to light  Neck:      Musculoskeletal: Normal range of motion and neck supple  Cardiovascular:      Rate and Rhythm: Normal rate and regular rhythm  Pulses: Normal pulses  Heart sounds: Normal heart sounds  Pulmonary:      Effort: Pulmonary effort is normal       Breath sounds: Normal breath sounds  No wheezing or rhonchi  Abdominal:      General: Bowel sounds are normal  There is no distension  Palpations: Abdomen is soft  Tenderness:  There is no abdominal tenderness  Musculoskeletal: Normal range of motion  General: No swelling, tenderness, deformity or signs of injury  Right lower leg: No edema  Left lower leg: No edema  Skin:     General: Skin is warm and dry  Findings: No bruising, erythema, lesion or rash  Neurological:      General: No focal deficit present  Mental Status: She is alert and oriented to person, place, and time  Psychiatric:         Mood and Affect: Mood normal          Behavior: Behavior normal          Thought Content:  Thought content normal          Judgment: Judgment normal           Hien Lynch, [address]

## 2021-02-08 NOTE — ASSESSMENT & PLAN NOTE
She is established with Psychiatry receiving counseling therapy and taking clonazepam p r n   As prescribed by her psychiatrist

## 2021-02-22 DIAGNOSIS — R09.82 POST-NASAL DRIP: ICD-10-CM

## 2021-02-22 RX ORDER — FLUTICASONE PROPIONATE 50 MCG
SPRAY, SUSPENSION (ML) NASAL
Qty: 48 ML | Refills: 1 | Status: SHIPPED | OUTPATIENT
Start: 2021-02-22 | End: 2021-03-19 | Stop reason: ALTCHOICE

## 2021-03-03 ENCOUNTER — OFFICE VISIT (OUTPATIENT)
Dept: CARDIOLOGY CLINIC | Facility: CLINIC | Age: 26
End: 2021-03-03
Payer: COMMERCIAL

## 2021-03-03 VITALS
WEIGHT: 106.2 LBS | OXYGEN SATURATION: 98 % | HEIGHT: 61 IN | BODY MASS INDEX: 20.05 KG/M2 | DIASTOLIC BLOOD PRESSURE: 78 MMHG | HEART RATE: 84 BPM | SYSTOLIC BLOOD PRESSURE: 108 MMHG

## 2021-03-03 DIAGNOSIS — I34.1 MITRAL VALVE PROLAPSE: ICD-10-CM

## 2021-03-03 DIAGNOSIS — I47.2 NSVT (NONSUSTAINED VENTRICULAR TACHYCARDIA) (HCC): ICD-10-CM

## 2021-03-03 DIAGNOSIS — R00.2 PALPITATION: Primary | ICD-10-CM

## 2021-03-03 DIAGNOSIS — I34.0 MILD MITRAL REGURGITATION: ICD-10-CM

## 2021-03-03 DIAGNOSIS — F41.9 ANXIETY: ICD-10-CM

## 2021-03-03 PROCEDURE — 1036F TOBACCO NON-USER: CPT | Performed by: INTERNAL MEDICINE

## 2021-03-03 PROCEDURE — 99214 OFFICE O/P EST MOD 30 MIN: CPT | Performed by: INTERNAL MEDICINE

## 2021-03-03 NOTE — PROGRESS NOTES
Cardiology Consultation     Teena Part  [id number]  1995  [address] [place] [address]    1  Palpitation  Echo complete with contrast if indicated    AMB extended holter monitor   2  Mitral valve prolapse  Echo complete with contrast if indicated    AMB extended holter monitor   3  Mild mitral regurgitation     4  NSVT (nonsustained ventricular tachycardia) (HCC)  Echo complete with contrast if indicated    AMB extended holter monitor   5  Anxiety         Chief Complaint:   palpitation  [facility] [address] [place] [address]  Cardiology Follow Up    Teena Part  1995  [id number]      1  Palpitation  Echo complete with contrast if indicated    AMB extended holter monitor   2  Mitral valve prolapse  Echo complete with contrast if indicated    AMB extended holter monitor   3  Mild mitral regurgitation     4  NSVT (nonsustained ventricular tachycardia) (HCC)  Echo complete with contrast if indicated    AMB extended holter monitor   5  Anxiety         Chief Complaint   Patient presents with    Establish Care     MVP/Tachycardia/Panic Attacks       Interval History:    67-year-old female with history of mitral valve prolapse, anxiety, mild mitral regurgitation presented for follow-up and establish cardiology care  patient was seen by us in November 2019 when she was admitted at [facility] for palpitation and lightheadedness  She was primarily following with cardiologist in [place] with adult congenital cardiology  Patient was seen by them more than a year back  She would like to follow up with cardiologist near by and so she presented to our clinic today     patient had history of mitral valve prolapse since age of 4 with mild mitral regurgitation    She does have history of anxiety and panic disorders 2  Her panic disorder has significantly improved since she stop her oral contraceptives  She still gets intermittent palpitation  During her last follow-up with cardiologist in [place] in August 2020 patient was noted to have 1 episode of nonsustained ventricular tachycardia maximum of 3 beats  She was offered beta-blocker but patient   did not want to start  ( above information obtained after reviewing previous cardiologist's note from Care everywhere by me)     patient had an episode of palpitation lasting 2 weeks or early in January 2021 and she also had associated symptoms of chest pain so she went to emergency department at [facility]   No significant finding during ED visit except hypokalemia of 3 1 which was replaced orally     patient continues to feel occasional palpitation but much better since ED visit  She denies chest pain, shortness of breath, dizziness, orthopnea, leg edema or loss of consciousness     she does cardio exercise for 25 minutes 5 times a week without any palpitation lightheadedness chest pain or shortness of breath  She has noted more of a palpitation especially when she is anxious, excited or nervous  Never had syncope     denies personal history of TIA or any significant cardiac arrhythmia     Current medications reviewed  By me personally  Labs from January 2021 reviewed   Troponin negative    TSH within normal limit in 2019   Potassium 3 1    Review of Systems:   all review of system negative except as mentioned above    Patient Active Problem List   Diagnosis    Rapid heart beat    Neck pain    Generalized anxiety disorder with panic attacks    Mitral valve prolapse    Palpitations    Sedative, hypnotic or anxiolytic dependence, in remission ([address] )    Post-nasal drip     Past Medical History:   Diagnosis Date    Known health problems: none     Mitral valve prolapse     Psychiatric disorder     anxiety, depression    Raynaud's disease Social History     Socioeconomic History    Marital status: Single     Spouse name: Not on file    Number of children: Not on file    Years of education: Not on file    Highest education level: Not on file   Occupational History    Not on file   Social Needs    Financial resource strain: Not on file    Food insecurity     Worry: Not on file     Inability: Not on file    Transportation needs     Medical: Not on file     Non-medical: Not on file   Tobacco Use    Smoking status: Never Smoker    Smokeless tobacco: Never Used   Substance and Sexual Activity    Alcohol use: Not Currently     Comment: "socially"    Drug use: Not Currently     Comment: hasn't used in a few months    Sexual activity: Yes     Partners: Male   Lifestyle    Physical activity     Days per week: Not on file     Minutes per session: Not on file    Stress: Not on file   Relationships    Social connections     Talks on phone: Not on file     Gets together: Not on file     Attends Hindu service: Not on file     Active member of club or organization: Not on file     Attends meetings of clubs or organizations: Not on file     Relationship status: Not on file    Intimate partner violence     Fear of current or ex partner: Not on file     Emotionally abused: Not on file     Physically abused: Not on file     Forced sexual activity: Not on file   Other Topics Concern    Not on file   Social History Narrative    Not on file      Family History   Problem Relation Age of Onset    Arthritis Mother     Kidney disease Mother     Diabetes Father      Past Surgical History:   Procedure Laterality Date    VULVA SURGERY      WISDOM TOOTH EXTRACTION  06/19/2020       Current Outpatient Medications:     clonazePAM (KlonoPIN) 0 5 mg tablet, As needed, Disp: , Rfl:     fluticasone (FLONASE) 50 mcg/act nasal spray, SPRAY 1 SPRAY INTO EACH NOSTRIL EVERY DAY, Disp: 48 mL, Rfl: 1  Allergies   Allergen Reactions    Cashew Nut Oil     Azithromycin Rash and Hives     Causative Agent: Zehra Caldwella;     Medical Tape Rash     Long Term EKG leads, (allergic to sticky EKG leads as well as Foam electrodes) - skin peels and has red rash       Labs:  Office Visit on 01/19/2021   Component Date Value    SARS-COV-2 01/19/2021 Not Detected     INFLUENZA A PCR 01/19/2021 Not Detected     INFLUENZA B PCR 01/19/2021 Not Detected     RSV PCR 01/19/2021 Not Detected    Orders Only on 11/13/2020   Component Date Value    SARS-CoV-2  11/13/2020 Not Detected      Imaging: No results found  Physical Exam:  General:   Average built, awake, alert and oriented x3, not in distress  Neck: supple, no JVD  Eyes: PERRL, conjunctiva normal  Lungs:  Bilateral air entry positive, no wheeze/rhonchi or crackle  Heart:  S1-S2 normal, no murmur  Abdomen:  Soft ,nondistended ,nontender, bowel sounds positive  Extremities:  No leg edema, no deformity, ROM normal  Neuro:  Moving all extremities, speech clear  Skin: warm, no rash  /78 (BP Location: Left arm, Patient Position: Sitting, Cuff Size: Standard)   Pulse 84   Ht 5' 1" (1 549 m)   Wt 48 2 kg (106 lb 3 2 oz)   SpO2 98%   BMI 20 07 kg/m²     Cardiographics :  ECG:  EKG done on 01/10/2021 was personally reviewed by me which showed sinus tachycardia heart rate 102, nonspecific ST T wave changes    stress echo in November 2019    Exercise time 7 minutes 16 seconds achieved Mets 10 1, no chest pain during stress test, stress EKG negative for inducible ischemia, no stress induced changes on stress echo     echocardiogram in October 2019 reviewed from Care everywhere  LVEF 60%, normal RV size and systolic function, normal LV size, mild mitral anterior leaflet thickening with mild MR, bileaflet prolapse of mitral leaflet, trace MR     Holter monitor done in 07/2020( reviewed from Care everywhere) showed predominant sinus rhythm with 1 episode of ventricular tachycardia maximum of 3 beats   patient complained of multiple times skipped beats flutter chest pain shortness of breath during which noted to be in sinus rhythm      Assessment:    1  Palpitation   never had syncope  Exercise tolerance is unlimited  Appears more secondary to anxiety related    2  History of mitral valve prolapse/trace mitral regurgitation  3  Anxiety/panic disorder  4  History of nonsustained ventricular tachycardia maximum of 3 beats in May 2020    Recommendations:     at present time palpitation appears secondary to anxiety but patient does have chronic palpitation and history of nonsustained ventricular tachycardia and in view of above-mentioned symptoms early this year I would get 5 days of cardiac event monitoring to evaluate for any paroxysmal arrhythmia especially ventricular tachycardia     2D echocardiogram to evaluate for mitral valve prolapse and mitral regurgitation     patient advised to stay hydrated and encouraged to to continue current exercise     return to clinic in 6 months or early if needed   Above all discussed with patient    Patient understands and agrees

## 2021-03-10 DIAGNOSIS — Z23 ENCOUNTER FOR IMMUNIZATION: ICD-10-CM

## 2021-03-17 ENCOUNTER — CLINICAL SUPPORT (OUTPATIENT)
Dept: CARDIOLOGY CLINIC | Facility: CLINIC | Age: 26
End: 2021-03-17
Payer: COMMERCIAL

## 2021-03-17 DIAGNOSIS — R00.2 PALPITATION: ICD-10-CM

## 2021-03-17 DIAGNOSIS — I47.1 SVT (SUPRAVENTRICULAR TACHYCARDIA) (HCC): ICD-10-CM

## 2021-03-17 DIAGNOSIS — I47.2 NSVT (NONSUSTAINED VENTRICULAR TACHYCARDIA) (HCC): ICD-10-CM

## 2021-03-17 DIAGNOSIS — I34.1 MITRAL VALVE PROLAPSE: ICD-10-CM

## 2021-03-17 DIAGNOSIS — R00.2 PALPITATION: Primary | ICD-10-CM

## 2021-03-17 PROCEDURE — 93244 EXT ECG>48HR<7D REV&INTERPJ: CPT | Performed by: INTERNAL MEDICINE

## 2021-03-17 RX ORDER — METOPROLOL SUCCINATE 25 MG/1
25 TABLET, EXTENDED RELEASE ORAL DAILY
Qty: 90 TABLET | Refills: 3 | Status: SHIPPED | OUTPATIENT
Start: 2021-03-17

## 2021-03-18 ENCOUNTER — TELEPHONE (OUTPATIENT)
Dept: CARDIOLOGY CLINIC | Facility: CLINIC | Age: 26
End: 2021-03-18

## 2021-03-18 NOTE — TELEPHONE ENCOUNTER
Called patient and informed her that Dr Lexi Hays stated she should be taking metoprolol all the time and she can take vitamin supplements, including Klonopin  Patient understood

## 2021-03-18 NOTE — TELEPHONE ENCOUNTER
metoprolol succinate (TOPROL-XL) 25 mg 24 hr tablet   Patient would like to know does she take this medication as needed or all the time  Also should she avoid any supplements   She does take Magnesium and potassium and she occasionally  takes [employer] - [phone]

## 2021-03-19 ENCOUNTER — OFFICE VISIT (OUTPATIENT)
Dept: OBGYN CLINIC | Facility: CLINIC | Age: 26
End: 2021-03-19
Payer: COMMERCIAL

## 2021-03-19 ENCOUNTER — APPOINTMENT (OUTPATIENT)
Dept: LAB | Facility: HOSPITAL | Age: 26
End: 2021-03-19
Payer: COMMERCIAL

## 2021-03-19 VITALS — HEIGHT: 61 IN | BODY MASS INDEX: 19.6 KG/M2 | WEIGHT: 103.8 LBS

## 2021-03-19 DIAGNOSIS — Z01.419 ENCOUNTER FOR GYNECOLOGICAL EXAMINATION WITHOUT ABNORMAL FINDING: Primary | ICD-10-CM

## 2021-03-19 DIAGNOSIS — N63.10 LUMP OF RIGHT BREAST: ICD-10-CM

## 2021-03-19 DIAGNOSIS — Z86.19 HISTORY OF HERPES GENITALIS: ICD-10-CM

## 2021-03-19 DIAGNOSIS — Z11.3 SCREENING FOR STD (SEXUALLY TRANSMITTED DISEASE): ICD-10-CM

## 2021-03-19 PROCEDURE — 87491 CHLMYD TRACH DNA AMP PROBE: CPT | Performed by: NURSE PRACTITIONER

## 2021-03-19 PROCEDURE — 87389 HIV-1 AG W/HIV-1&-2 AB AG IA: CPT

## 2021-03-19 PROCEDURE — 36415 COLL VENOUS BLD VENIPUNCTURE: CPT

## 2021-03-19 PROCEDURE — 86592 SYPHILIS TEST NON-TREP QUAL: CPT

## 2021-03-19 PROCEDURE — 87340 HEPATITIS B SURFACE AG IA: CPT

## 2021-03-19 PROCEDURE — S0612 ANNUAL GYNECOLOGICAL EXAMINA: HCPCS | Performed by: NURSE PRACTITIONER

## 2021-03-19 PROCEDURE — 3008F BODY MASS INDEX DOCD: CPT | Performed by: INTERNAL MEDICINE

## 2021-03-19 PROCEDURE — 87591 N.GONORRHOEAE DNA AMP PROB: CPT | Performed by: NURSE PRACTITIONER

## 2021-03-19 PROCEDURE — 86803 HEPATITIS C AB TEST: CPT

## 2021-03-19 PROCEDURE — G0145 SCR C/V CYTO,THINLAYER,RESCR: HCPCS | Performed by: NURSE PRACTITIONER

## 2021-03-19 NOTE — PROGRESS NOTES
Diagnoses and all orders for this visit:    1  Encounter for gynecological examination without abnormal finding  -     Liquid-based pap, screening; Future  -     Liquid-based pap, screening  Pap collected today, discussed new guidelines  Safe sex practices and condom use encouraged  Healthy eating and nutrition, daily exercise discussed and SBE reinforced  Call with any issues and all questions and concerns addressed  2  Screening for STD (sexually transmitted disease)  -     HIV 1/2 Antigen/Antibody (4th Generation) w Reflex [facility]; Future  -     Hepatitis B surface antigen; Future  -     Hepatitis C antibody; Future  -     RPR; Future  -     Chlamydia/GC amplified DNA by PCR    3  Lump of right breast  -     US breast right limited (diagnostic); Future    4  HX of Herpes genitalis    Patient will call when she want to start her suppressive tx  Will order acyclovir this time for her  All questions and concerns answered  Patient to call with any questions  Pleasant 26 y o  nulliparous, premenopausal female here for new patient annual exam  She denies any issues with bleeding or her menses  Reports regular cycles  Denies history of abnormal pap smears  Last Papno pap today  Denies vaginal, urinary or breast issues, today  Denies pelvic pain  Denies any issues with her BCM, which is rhythm method  Sexually active, and pt requests STD testing including blood work  Patient about to start a "poly relationship"- second man  Current boyfriend and patient both have HSV with HX of frequent outbreaks, pt more than boyfriend  She would like to switch from valacyclovir to acyclovir  She c/o GI symptoms when taking TX  On episodic therapy but will like to switch to suppressive in a few weeks  Patient will call when she is ready and will start with acyclovir  Denies F/C/N/V  Had Gardasil vaccines        Health Maintenance:  Last PAP: Neg, 1-2 years ago, Hx of first abnormal  Next PAP Due: Collected today      Past Medical History:   Diagnosis Date    Known health problems: none     Mitral valve prolapse     Psychiatric disorder     anxiety, depression    Raynaud's disease      Past Surgical History:   Procedure Laterality Date    VULVA SURGERY      WISDOM TOOTH EXTRACTION  2020     Family History   Problem Relation Age of Onset    Arthritis Mother     Kidney disease Mother     Diabetes Father     Breast cancer Neg Hx     Ovarian cancer Neg Hx     Cervical cancer Neg Hx     Uterine cancer Neg Hx      Social History     Tobacco Use    Smoking status: Never Smoker    Smokeless tobacco: Never Used   Substance Use Topics    Alcohol use: Not Currently     Comment: "socially"    Drug use: Not Currently     Comment: hasn't used in a few months       Current Outpatient Medications:     clonazePAM (KlonoPIN) 0 5 mg tablet, As needed, Disp: , Rfl:     Magnesium 125 MG CAPS, Take 30 mg by mouth 2 (two) times a day, Disp: , Rfl:     metoprolol succinate (TOPROL-XL) 25 mg 24 hr tablet, Take 1 tablet (25 mg total) by mouth daily, Disp: 90 tablet, Rfl: 3  Patient Active Problem List    Diagnosis Date Noted    Post-nasal drip 2021    Generalized anxiety disorder with panic attacks 10/14/2020    Neck pain 09/15/2020    Sedative, hypnotic or anxiolytic dependence, in remission ([facility] ) 2020    Rapid heart beat 2019    Mitral valve prolapse 2019    Palpitations 2019       Allergies   Allergen Reactions    Cashew Nut Oil     Azithromycin Rash and Hives     Causative Agent: ZITHROMAX;     Medical Tape Rash     Long Term EKG leads, (allergic to sticky EKG leads as well as Foam electrodes) - skin peels and has red rash       OB History    Para Term  AB Living   0 0 0 0 0 0   SAB TAB Ectopic Multiple Live Births   0 0 0 0 0       Vitals:    21 1417   Weight: 47 1 kg (103 lb 12 8 oz)   Height: 5' 1" (1 549 m)     Body mass index is 19 61 kg/m²  Review of Systems   Constitutional: Negative for chills, fatigue, fever and unexpected weight change  Respiratory: Negative for shortness of breath  Gastrointestinal: Negative for anal bleeding, blood in stool, constipation and diarrhea  Genitourinary: Negative for difficulty urinating, dysuria and hematuria  Physical Exam  Chest:      Breasts:         Right: Inverted nipple, mass, nipple discharge and tenderness present  No swelling, bleeding or skin change  Left: No swelling, bleeding, inverted nipple, mass, nipple discharge, skin change or tenderness  Physical Exam   Constitutional: She appears well-developed and well-nourished  No distress  Head: Normocephalic  Neck: Normal range of motion  Neck supple  Pulmonary: Effort normal   Breasts: Left without masses, skin changes or nipple discharge  Bilaterally soft and warm to touch  No areas of erythema or pain  Abdominal: Soft  Non-tender  Pelvic exam was performed with patient supine  No labial fusion  There is no rash, tenderness, lesion or injury on the right labia  There is no rash, tenderness, lesion or injury on the left labia  Uterus is not deviated, not enlarged, not fixed and not tender  Cervix exhibits no motion tenderness, no discharge and ++ friability with pap  Right adnexum displays no mass, no tenderness and no fullness  Left adnexum displays no mass, no tenderness and no fullness  No erythema or tenderness in the vagina  No foreign body in the vagina  No signs of injury around the vagina  No vaginal discharge found  PAP collected w/o difficulty  Lymphadenopathy:        Right: No inguinal adenopathy present          Left: No inguinal adenopathy present

## 2021-03-20 LAB
HBV SURFACE AG SER QL: NORMAL
HCV AB SER QL: NORMAL
HIV 1+2 AB+HIV1 P24 AG SERPL QL IA: NORMAL

## 2021-03-22 LAB — RPR SER QL: NORMAL

## 2021-03-24 LAB
C TRACH DNA SPEC QL NAA+PROBE: NEGATIVE
N GONORRHOEA DNA SPEC QL NAA+PROBE: NEGATIVE

## 2021-03-29 ENCOUNTER — HOSPITAL ENCOUNTER (OUTPATIENT)
Dept: NON INVASIVE DIAGNOSTICS | Facility: CLINIC | Age: 26
Discharge: HOME | End: 2021-03-29
Payer: COMMERCIAL

## 2021-03-29 DIAGNOSIS — I47.2 NSVT (NONSUSTAINED VENTRICULAR TACHYCARDIA) (HCC): ICD-10-CM

## 2021-03-29 DIAGNOSIS — R00.2 PALPITATION: ICD-10-CM

## 2021-03-29 DIAGNOSIS — I34.1 MITRAL VALVE PROLAPSE: ICD-10-CM

## 2021-03-29 PROCEDURE — 93306 TTE W/DOPPLER COMPLETE: CPT | Performed by: INTERNAL MEDICINE

## 2021-03-29 PROCEDURE — 93306 TTE W/DOPPLER COMPLETE: CPT

## 2021-03-31 LAB
LAB AP GYN PRIMARY INTERPRETATION: NORMAL
Lab: NORMAL

## 2021-04-29 ENCOUNTER — TELEPHONE (OUTPATIENT)
Dept: OBGYN CLINIC | Facility: CLINIC | Age: 26
End: 2021-04-29

## 2021-04-29 DIAGNOSIS — Z86.19 HISTORY OF HERPES GENITALIS: Primary | ICD-10-CM

## 2021-04-29 NOTE — TELEPHONE ENCOUNTER
If she wants, its ok  It will let her know if she was exposed to cold sores technically, but that's all the blood test tells us

## 2021-04-29 NOTE — TELEPHONE ENCOUNTER
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

## 2021-08-23 ENCOUNTER — OFFICE VISIT (OUTPATIENT)
Dept: FAMILY MEDICINE CLINIC | Facility: CLINIC | Age: 26
End: 2021-08-23
Payer: COMMERCIAL

## 2021-08-23 VITALS
RESPIRATION RATE: 18 BRPM | HEART RATE: 109 BPM | BODY MASS INDEX: 20.5 KG/M2 | OXYGEN SATURATION: 98 % | HEIGHT: 60 IN | DIASTOLIC BLOOD PRESSURE: 60 MMHG | TEMPERATURE: 99.8 F | WEIGHT: 104.4 LBS | SYSTOLIC BLOOD PRESSURE: 102 MMHG

## 2021-08-23 DIAGNOSIS — F41.1 GENERALIZED ANXIETY DISORDER WITH PANIC ATTACKS: ICD-10-CM

## 2021-08-23 DIAGNOSIS — Q24.9 CONGENITAL HEART DISEASE: Primary | ICD-10-CM

## 2021-08-23 DIAGNOSIS — F41.0 GENERALIZED ANXIETY DISORDER WITH PANIC ATTACKS: ICD-10-CM

## 2021-08-23 DIAGNOSIS — R00.2 PALPITATIONS: ICD-10-CM

## 2021-08-23 PROCEDURE — 3725F SCREEN DEPRESSION PERFORMED: CPT | Performed by: NURSE PRACTITIONER

## 2021-08-23 PROCEDURE — 99214 OFFICE O/P EST MOD 30 MIN: CPT | Performed by: NURSE PRACTITIONER

## 2021-08-23 PROCEDURE — 3008F BODY MASS INDEX DOCD: CPT | Performed by: NURSE PRACTITIONER

## 2021-08-23 PROCEDURE — 1036F TOBACCO NON-USER: CPT | Performed by: NURSE PRACTITIONER

## 2021-08-23 NOTE — ASSESSMENT & PLAN NOTE
triggered by caffeine, has tried metoprolol but stopped due to SE, avoid caffeine beverages and keep hydrated

## 2021-08-23 NOTE — ASSESSMENT & PLAN NOTE
Yearly check up with cardiology, no changes in current treatment plan, recent echocardiogram  3-2021

## 2021-08-23 NOTE — PROGRESS NOTES
OFFICE VISIT  Taya Yanes 26 y o  female MRN: [id number]          Assessment / Plan:  Problem List Items Addressed This Visit        Cardiovascular and Mediastinum    Congenital heart disease - Primary     Yearly check up with cardiology, no changes in current treatment plan, recent echocardiogram  3-2021             Other    Generalized anxiety disorder with panic attacks     Prn use of clonazepam, prescribed by mental health          Palpitations     triggered by caffeine, has tried metoprolol but stopped due to SE, avoid caffeine beverages and keep hydrated  Reason For Visit / Chief Complaint  Chief Complaint   Patient presents with    Follow-up     6 months        HPI:  Taya Yanes is a 26 y o  female who presents today for follow up  She reports increase use of clonazepam over the last month, recent vacation  She reports her periods have been irregular and has contributed  to her anxiety  Seen cardiology this year, overall she reports doing well this year compared to last year       Historical Information   Past Medical History:   Diagnosis Date    Known health problems: none     Mitral valve prolapse     Nasal congestion     Psychiatric disorder     anxiety, depression    Raynaud's disease      Past Surgical History:   Procedure Laterality Date    VULVA SURGERY      WISDOM TOOTH EXTRACTION  06/19/2020     Social History   Social History     Substance and Sexual Activity   Alcohol Use Not Currently    Comment: "socially"     Social History     Substance and Sexual Activity   Drug Use Not Currently    Comment: hasn't used in a few months     Social History     Tobacco Use   Smoking Status Never Smoker   Smokeless Tobacco Never Used     Family History   Problem Relation Age of Onset    Arthritis Mother     Kidney disease Mother     Diabetes Father     Breast cancer Neg Hx     Ovarian cancer Neg Hx     Cervical cancer Neg Hx     Uterine cancer Neg Hx        Meds/Allergies Allergies   Allergen Reactions    Cashew Nut Oil - Food Allergy     Azithromycin Rash and Hives     Causative Agent: ZITHROMAX;     Medical Tape Rash     Long Term EKG leads, (allergic to sticky EKG leads as well as Foam electrodes) - skin peels and has red rash  Long Term EKG leads, (allergic to sticky EKG leads as well as Foam electrodes) - skin peels and has red rash    Other Rash     Long Term EKG leads, (allergic to sticky EKG leads as well as Foam electrodes) - skin peels and has red rash       Meds:    Current Outpatient Medications:     clonazePAM (KlonoPIN) 0 5 mg tablet, As needed, Disp: , Rfl:     Magnesium 125 MG CAPS, Take 30 mg by mouth 2 (two) times a day, Disp: , Rfl:     Magnesium Citrate 125 MG CAPS, Take 30 mg by mouth, Disp: , Rfl:     potassium chloride (Klor-Con) 20 mEq packet, MIX AND DRINK 1 PACKET BY MOUTH DAILY FOR 14 DAYS, Disp: , Rfl:     metoprolol succinate (TOPROL-XL) 25 mg 24 hr tablet, Take 1 tablet (25 mg total) by mouth daily (Patient not taking: Reported on 6/15/2021), Disp: 90 tablet, Rfl: 3      REVIEW OF SYSTEMS  Review of Systems   Constitutional: Negative for activity change, chills, fatigue and fever  HENT: Negative for congestion, ear discharge, ear pain, sinus pressure, sinus pain, sore throat, tinnitus and trouble swallowing  Eyes: Negative for photophobia, pain, discharge, itching and visual disturbance  Respiratory: Negative for cough, chest tightness, shortness of breath and wheezing  Cardiovascular: Positive for palpitations  Negative for chest pain and leg swelling  Gastrointestinal: Negative for abdominal distention, abdominal pain, constipation, diarrhea, nausea and vomiting  Endocrine: Negative for polydipsia, polyphagia and polyuria  Genitourinary: Negative for dysuria and frequency  Musculoskeletal: Negative for arthralgias, myalgias, neck pain and neck stiffness  Skin: Negative for color change     Neurological: Negative for dizziness, syncope, weakness, numbness and headaches  Hematological: Does not bruise/bleed easily  Psychiatric/Behavioral: Negative for behavioral problems, confusion, self-injury, sleep disturbance and suicidal ideas  The patient is nervous/anxious  Current Vitals:   Blood Pressure: 102/60 (08/23/21 1307)  Pulse: (!) 109 (08/23/21 1307)  Temperature: 99 8 °F (37 7 °C) (08/23/21 1307)  Respirations: 18 (08/23/21 1307)  Height: 5' (152 4 cm) (08/23/21 1307)  Weight - Scale: 47 4 kg (104 lb 6 4 oz) (08/23/21 1307)  SpO2: 98 % (08/23/21 1307)  [unfilled]    PHYSICAL EXAMS:  Physical Exam  Constitutional:       Appearance: Normal appearance  She is well-developed  HENT:      Head: Normocephalic  Right Ear: Tympanic membrane, ear canal and external ear normal       Left Ear: Tympanic membrane, ear canal and external ear normal       Nose: Nose normal  No congestion or rhinorrhea  Mouth/Throat:      Mouth: Mucous membranes are moist       Pharynx: No oropharyngeal exudate or posterior oropharyngeal erythema  Eyes:      Extraocular Movements: Extraocular movements intact  Conjunctiva/sclera: Conjunctivae normal       Pupils: Pupils are equal, round, and reactive to light  Cardiovascular:      Rate and Rhythm: Normal rate and regular rhythm  Pulses: Normal pulses  Heart sounds: Normal heart sounds  Pulmonary:      Effort: Pulmonary effort is normal       Breath sounds: Normal breath sounds  No wheezing or rhonchi  Abdominal:      General: Bowel sounds are normal  There is no distension  Palpations: Abdomen is soft  Tenderness: There is no abdominal tenderness  Musculoskeletal:         General: No swelling, tenderness, deformity or signs of injury  Normal range of motion  Cervical back: Normal range of motion and neck supple  Right lower leg: No edema  Left lower leg: No edema  Skin:     General: Skin is warm and dry        Findings: No bruising, erythema, lesion or rash  Neurological:      General: No focal deficit present  Mental Status: She is alert and oriented to person, place, and time  Psychiatric:         Mood and Affect: Mood normal          Behavior: Behavior normal          Thought Content: Thought content normal          Judgment: Judgment normal              Lab, imaging and other studies: I have personally reviewed pertinent reports  Laisha Espinoza

## 2023-02-19 ENCOUNTER — OFFICE VISIT (OUTPATIENT)
Dept: URGENT CARE | Facility: MEDICAL CENTER | Age: 28
End: 2023-02-19

## 2023-02-19 VITALS
BODY MASS INDEX: 21.71 KG/M2 | OXYGEN SATURATION: 99 % | SYSTOLIC BLOOD PRESSURE: 121 MMHG | TEMPERATURE: 97.9 F | DIASTOLIC BLOOD PRESSURE: 77 MMHG | RESPIRATION RATE: 18 BRPM | WEIGHT: 115 LBS | HEART RATE: 82 BPM | HEIGHT: 61 IN

## 2023-02-19 DIAGNOSIS — H66.002 NON-RECURRENT ACUTE SUPPURATIVE OTITIS MEDIA OF LEFT EAR WITHOUT SPONTANEOUS RUPTURE OF TYMPANIC MEMBRANE: Primary | ICD-10-CM

## 2023-02-19 RX ORDER — AMOXICILLIN 875 MG/1
875 TABLET, COATED ORAL 2 TIMES DAILY
Qty: 20 TABLET | Refills: 0 | Status: SHIPPED | OUTPATIENT
Start: 2023-02-19 | End: 2023-03-01

## 2023-02-19 RX ORDER — ALPRAZOLAM 0.5 MG/1
0.5 TABLET ORAL DAILY PRN
COMMUNITY
Start: 2023-02-17

## 2023-02-19 RX ORDER — CIPROFLOXACIN HYDROCHLORIDE 3.5 MG/ML
1 SOLUTION/ DROPS TOPICAL
COMMUNITY

## 2023-02-20 NOTE — PROGRESS NOTES
[facility]        NAME: Usman Mireles is a 33 y o  female  : 1995    MRN: [id number]  DATE: 2023  TIME: 7:22 PM    Assessment and Plan   Non-recurrent acute suppurative otitis media of left ear without spontaneous rupture of tympanic membrane [H66 002]  1  Non-recurrent acute suppurative otitis media of left ear without spontaneous rupture of tympanic membrane  amoxicillin (AMOXIL) 875 mg tablet            Patient Instructions   1  Over-the-counter ibuprofen and/or acetaminophen as needed for pain or fever  2  Oxymetazoline nasal spray 2 sprays in each nostril every 12 hours for no more than the next 5 days as needed for nasal congestion  3  Over-the-counter guaifenesin as needed for mucus relief  4  Gargle salt water as needed for sore throat relief  5  Increase oral fluid consumption  6  Follow-up with primary care provider in 7 days for any persistent symptoms  Chief Complaint     Chief Complaint   Patient presents with   • Earache     Pt having ear pain x5 days         History of Present Illness       60-year-old female patient with a 5-day history of worsening left-sided ear pain which began after being subsequently congested after influenza diagnosis approximately 1 week ago  Patient denies any bleeding or drainage from the ear  No decrease in hearing  She does have slight symptoms in the right side as well  She states that her congestion and so forth has greatly improved  No cough  Review of Systems   Review of Systems   Constitutional: Negative for chills and fever  HENT: Positive for ear pain  Negative for sore throat  Eyes: Negative for pain and visual disturbance  Respiratory: Negative for cough and shortness of breath  Cardiovascular: Negative for chest pain and palpitations  Gastrointestinal: Negative for abdominal pain and vomiting  Genitourinary: Negative for dysuria and hematuria     Musculoskeletal: Negative for arthralgias and back pain  Skin: Negative for color change and rash  Neurological: Negative for seizures and syncope  All other systems reviewed and are negative          Current Medications       Current Outpatient Medications:   •  ALPRAZolam (XANAX) 0 5 mg tablet, Take 0 5 mg by mouth daily as needed, Disp: , Rfl:   •  amoxicillin (AMOXIL) 875 mg tablet, Take 1 tablet (875 mg total) by mouth 2 (two) times a day for 10 days, Disp: 20 tablet, Rfl: 0  •  ciprofloxacin (CILOXAN) 0 3 % ophthalmic solution, 1 drop every 2 (two) hours, Disp: , Rfl:   •  clonazePAM (KlonoPIN) 0 5 mg tablet, As needed (Patient not taking: Reported on 2/19/2023), Disp: , Rfl:   •  Magnesium 125 MG CAPS, Take 30 mg by mouth 2 (two) times a day (Patient not taking: Reported on 2/19/2023), Disp: , Rfl:   •  Magnesium Citrate 125 MG CAPS, Take 30 mg by mouth (Patient not taking: Reported on 2/19/2023), Disp: , Rfl:   •  metoprolol succinate (TOPROL-XL) 25 mg 24 hr tablet, Take 1 tablet (25 mg total) by mouth daily (Patient not taking: Reported on 6/15/2021), Disp: 90 tablet, Rfl: 3  •  potassium chloride (KLOR-CON) 20 mEq packet, MIX AND DRINK 1 PACKET BY MOUTH DAILY FOR 14 DAYS (Patient not taking: Reported on 2/19/2023), Disp: , Rfl:     Current Allergies     Allergies as of 02/19/2023 - Reviewed 02/19/2023   Allergen Reaction Noted   • Cashew nut oil - food allergy  05/19/2018   • Azithromycin Rash and Hives 05/24/2018   • Medical tape Rash 10/28/2019   • Other Rash 10/28/2019            The following portions of the patient's history were reviewed and updated as appropriate: allergies, current medications, past family history, past medical history, past social history, past surgical history and problem list      Past Medical History:   Diagnosis Date   • Known health problems: none    • Mitral valve prolapse    • Nasal congestion    • Psychiatric disorder     anxiety, depression   • Raynaud's disease        Past Surgical History:   Procedure Laterality Date   • VULVA SURGERY     • WISDOM TOOTH EXTRACTION  06/19/2020       Family History   Problem Relation Age of Onset   • Arthritis Mother    • Kidney disease Mother    • Diabetes Father    • Breast cancer Neg Hx    • Ovarian cancer Neg Hx    • Cervical cancer Neg Hx    • Uterine cancer Neg Hx          Medications have been verified  Objective   /77 (BP Location: Right arm)   Pulse 82   Temp 97 9 °F (36 6 °C) (Temporal)   Resp 18   Ht 5' 1" (1 549 m)   Wt 52 2 kg (115 lb)   SpO2 99%   BMI 21 73 kg/m²        Physical Exam     Physical Exam  Constitutional:       Appearance: Normal appearance  HENT:      Head: Normocephalic  Right Ear: Tympanic membrane normal       Ears:      Comments: Left TM is bulging, injected, trace subtle purulent middle ear effusion  No perforation  Nose: Nose normal       Mouth/Throat:      Mouth: Mucous membranes are moist       Pharynx: Oropharynx is clear  Eyes:      Conjunctiva/sclera: Conjunctivae normal       Pupils: Pupils are equal, round, and reactive to light  Cardiovascular:      Rate and Rhythm: Normal rate and regular rhythm  Pulses: Normal pulses  Pulmonary:      Effort: Pulmonary effort is normal    Abdominal:      Tenderness: There is no abdominal tenderness  Musculoskeletal:         General: Normal range of motion  Cervical back: Normal range of motion  Skin:     General: Skin is warm and dry  Capillary Refill: Capillary refill takes less than 2 seconds  Neurological:      General: No focal deficit present  Mental Status: She is alert and oriented to person, place, and time     Psychiatric:         Mood and Affect: Mood normal          Behavior: Behavior normal

## 2024-10-18 ENCOUNTER — APPOINTMENT (EMERGENCY)
Dept: CT IMAGING | Facility: HOSPITAL | Age: 29
End: 2024-10-18
Payer: COMMERCIAL

## 2024-10-18 ENCOUNTER — HOSPITAL ENCOUNTER (EMERGENCY)
Facility: HOSPITAL | Age: 29
Discharge: HOME | End: 2024-10-18
Payer: COMMERCIAL

## 2024-10-18 VITALS
OXYGEN SATURATION: 100 % | TEMPERATURE: 98.3 F | BODY MASS INDEX: 22.27 KG/M2 | RESPIRATION RATE: 18 BRPM | SYSTOLIC BLOOD PRESSURE: 108 MMHG | HEART RATE: 74 BPM | WEIGHT: 117.95 LBS | DIASTOLIC BLOOD PRESSURE: 64 MMHG | HEIGHT: 61 IN

## 2024-10-18 DIAGNOSIS — N39.0 UTI (URINARY TRACT INFECTION): Primary | ICD-10-CM

## 2024-10-18 DIAGNOSIS — N12 PYELONEPHRITIS: ICD-10-CM

## 2024-10-18 LAB
ALBUMIN SERPL BCG-MCNC: 4.5 G/DL (ref 3.5–5)
ALP SERPL-CCNC: 44 U/L (ref 34–104)
ALT SERPL W P-5'-P-CCNC: 16 U/L (ref 7–52)
ANION GAP SERPL CALCULATED.3IONS-SCNC: 8 MMOL/L (ref 4–13)
AST SERPL W P-5'-P-CCNC: 19 U/L (ref 13–39)
BASOPHILS # BLD AUTO: 0.01 THOUSANDS/ΜL (ref 0–0.1)
BASOPHILS NFR BLD AUTO: 0 % (ref 0–1)
BILIRUB SERPL-MCNC: 0.44 MG/DL (ref 0.2–1)
BILIRUB UR QL STRIP: NEGATIVE
BUN SERPL-MCNC: 11 MG/DL (ref 5–25)
CALCIUM SERPL-MCNC: 9.3 MG/DL (ref 8.4–10.2)
CHLORIDE SERPL-SCNC: 100 MMOL/L (ref 96–108)
CLARITY UR: CLEAR
CO2 SERPL-SCNC: 28 MMOL/L (ref 21–32)
COLOR UR: COLORLESS
CREAT SERPL-MCNC: 0.63 MG/DL (ref 0.6–1.3)
EOSINOPHIL # BLD AUTO: 0.04 THOUSAND/ΜL (ref 0–0.61)
EOSINOPHIL NFR BLD AUTO: 1 % (ref 0–6)
ERYTHROCYTE [DISTWIDTH] IN BLOOD BY AUTOMATED COUNT: 12.5 % (ref 11.6–15.1)
EXT PREGNANCY TEST URINE: NEGATIVE
EXT. CONTROL: NORMAL
GFR SERPL CREATININE-BSD FRML MDRD: 122 ML/MIN/1.73SQ M
GLUCOSE SERPL-MCNC: 95 MG/DL (ref 65–140)
GLUCOSE UR STRIP-MCNC: NEGATIVE MG/DL
HCT VFR BLD AUTO: 43.1 % (ref 34.8–46.1)
HGB BLD-MCNC: 14.2 G/DL (ref 11.5–15.4)
HGB UR QL STRIP.AUTO: NEGATIVE
IMM GRANULOCYTES # BLD AUTO: 0.01 THOUSAND/UL (ref 0–0.2)
IMM GRANULOCYTES NFR BLD AUTO: 0 % (ref 0–2)
KETONES UR STRIP-MCNC: NEGATIVE MG/DL
LEUKOCYTE ESTERASE UR QL STRIP: NEGATIVE
LIPASE SERPL-CCNC: 24 U/L (ref 11–82)
LYMPHOCYTES # BLD AUTO: 1.95 THOUSANDS/ΜL (ref 0.6–4.47)
LYMPHOCYTES NFR BLD AUTO: 33 % (ref 14–44)
MCH RBC QN AUTO: 32 PG (ref 26.8–34.3)
MCHC RBC AUTO-ENTMCNC: 32.9 G/DL (ref 31.4–37.4)
MCV RBC AUTO: 97 FL (ref 82–98)
MONOCYTES # BLD AUTO: 0.43 THOUSAND/ΜL (ref 0.17–1.22)
MONOCYTES NFR BLD AUTO: 7 % (ref 4–12)
NEUTROPHILS # BLD AUTO: 3.44 THOUSANDS/ΜL (ref 1.85–7.62)
NEUTS SEG NFR BLD AUTO: 59 % (ref 43–75)
NITRITE UR QL STRIP: NEGATIVE
NRBC BLD AUTO-RTO: 0 /100 WBCS
PH UR STRIP.AUTO: 7 [PH]
PLATELET # BLD AUTO: 246 THOUSANDS/UL (ref 149–390)
PMV BLD AUTO: 9.9 FL (ref 8.9–12.7)
POTASSIUM SERPL-SCNC: 3.7 MMOL/L (ref 3.5–5.3)
PROT SERPL-MCNC: 7.6 G/DL (ref 6.4–8.4)
PROT UR STRIP-MCNC: NEGATIVE MG/DL
RBC # BLD AUTO: 4.44 MILLION/UL (ref 3.81–5.12)
SODIUM SERPL-SCNC: 136 MMOL/L (ref 135–147)
SP GR UR STRIP.AUTO: 1 (ref 1–1.03)
UROBILINOGEN UR STRIP-ACNC: <2 MG/DL
WBC # BLD AUTO: 5.88 THOUSAND/UL (ref 4.31–10.16)

## 2024-10-18 PROCEDURE — 96365 THER/PROPH/DIAG IV INF INIT: CPT

## 2024-10-18 PROCEDURE — 83690 ASSAY OF LIPASE: CPT

## 2024-10-18 PROCEDURE — 81025 URINE PREGNANCY TEST: CPT

## 2024-10-18 PROCEDURE — 99284 EMERGENCY DEPT VISIT MOD MDM: CPT

## 2024-10-18 PROCEDURE — 81003 URINALYSIS AUTO W/O SCOPE: CPT

## 2024-10-18 PROCEDURE — 96375 TX/PRO/DX INJ NEW DRUG ADDON: CPT

## 2024-10-18 PROCEDURE — 36415 COLL VENOUS BLD VENIPUNCTURE: CPT

## 2024-10-18 PROCEDURE — 85025 COMPLETE CBC W/AUTO DIFF WBC: CPT

## 2024-10-18 PROCEDURE — 80053 COMPREHEN METABOLIC PANEL: CPT

## 2024-10-18 RX ORDER — CEFPODOXIME PROXETIL 200 MG/1
200 TABLET, FILM COATED ORAL 2 TIMES DAILY
Qty: 28 TABLET | Refills: 0 | Status: SHIPPED | OUTPATIENT
Start: 2024-10-18 | End: 2024-11-01

## 2024-10-18 RX ORDER — FLUCONAZOLE 150 MG/1
150 TABLET ORAL ONCE
Qty: 1 TABLET | Refills: 0 | Status: SHIPPED | OUTPATIENT
Start: 2024-10-18 | End: 2024-10-18

## 2024-10-18 RX ORDER — CEFPODOXIME PROXETIL 200 MG/1
200 TABLET, FILM COATED ORAL 2 TIMES DAILY
Qty: 20 TABLET | Refills: 0 | Status: SHIPPED | OUTPATIENT
Start: 2024-10-18 | End: 2024-10-18

## 2024-10-18 RX ORDER — KETOROLAC TROMETHAMINE 30 MG/ML
15 INJECTION, SOLUTION INTRAMUSCULAR; INTRAVENOUS ONCE
Status: COMPLETED | OUTPATIENT
Start: 2024-10-18 | End: 2024-10-18

## 2024-10-18 RX ADMIN — SODIUM CHLORIDE 1000 ML: 0.9 INJECTION, SOLUTION INTRAVENOUS at 20:28

## 2024-10-18 RX ADMIN — KETOROLAC TROMETHAMINE 15 MG: 30 INJECTION, SOLUTION INTRAMUSCULAR at 20:28

## 2024-10-18 RX ADMIN — CEFTRIAXONE SODIUM 1000 MG: 10 INJECTION, POWDER, FOR SOLUTION INTRAVENOUS at 20:28

## 2024-10-19 NOTE — DISCHARGE INSTRUCTIONS
You are seen today in the emergency department for evaluation of burning with urination and back pain.  Your blood work looks very reassuring.  Your urine is without signs of UTI however this is likely because you have been on Macrobid for 2 days.  Begin taking your cefpodoxime twice a day x 10 days.  Finish the entire course of the antibiotic.  Return immediately to the ER for any new or worsening symptoms

## 2024-10-19 NOTE — ED PROVIDER NOTES
ED Disposition       None          Assessment & Plan   {Hyperlinks  Risk Stratification - NIHSS - HEART SCORE - Fill out sepsis note and make sure you call [id number] if severe or septic shock:[id number]}    Medical Decision Making  29y F p/w back pain after dysuria    DDx including but not limited to:    Plan: toradol, IVF, imaging, urine studies    Amount and/or Complexity of Data Reviewed  Labs: ordered. Decision-making details documented in ED Course.  Radiology: ordered.    Risk  Prescription drug management.      ED Course as of 10/18/24 2018   Fri Oct 18, 2024   1936 POCT pregnancy, urine   2014 UA w Reflex to Microscopic w Reflex to Culture  Not c/w UTI however has been on nitrofurantoin x 2d       Medications   ceftriaxone (ROCEPHIN) 1 g/50 mL in dextrose IVPB (has no administration in time range)   ketorolac (TORADOL) injection 15 mg (has no administration in time range)   sodium chloride 0.9 % bolus 1,000 mL (has no administration in time range)       ED Risk Strat Scores                           SBIRT 22yo+      Flowsheet Row Most Recent Value   Initial Alcohol Screen: US AUDIT-C     1. How often do you have a drink containing alcohol? 0 Filed at: 10/18/2024 1918   2. How many drinks containing alcohol do you have on a typical day you are drinking?  0 Filed at: 10/18/2024 1918   3b. FEMALE Any Age, or MALE 65+: How often do you have 4 or more drinks on one occassion? 0 Filed at: 10/18/2024 1918   Audit-C Score 0 Filed at: 10/18/2024 1918   ILSA: How many times in the past year have you...    Used an illegal drug or used a prescription medication for non-medical reasons? Never Filed at: 10/18/2024 1918                            History of Present Illness   {Hyperlinks  History (Med, Surg, Fam, Social) - Current Medications - Allergies  :[id number]}    Chief Complaint   Patient presents with   • Possible UTI     States she was diagnosed with uti, and is now c/o b/l flank pain       Past Medical History:  "  Diagnosis Date   • Known health problems: none    • Mitral valve prolapse    • Nasal congestion    • Psychiatric disorder     anxiety, depression   • Raynaud's disease       Past Surgical History:   Procedure Laterality Date   • VULVA SURGERY     • WISDOM TOOTH EXTRACTION  06/19/2020      Family History   Problem Relation Age of Onset   • Arthritis Mother    • Kidney disease Mother    • Diabetes Father    • Breast cancer Neg Hx    • Ovarian cancer Neg Hx    • Cervical cancer Neg Hx    • Uterine cancer Neg Hx       Social History     Tobacco Use   • Smoking status: Never   • Smokeless tobacco: Never   Vaping Use   • Vaping status: Never Used   Substance Use Topics   • Alcohol use: Not Currently     Comment: \"socially\"   • Drug use: Not Currently     Comment: hasn't used in a few months      E-Cigarette/Vaping   • E-Cigarette Use Never User       E-Cigarette/Vaping Substances   • Nicotine No    • THC No    • CBD No    • Flavoring No       I have reviewed and agree with the history as documented.     Patient is a 29y F w/ pmhx multiple UTI presenting to the ED for evaluation of back pain.  Patient notes \"my UTIs started Tuesday \".  She endorses burning, suprapubic pressure and a small amount of back pain.  She was seen by an urgent care like facility which started her on Macrobid twice daily 100 mg.  She began taking Azo last night which she states is helping with her dysuria.  Beginning last night patient noted significant flank pain around 10 PM.  She denies fever but does endorse intermittent nausea.  She reports left-sided greater than right sided flank pain that is sharp in nature.  She reports she may have been diagnosed with interstitial cystitis in the past but she is unsure.  She otherwise feels well and without complaint.  She is eating okay.  Drinking okay.      Review of Systems   Constitutional:  Negative for chills and fever.   HENT:  Negative for ear pain and sore throat.    Eyes:  Negative for pain " and visual disturbance.   Respiratory:  Negative for cough and shortness of breath.    Cardiovascular:  Negative for chest pain and palpitations.   Gastrointestinal:  Negative for abdominal pain and vomiting.   Genitourinary:  Negative for dysuria and hematuria.   Musculoskeletal:  Positive for back pain. Negative for arthralgias.   Skin:  Negative for color change and rash.   Neurological:  Negative for seizures and syncope.   All other systems reviewed and are negative.          Objective   {Hyperlinks  Historical Vitals - Historical Labs - Chart Review/Microbiology - Last Echo - Code Status  :[id number]}    ED Triage Vitals [10/18/24 1900]   Temperature Pulse Blood Pressure Respirations SpO2 Patient Position - Orthostatic VS   98.4 °F (36.9 °C) 78 119/80 15 100 % Sitting      Temp src Heart Rate Source BP Location FiO2 (%) Pain Score    -- Monitor Left arm -- --      Vitals      Date and Time Temp Pulse SpO2 Resp BP Pain Score FACES Pain Rating User   10/18/24 1900 98.4 °F (36.9 °C) 78 100 % 15 119/80 -- -- AC            Physical Exam  Vitals and nursing note reviewed.   Constitutional:       General: She is not in acute distress.     Appearance: Normal appearance. She is not ill-appearing, toxic-appearing or diaphoretic.   HENT:      Head: Normocephalic and atraumatic.   Eyes:      General: No scleral icterus.        Right eye: No discharge.         Left eye: No discharge.      Extraocular Movements: Extraocular movements intact.      Conjunctiva/sclera: Conjunctivae normal.   Cardiovascular:      Rate and Rhythm: Normal rate.      Pulses: Normal pulses.      Heart sounds: Normal heart sounds. No murmur heard.     No friction rub. No gallop.   Pulmonary:      Effort: Pulmonary effort is normal. No respiratory distress.      Breath sounds: Normal breath sounds. No stridor. No wheezing, rhonchi or rales.   Abdominal:      General: Abdomen is flat. Bowel sounds are normal. There is no distension.      Palpations:  Abdomen is soft.      Tenderness: There is no abdominal tenderness. There is right CVA tenderness and left CVA tenderness. There is no guarding or rebound.      Comments: Mild suprapubic tenderness to palpation. No rigidity, guarding, rebound. Mild BL flank pain to palpation   Musculoskeletal:         General: No swelling. Normal range of motion.      Cervical back: Normal range of motion. No rigidity.      Right lower leg: No edema.      Left lower leg: No edema.   Skin:     General: Skin is warm and dry.      Capillary Refill: Capillary refill takes less than 2 seconds.      Coloration: Skin is not jaundiced.      Findings: No bruising or lesion.   Neurological:      General: No focal deficit present.      Mental Status: She is alert and oriented to person, place, and time. Mental status is at baseline.   Psychiatric:         Mood and Affect: Mood normal.         Behavior: Behavior normal.         Thought Content: Thought content normal.         Judgment: Judgment normal.       Results Reviewed       Procedure Component Value Units Date/Time    CBC and differential [[id number]]     Lab Status: No result Specimen: Blood     Comprehensive metabolic panel [[id number]]     Lab Status: No result Specimen: Blood     Lipase [[id number]]     Lab Status: No result Specimen: Blood     UA w Reflex to Microscopic w Reflex to Culture [[id number]] Collected: 10/18/24 1930    Lab Status: Final result Specimen: Urine, Clean Catch Updated: 10/18/24 1938     Color, UA Colorless     Clarity, UA Clear     Specific Gravity, UA 1.003     pH, UA 7.0     Leukocytes, UA Negative     Nitrite, UA Negative     Protein, UA Negative mg/dl      Glucose, UA Negative mg/dl      Ketones, UA Negative mg/dl      Urobilinogen, UA <2.0 mg/dl      Bilirubin, UA Negative     Occult Blood, UA Negative    POCT pregnancy, urine [[id number]]  (Normal) Resulted: 10/18/24 1934    Lab Status: Final result Updated: 10/18/24 1934     EXT Preg Test, Ur Negative      Control Valid            CT abdomen pelvis with contrast    (Results Pending)       Procedures    ED Medication and Procedure Management   Prior to Admission Medications   Prescriptions Last Dose Informant Patient Reported? Taking?   ALPRAZolam (XANAX) 0.5 mg tablet   Yes No   Sig: Take 0.5 mg by mouth daily as needed   Magnesium 125 MG CAPS  Self Yes No   Sig: Take 30 mg by mouth 2 (two) times a day   Patient not taking: Reported on 2023   Magnesium Citrate 125 MG CAPS   Yes No   Sig: Take 30 mg by mouth   Patient not taking: Reported on 2023   ciprofloxacin (CILOXAN) 0.3 % ophthalmic solution   Yes No   Si drop every 2 (two) hours   clonazePAM (KlonoPIN) 0.5 mg tablet  Self Yes No   Sig: As needed   Patient not taking: Reported on 2023   metoprolol succinate (TOPROL-XL) 25 mg 24 hr tablet  Self No No   Sig: Take 1 tablet (25 mg total) by mouth daily   Patient not taking: Reported on 6/15/2021   potassium chloride (KLOR-CON) 20 mEq packet   Yes No   Sig: MIX AND DRINK 1 PACKET BY MOUTH DAILY FOR 14 DAYS   Patient not taking: Reported on 2023      Facility-Administered Medications: None     Patient's Medications   Discharge Prescriptions    No medications on file     No discharge procedures on file.  ED SEPSIS DOCUMENTATION          Lab Status: Final result Specimen: Blood from Arm, Right Updated: 10/18/24 2032     WBC 5.88 Thousand/uL      RBC 4.44 Million/uL      Hemoglobin 14.2 g/dL      Hematocrit 43.1 %      MCV 97 fL      MCH 32.0 pg      MCHC 32.9 g/dL      RDW 12.5 %      MPV 9.9 fL      Platelets 246 Thousands/uL      nRBC 0 /100 WBCs      Segmented % 59 %      Immature Grans % 0 %      Lymphocytes % 33 %      Monocytes % 7 %      Eosinophils Relative 1 %      Basophils Relative 0 %      Absolute Neutrophils 3.44 Thousands/µL      Absolute Immature Grans 0.01 Thousand/uL      Absolute Lymphocytes 1.95 Thousands/µL      Absolute Monocytes 0.43 Thousand/µL      Eosinophils Absolute 0.04 Thousand/µL      Basophils Absolute 0.01 Thousands/µL     UA w Reflex to Microscopic w Reflex to Culture [[id number]] Collected: 10/18/24 1930    Lab Status: Final result Specimen: Urine, Clean Catch Updated: 10/18/24 1938     Color, UA Colorless     Clarity, UA Clear     Specific Gravity, UA 1.003     pH, UA 7.0     Leukocytes, UA Negative     Nitrite, UA Negative     Protein, UA Negative mg/dl      Glucose, UA Negative mg/dl      Ketones, UA Negative mg/dl      Urobilinogen, UA <2.0 mg/dl      Bilirubin, UA Negative     Occult Blood, UA Negative    POCT pregnancy, urine [[id number]]  (Normal) Resulted: 10/18/24 1934    Lab Status: Final result Updated: 10/18/24 1934     EXT Preg Test, Ur Negative     Control Valid            No orders to display       Procedures    ED Medication and Procedure Management   Prior to Admission Medications   Prescriptions Last Dose Informant Patient Reported? Taking?   ALPRAZolam (XANAX) 0.5 mg tablet   Yes No   Sig: Take 0.5 mg by mouth daily as needed   Magnesium 125 MG CAPS  Self Yes No   Sig: Take 30 mg by mouth 2 (two) times a day   Patient not taking: Reported on 2/19/2023   Magnesium Citrate 125 MG CAPS   Yes No   Sig: Take 30 mg by mouth   Patient not taking: Reported on 2/19/2023   ciprofloxacin (CILOXAN) 0.3 %  ophthalmic solution   Yes No   Si drop every 2 (two) hours   clonazePAM (KlonoPIN) 0.5 mg tablet  Self Yes No   Sig: As needed   Patient not taking: Reported on 2023   metoprolol succinate (TOPROL-XL) 25 mg 24 hr tablet  Self No No   Sig: Take 1 tablet (25 mg total) by mouth daily   Patient not taking: Reported on 6/15/2021   potassium chloride (KLOR-CON) 20 mEq packet   Yes No   Sig: MIX AND DRINK 1 PACKET BY MOUTH DAILY FOR 14 DAYS   Patient not taking: Reported on 2023      Facility-Administered Medications: None     Discharge Medication List as of 10/18/2024 10:14 PM        START taking these medications    Details   fluconazole (DIFLUCAN) 150 mg tablet Take 1 tablet (150 mg total) by mouth once for 1 dose Take as needed for vaginal itching during antibiotic course, Starting Fri 10/18/2024, Normal           CONTINUE these medications which have CHANGED    Details   cefpodoxime (VANTIN) 200 mg tablet Take 1 tablet (200 mg total) by mouth 2 (two) times a day for 14 days, Starting Fri 10/18/2024, Until 2024, Normal           CONTINUE these medications which have NOT CHANGED    Details   ALPRAZolam (XANAX) 0.5 mg tablet Take 0.5 mg by mouth daily as needed, Starting 2023, Historical Med      ciprofloxacin (CILOXAN) 0.3 % ophthalmic solution 1 drop every 2 (two) hours, Historical Med      clonazePAM (KlonoPIN) 0.5 mg tablet As needed, Starting 2021, Historical Med      Magnesium 125 MG CAPS Take 30 mg by mouth 2 (two) times a day, Historical Med      Magnesium Citrate 125 MG CAPS Take 30 mg by mouth, Historical Med      metoprolol succinate (TOPROL-XL) 25 mg 24 hr tablet Take 1 tablet (25 mg total) by mouth daily, Starting Wed 3/17/2021, Normal      potassium chloride (KLOR-CON) 20 mEq packet MIX AND DRINK 1 PACKET BY MOUTH DAILY FOR 14 DAYS, Historical Med           No discharge procedures on file.  ED SEPSIS DOCUMENTATION   Time reflects when diagnosis was documented in both  MDM as applicable and the Disposition within this note       Time User Action Codes Description Comment    10/18/2024 10:02 PM Tashi Peña Add [M54.9] Back pain     10/18/2024 10:02 PM Tashi Peña Add [R30.0] Dysuria     10/18/2024 10:02 PM Tashi Peña Modify [R30.0] Dysuria     10/18/2024 10:02 PM Tashi Peña Remove [M54.9] Back pain     10/18/2024 10:02 PM Tashi Peña Remove [R30.0] Dysuria     10/18/2024 10:03 PM Tashi Peña Add [R30.0] Dysuria     10/18/2024 10:03 PM Tashi Peña Add [M54.9] Back pain     10/18/2024 10:06 PM Tashi Peña Modify [M54.9] Back pain     10/18/2024 10:06 PM Tashi Peña Remove [R30.0] Dysuria     10/18/2024 10:06 PM Tashi Peña Remove [M54.9] Back pain     10/18/2024 10:06 PM Tashi Peña Add [N39.0] UTI (urinary tract infection)     10/18/2024 10:06 PM Tashi Peña Add [N12] Pyelonephritis                  Tashi Peña DO  10/21/24 0943

## 2024-10-19 NOTE — ED NOTES
Sarahi from ct scan arrived to take pt for testing, pt states she no longer wants the scan. Dr Peña notified     Radha Martinez, JAIME  10/18/24 2627